# Patient Record
Sex: MALE | Race: WHITE | Employment: OTHER | URBAN - METROPOLITAN AREA
[De-identification: names, ages, dates, MRNs, and addresses within clinical notes are randomized per-mention and may not be internally consistent; named-entity substitution may affect disease eponyms.]

---

## 2017-02-06 ENCOUNTER — OFFICE VISIT (OUTPATIENT)
Dept: INTERNAL MEDICINE CLINIC | Age: 62
End: 2017-02-06

## 2017-02-06 VITALS
HEART RATE: 55 BPM | RESPIRATION RATE: 18 BRPM | DIASTOLIC BLOOD PRESSURE: 80 MMHG | HEIGHT: 72 IN | BODY MASS INDEX: 31.56 KG/M2 | SYSTOLIC BLOOD PRESSURE: 110 MMHG | WEIGHT: 233 LBS

## 2017-02-06 DIAGNOSIS — I10 ESSENTIAL HYPERTENSION: ICD-10-CM

## 2017-02-06 DIAGNOSIS — E11.9 DIET-CONTROLLED DIABETES MELLITUS (HCC): Primary | ICD-10-CM

## 2017-02-06 DIAGNOSIS — E78.49 OTHER HYPERLIPIDEMIA: ICD-10-CM

## 2017-02-06 DIAGNOSIS — K21.9 GASTROESOPHAGEAL REFLUX DISEASE WITHOUT ESOPHAGITIS: ICD-10-CM

## 2017-02-06 PROCEDURE — 99213 OFFICE O/P EST LOW 20 MIN: CPT | Performed by: INTERNAL MEDICINE

## 2017-02-06 ASSESSMENT — ENCOUNTER SYMPTOMS
ABDOMINAL PAIN: 0
TROUBLE SWALLOWING: 0
CHEST TIGHTNESS: 0

## 2017-02-13 RX ORDER — FENOFIBRATE 160 MG/1
160 TABLET ORAL DAILY
Qty: 90 TABLET | Refills: 0 | Status: SHIPPED | OUTPATIENT
Start: 2017-02-13 | End: 2017-05-12 | Stop reason: SDUPTHER

## 2017-03-07 RX ORDER — AMLODIPINE BESYLATE 5 MG/1
5 TABLET ORAL DAILY
Qty: 90 TABLET | Refills: 0 | Status: SHIPPED | OUTPATIENT
Start: 2017-03-07 | End: 2017-05-22 | Stop reason: SDUPTHER

## 2017-03-07 RX ORDER — LANSOPRAZOLE 30 MG/1
30 CAPSULE, DELAYED RELEASE ORAL DAILY
Qty: 90 CAPSULE | Refills: 0 | Status: SHIPPED | OUTPATIENT
Start: 2017-03-07 | End: 2017-05-22 | Stop reason: SDUPTHER

## 2017-03-09 RX ORDER — LOSARTAN POTASSIUM AND HYDROCHLOROTHIAZIDE 12.5; 1 MG/1; MG/1
1 TABLET ORAL DAILY
Qty: 90 TABLET | Refills: 0 | Status: SHIPPED | OUTPATIENT
Start: 2017-03-09 | End: 2017-05-22 | Stop reason: SDUPTHER

## 2017-05-15 RX ORDER — FENOFIBRATE 160 MG/1
160 TABLET ORAL DAILY
Qty: 90 TABLET | Refills: 0 | Status: SHIPPED | OUTPATIENT
Start: 2017-05-15 | End: 2017-05-22 | Stop reason: SDUPTHER

## 2017-05-22 ENCOUNTER — OFFICE VISIT (OUTPATIENT)
Dept: INTERNAL MEDICINE CLINIC | Age: 62
End: 2017-05-22

## 2017-05-22 VITALS
HEIGHT: 72 IN | WEIGHT: 231 LBS | SYSTOLIC BLOOD PRESSURE: 110 MMHG | HEART RATE: 70 BPM | DIASTOLIC BLOOD PRESSURE: 80 MMHG | RESPIRATION RATE: 18 BRPM | BODY MASS INDEX: 31.29 KG/M2

## 2017-05-22 DIAGNOSIS — I10 ESSENTIAL HYPERTENSION: ICD-10-CM

## 2017-05-22 DIAGNOSIS — F41.9 ANXIETY: Chronic | ICD-10-CM

## 2017-05-22 DIAGNOSIS — E11.9 DIET-CONTROLLED DIABETES MELLITUS (HCC): Primary | ICD-10-CM

## 2017-05-22 DIAGNOSIS — E78.49 OTHER HYPERLIPIDEMIA: ICD-10-CM

## 2017-05-22 DIAGNOSIS — K21.9 GASTROESOPHAGEAL REFLUX DISEASE WITHOUT ESOPHAGITIS: ICD-10-CM

## 2017-05-22 DIAGNOSIS — E11.9 DIET-CONTROLLED DIABETES MELLITUS (HCC): ICD-10-CM

## 2017-05-22 PROCEDURE — 99214 OFFICE O/P EST MOD 30 MIN: CPT | Performed by: INTERNAL MEDICINE

## 2017-05-22 RX ORDER — LANSOPRAZOLE 30 MG/1
30 CAPSULE, DELAYED RELEASE ORAL DAILY
Qty: 90 CAPSULE | Refills: 1 | Status: SHIPPED | OUTPATIENT
Start: 2017-05-22 | End: 2017-06-02 | Stop reason: SDUPTHER

## 2017-05-22 RX ORDER — METOPROLOL SUCCINATE 100 MG/1
100 TABLET, EXTENDED RELEASE ORAL DAILY
Qty: 90 TABLET | Refills: 1 | Status: SHIPPED | OUTPATIENT
Start: 2017-05-22 | End: 2017-11-02 | Stop reason: SDUPTHER

## 2017-05-22 RX ORDER — LOSARTAN POTASSIUM AND HYDROCHLOROTHIAZIDE 12.5; 1 MG/1; MG/1
1 TABLET ORAL DAILY
Qty: 90 TABLET | Refills: 1 | Status: SHIPPED | OUTPATIENT
Start: 2017-05-22 | End: 2017-11-02 | Stop reason: SDUPTHER

## 2017-05-22 RX ORDER — FENOFIBRATE 160 MG/1
160 TABLET ORAL DAILY
Qty: 90 TABLET | Refills: 1 | Status: SHIPPED | OUTPATIENT
Start: 2017-05-22 | End: 2017-11-20 | Stop reason: SDUPTHER

## 2017-05-22 RX ORDER — ALPRAZOLAM 0.25 MG/1
0.25 TABLET ORAL NIGHTLY PRN
Qty: 90 TABLET | Refills: 1 | Status: SHIPPED | OUTPATIENT
Start: 2017-05-22 | End: 2018-09-27 | Stop reason: SDUPTHER

## 2017-05-22 RX ORDER — AMLODIPINE BESYLATE 5 MG/1
5 TABLET ORAL DAILY
Qty: 90 TABLET | Refills: 1 | Status: SHIPPED | OUTPATIENT
Start: 2017-05-22 | End: 2017-06-02 | Stop reason: SDUPTHER

## 2017-05-22 ASSESSMENT — ENCOUNTER SYMPTOMS
CHEST TIGHTNESS: 0
TROUBLE SWALLOWING: 0
ABDOMINAL PAIN: 0

## 2017-05-23 LAB
A/G RATIO: 2.3 (ref 1.1–2.2)
ALBUMIN SERPL-MCNC: 4.5 G/DL (ref 3.4–5)
ALP BLD-CCNC: 49 U/L (ref 40–129)
ALT SERPL-CCNC: 30 U/L (ref 10–40)
ANION GAP SERPL CALCULATED.3IONS-SCNC: 16 MMOL/L (ref 3–16)
AST SERPL-CCNC: 20 U/L (ref 15–37)
BILIRUB SERPL-MCNC: 0.4 MG/DL (ref 0–1)
BUN BLDV-MCNC: 17 MG/DL (ref 7–20)
CALCIUM SERPL-MCNC: 9.2 MG/DL (ref 8.3–10.6)
CHLORIDE BLD-SCNC: 102 MMOL/L (ref 99–110)
CHOLESTEROL, TOTAL: 187 MG/DL (ref 0–199)
CO2: 23 MMOL/L (ref 21–32)
CREAT SERPL-MCNC: 0.8 MG/DL (ref 0.8–1.3)
ESTIMATED AVERAGE GLUCOSE: 119.8 MG/DL
GFR AFRICAN AMERICAN: >60
GFR NON-AFRICAN AMERICAN: >60
GLOBULIN: 2 G/DL
GLUCOSE BLD-MCNC: 110 MG/DL (ref 70–99)
HBA1C MFR BLD: 5.8 %
HDLC SERPL-MCNC: 31 MG/DL (ref 40–60)
LDL CHOLESTEROL CALCULATED: 111 MG/DL
POTASSIUM SERPL-SCNC: 4.3 MMOL/L (ref 3.5–5.1)
SODIUM BLD-SCNC: 141 MMOL/L (ref 136–145)
TOTAL PROTEIN: 6.5 G/DL (ref 6.4–8.2)
TRIGL SERPL-MCNC: 227 MG/DL (ref 0–150)
VLDLC SERPL CALC-MCNC: 45 MG/DL

## 2017-06-02 RX ORDER — AMLODIPINE BESYLATE 5 MG/1
5 TABLET ORAL DAILY
Qty: 90 TABLET | Refills: 1 | Status: SHIPPED | OUTPATIENT
Start: 2017-06-02 | End: 2017-11-02 | Stop reason: SDUPTHER

## 2017-06-02 RX ORDER — LANSOPRAZOLE 30 MG/1
30 CAPSULE, DELAYED RELEASE ORAL DAILY
Qty: 90 CAPSULE | Refills: 1 | Status: SHIPPED | OUTPATIENT
Start: 2017-06-02 | End: 2017-11-02 | Stop reason: SDUPTHER

## 2017-11-02 RX ORDER — AMLODIPINE BESYLATE 5 MG/1
5 TABLET ORAL DAILY
Qty: 90 TABLET | Refills: 1 | Status: SHIPPED | OUTPATIENT
Start: 2017-11-02 | End: 2017-11-20 | Stop reason: ALTCHOICE

## 2017-11-02 RX ORDER — METOPROLOL SUCCINATE 100 MG/1
100 TABLET, EXTENDED RELEASE ORAL DAILY
Qty: 90 TABLET | Refills: 1 | Status: SHIPPED | OUTPATIENT
Start: 2017-11-02 | End: 2018-07-22 | Stop reason: SDUPTHER

## 2017-11-02 RX ORDER — LOSARTAN POTASSIUM AND HYDROCHLOROTHIAZIDE 12.5; 1 MG/1; MG/1
1 TABLET ORAL DAILY
Qty: 90 TABLET | Refills: 1 | Status: SHIPPED | OUTPATIENT
Start: 2017-11-02 | End: 2018-04-27 | Stop reason: SDUPTHER

## 2017-11-02 RX ORDER — LANSOPRAZOLE 30 MG/1
CAPSULE, DELAYED RELEASE ORAL
Qty: 90 CAPSULE | Refills: 1 | Status: SHIPPED | OUTPATIENT
Start: 2017-11-02 | End: 2018-04-26 | Stop reason: SDUPTHER

## 2017-11-20 ENCOUNTER — OFFICE VISIT (OUTPATIENT)
Dept: INTERNAL MEDICINE CLINIC | Age: 62
End: 2017-11-20

## 2017-11-20 VITALS — BODY MASS INDEX: 32.37 KG/M2 | HEIGHT: 72 IN | WEIGHT: 239 LBS

## 2017-11-20 DIAGNOSIS — Z11.59 ENCOUNTER FOR HEPATITIS C SCREENING TEST FOR LOW RISK PATIENT: ICD-10-CM

## 2017-11-20 DIAGNOSIS — E11.9 DIET-CONTROLLED DIABETES MELLITUS (HCC): ICD-10-CM

## 2017-11-20 DIAGNOSIS — I10 ESSENTIAL HYPERTENSION: Primary | ICD-10-CM

## 2017-11-20 DIAGNOSIS — K21.9 GASTROESOPHAGEAL REFLUX DISEASE WITHOUT ESOPHAGITIS: ICD-10-CM

## 2017-11-20 DIAGNOSIS — E78.2 MIXED HYPERLIPIDEMIA: ICD-10-CM

## 2017-11-20 DIAGNOSIS — F41.9 ANXIETY: Chronic | ICD-10-CM

## 2017-11-20 LAB
A/G RATIO: 2.1 (ref 1.1–2.2)
ALBUMIN SERPL-MCNC: 4.7 G/DL (ref 3.4–5)
ALP BLD-CCNC: 59 U/L (ref 40–129)
ALT SERPL-CCNC: 36 U/L (ref 10–40)
ANION GAP SERPL CALCULATED.3IONS-SCNC: 17 MMOL/L (ref 3–16)
AST SERPL-CCNC: 23 U/L (ref 15–37)
BASOPHILS ABSOLUTE: 0.1 K/UL (ref 0–0.2)
BASOPHILS RELATIVE PERCENT: 0.8 %
BILIRUB SERPL-MCNC: 0.3 MG/DL (ref 0–1)
BILIRUBIN, POC: NORMAL
BLOOD URINE, POC: NORMAL
BUN BLDV-MCNC: 17 MG/DL (ref 7–20)
CALCIUM SERPL-MCNC: 9.7 MG/DL (ref 8.3–10.6)
CHLORIDE BLD-SCNC: 100 MMOL/L (ref 99–110)
CHOLESTEROL, TOTAL: 213 MG/DL (ref 0–199)
CLARITY, POC: NORMAL
CO2: 25 MMOL/L (ref 21–32)
COLOR, POC: NORMAL
CREAT SERPL-MCNC: 0.7 MG/DL (ref 0.8–1.3)
CREATININE URINE: 144.9 MG/DL (ref 39–259)
EOSINOPHILS ABSOLUTE: 0.3 K/UL (ref 0–0.6)
EOSINOPHILS RELATIVE PERCENT: 3.7 %
GFR AFRICAN AMERICAN: >60
GFR NON-AFRICAN AMERICAN: >60
GLOBULIN: 2.2 G/DL
GLUCOSE BLD-MCNC: 129 MG/DL (ref 70–99)
GLUCOSE URINE, POC: NORMAL
HCT VFR BLD CALC: 47.5 % (ref 40.5–52.5)
HDLC SERPL-MCNC: 32 MG/DL (ref 40–60)
HEMOGLOBIN: 16.2 G/DL (ref 13.5–17.5)
HEPATITIS C ANTIBODY INTERPRETATION: NORMAL
KETONES, POC: NORMAL
LDL CHOLESTEROL CALCULATED: 123 MG/DL
LEUKOCYTE EST, POC: NORMAL
LYMPHOCYTES ABSOLUTE: 2 K/UL (ref 1–5.1)
LYMPHOCYTES RELATIVE PERCENT: 26.4 %
MCH RBC QN AUTO: 30.9 PG (ref 26–34)
MCHC RBC AUTO-ENTMCNC: 34.2 G/DL (ref 31–36)
MCV RBC AUTO: 90.5 FL (ref 80–100)
MICROALBUMIN UR-MCNC: 1.5 MG/DL
MICROALBUMIN/CREAT UR-RTO: 10.4 MG/G (ref 0–30)
MONOCYTES ABSOLUTE: 0.7 K/UL (ref 0–1.3)
MONOCYTES RELATIVE PERCENT: 9.2 %
NEUTROPHILS ABSOLUTE: 4.5 K/UL (ref 1.7–7.7)
NEUTROPHILS RELATIVE PERCENT: 59.9 %
NITRITE, POC: NORMAL
PDW BLD-RTO: 12.7 % (ref 12.4–15.4)
PH, POC: NORMAL
PLATELET # BLD: 181 K/UL (ref 135–450)
PMV BLD AUTO: 9.7 FL (ref 5–10.5)
POTASSIUM SERPL-SCNC: 4.1 MMOL/L (ref 3.5–5.1)
PROTEIN, POC: NORMAL
RBC # BLD: 5.25 M/UL (ref 4.2–5.9)
SODIUM BLD-SCNC: 142 MMOL/L (ref 136–145)
SPECIFIC GRAVITY, POC: NORMAL
TOTAL PROTEIN: 6.9 G/DL (ref 6.4–8.2)
TRIGL SERPL-MCNC: 288 MG/DL (ref 0–150)
UROBILINOGEN, POC: NORMAL
VLDLC SERPL CALC-MCNC: 58 MG/DL
WBC # BLD: 7.6 K/UL (ref 4–11)

## 2017-11-20 PROCEDURE — G8417 CALC BMI ABV UP PARAM F/U: HCPCS | Performed by: INTERNAL MEDICINE

## 2017-11-20 PROCEDURE — G8482 FLU IMMUNIZE ORDER/ADMIN: HCPCS | Performed by: INTERNAL MEDICINE

## 2017-11-20 PROCEDURE — 3017F COLORECTAL CA SCREEN DOC REV: CPT | Performed by: INTERNAL MEDICINE

## 2017-11-20 PROCEDURE — 1036F TOBACCO NON-USER: CPT | Performed by: INTERNAL MEDICINE

## 2017-11-20 PROCEDURE — G8427 DOCREV CUR MEDS BY ELIG CLIN: HCPCS | Performed by: INTERNAL MEDICINE

## 2017-11-20 PROCEDURE — 99214 OFFICE O/P EST MOD 30 MIN: CPT | Performed by: INTERNAL MEDICINE

## 2017-11-20 PROCEDURE — 3044F HG A1C LEVEL LT 7.0%: CPT | Performed by: INTERNAL MEDICINE

## 2017-11-20 PROCEDURE — 81002 URINALYSIS NONAUTO W/O SCOPE: CPT | Performed by: INTERNAL MEDICINE

## 2017-11-20 RX ORDER — FENOFIBRATE 160 MG/1
160 TABLET ORAL DAILY
Qty: 90 TABLET | Refills: 1 | Status: SHIPPED | OUTPATIENT
Start: 2017-11-20 | End: 2018-12-04 | Stop reason: SDUPTHER

## 2017-11-20 RX ORDER — MELOXICAM 15 MG/1
15 TABLET ORAL DAILY
Qty: 30 TABLET | Refills: 0 | Status: SHIPPED | OUTPATIENT
Start: 2017-11-20 | End: 2017-12-20 | Stop reason: SDUPTHER

## 2017-11-20 ASSESSMENT — ENCOUNTER SYMPTOMS
ABDOMINAL PAIN: 0
TROUBLE SWALLOWING: 0
CHEST TIGHTNESS: 0

## 2017-11-20 ASSESSMENT — PATIENT HEALTH QUESTIONNAIRE - PHQ9
SUM OF ALL RESPONSES TO PHQ9 QUESTIONS 1 & 2: 0
1. LITTLE INTEREST OR PLEASURE IN DOING THINGS: 0
SUM OF ALL RESPONSES TO PHQ QUESTIONS 1-9: 0
2. FEELING DOWN, DEPRESSED OR HOPELESS: 0

## 2017-11-20 NOTE — PROGRESS NOTES
mouth daily. No current facility-administered medications for this visit. Review of Systems   Constitutional: Negative for fatigue. HENT: Negative for ear discharge, hearing loss and trouble swallowing. Respiratory: Negative for chest tightness. Cardiovascular: Negative for chest pain and palpitations. Gastrointestinal: Negative for abdominal pain. Endocrine: Negative for polyuria. Genitourinary: Negative for frequency, hematuria and testicular pain. Musculoskeletal: Negative for myalgias. Neurological: Negative for light-headedness. Psychiatric/Behavioral: Negative for decreased concentration and sleep disturbance. The patient is nervous/anxious. There are no changes to past medical history, family history, social history or review of systems(except as noted in the history section) since prior note (all reviewed with patient). Objective:   Physical Exam  There were no vitals filed for this visit. General:  Awake, alert and oriented. Appears to be not in any distress  Mucous Membranes:  Pink , anicteric  Neck: No JVD, no carotid bruit, no thyromegaly  Chest:  Clear to auscultation bilaterally, no added sounds  Cardiovascular:  RRR S1S2 heard, no murmurs or gallops  Abdomen:  Soft, undistended, non tender, no organomegaly, BS present  Extremities: No edema or cyanosis. Distal pulses well felt  Neurological : grossly normal            Assessment:      1. Essential hypertension     2. Mixed hyperlipidemia     3. Gastroesophageal reflux disease without esophagitis     4. Diet-controlled diabetes mellitus ([address]     5. Anxiety             Plan:         DM - 2 - did not tolerate metformin  Now doing diet and lost about weight with good improved sugars.    Improved A1c from 6.7 to 5.8   Continue diet and exercise  Keep off metformin      HTN- stable on current meds- hyzaar,, metoprolol  Stop norvasc     Hyperlipidemia - on fish oil, resume Fenofibrate  Continue same

## 2017-11-21 LAB
ESTIMATED AVERAGE GLUCOSE: 131.2 MG/DL
HBA1C MFR BLD: 6.2 %

## 2017-12-20 RX ORDER — MELOXICAM 15 MG/1
15 TABLET ORAL DAILY
Qty: 30 TABLET | Refills: 0 | Status: SHIPPED | OUTPATIENT
Start: 2017-12-20 | End: 2018-01-31 | Stop reason: SDUPTHER

## 2018-02-01 RX ORDER — MELOXICAM 15 MG/1
15 TABLET ORAL DAILY
Qty: 30 TABLET | Refills: 5 | Status: SHIPPED | OUTPATIENT
Start: 2018-02-01 | End: 2019-06-10 | Stop reason: ALTCHOICE

## 2018-02-19 ENCOUNTER — TELEPHONE (OUTPATIENT)
Dept: INTERNAL MEDICINE CLINIC | Age: 63
End: 2018-02-19

## 2018-02-20 ENCOUNTER — OFFICE VISIT (OUTPATIENT)
Dept: ORTHOPEDIC SURGERY | Age: 63
End: 2018-02-20

## 2018-02-20 VITALS
HEART RATE: 72 BPM | BODY MASS INDEX: 32.1 KG/M2 | SYSTOLIC BLOOD PRESSURE: 112 MMHG | DIASTOLIC BLOOD PRESSURE: 78 MMHG | WEIGHT: 237 LBS | HEIGHT: 72 IN

## 2018-02-20 DIAGNOSIS — M25.511 RIGHT SHOULDER PAIN, UNSPECIFIED CHRONICITY: Primary | ICD-10-CM

## 2018-02-20 DIAGNOSIS — M75.41 IMPINGEMENT SYNDROME OF RIGHT SHOULDER: ICD-10-CM

## 2018-02-20 DIAGNOSIS — R22.31 MASS OF ARM, RIGHT: ICD-10-CM

## 2018-02-20 PROCEDURE — 99203 OFFICE O/P NEW LOW 30 MIN: CPT | Performed by: PHYSICIAN ASSISTANT

## 2018-02-20 PROCEDURE — 3017F COLORECTAL CA SCREEN DOC REV: CPT | Performed by: PHYSICIAN ASSISTANT

## 2018-02-20 PROCEDURE — G8427 DOCREV CUR MEDS BY ELIG CLIN: HCPCS | Performed by: PHYSICIAN ASSISTANT

## 2018-02-20 PROCEDURE — 1036F TOBACCO NON-USER: CPT | Performed by: PHYSICIAN ASSISTANT

## 2018-02-20 PROCEDURE — G8417 CALC BMI ABV UP PARAM F/U: HCPCS | Performed by: PHYSICIAN ASSISTANT

## 2018-02-20 PROCEDURE — G8482 FLU IMMUNIZE ORDER/ADMIN: HCPCS | Performed by: PHYSICIAN ASSISTANT

## 2018-02-20 NOTE — PROGRESS NOTES
bilaterally reveals all areas to be without enlargement or induration. Vascular: Examination reveals no swelling or calf tenderness. Peripheral pulses are palpable and 2+. Neurological: The patient has good coordination. There is no weakness or sensory deficit. Right Shoulder Examination:    Inspection:  No rashes, scars, or lesions. No deformity or atrophy. Palpation:  Palpable masses present proximal posterior humerus. Nonpainful to palpation. No significant tenderness over the acromioclavicular joint    Range of Motion:  180° of forward elevation, external rotation 45°, internal rotation L4    Strength:  3/5 strength with elevation and abduction, 4/5 with internal and external rotation. Biceps and triceps strength is 5/5. Special Tests:  Positive Mahmood and Neer impingement exam.  Negative speed sign. Negative crossover examination. Skin: There are no rashes, ulcerations or lesions. Gait: Normal gait pattern    Reflex normal deep tendon reflexes    Additional Comments:       Additional Examinations:         Contralateral Exam: Examination of the left shoulder reveals no atrophy or deformity. Skin is warm and dry. Range of motion is within normal limits. There is no focal tenderness with palpation. No AC joint tenderness. Negative Neer and Mahmood-Macho exams. Strength is graded 5/5 throughout. Neck: Examination of the neck does not show any tenderness, deformity or injury. Range of motion is unremarkable. There is no gross instability. There are no rashes, ulcerations or lesions. Strength and tone are normal.    Radiology:     X-rays obtained and reviewed in office:  Views 3 views including AP, Y, axillary  Location right shoulder  Impression there is a well-maintained glenohumeral joint with minimal arthritic changes. No fractures or dislocations. Impression:  Encounter Diagnoses   Name Primary?     Right shoulder pain, unspecified chronicity Yes    Impingement

## 2018-03-01 ENCOUNTER — OFFICE VISIT (OUTPATIENT)
Dept: ORTHOPEDIC SURGERY | Age: 63
End: 2018-03-01

## 2018-03-01 DIAGNOSIS — M75.111 INCOMPLETE TEAR OF RIGHT ROTATOR CUFF: Primary | ICD-10-CM

## 2018-03-01 DIAGNOSIS — M75.41 IMPINGEMENT SYNDROME OF RIGHT SHOULDER: ICD-10-CM

## 2018-03-01 DIAGNOSIS — M75.01 ADHESIVE CAPSULITIS OF RIGHT SHOULDER: ICD-10-CM

## 2018-03-01 DIAGNOSIS — M19.011 OSTEOARTHRITIS OF RIGHT ACROMIOCLAVICULAR JOINT: ICD-10-CM

## 2018-03-01 PROCEDURE — G8482 FLU IMMUNIZE ORDER/ADMIN: HCPCS | Performed by: ORTHOPAEDIC SURGERY

## 2018-03-01 PROCEDURE — G8417 CALC BMI ABV UP PARAM F/U: HCPCS | Performed by: ORTHOPAEDIC SURGERY

## 2018-03-01 PROCEDURE — 20611 DRAIN/INJ JOINT/BURSA W/US: CPT | Performed by: ORTHOPAEDIC SURGERY

## 2018-03-01 PROCEDURE — 1036F TOBACCO NON-USER: CPT | Performed by: ORTHOPAEDIC SURGERY

## 2018-03-01 PROCEDURE — G8427 DOCREV CUR MEDS BY ELIG CLIN: HCPCS | Performed by: ORTHOPAEDIC SURGERY

## 2018-03-01 PROCEDURE — 99213 OFFICE O/P EST LOW 20 MIN: CPT | Performed by: ORTHOPAEDIC SURGERY

## 2018-03-01 PROCEDURE — 3017F COLORECTAL CA SCREEN DOC REV: CPT | Performed by: ORTHOPAEDIC SURGERY

## 2018-03-01 NOTE — PROGRESS NOTES
reflexes are intact  Mental Status: The patient is oriented to time, place and person. The patient's mood and affect are appropriate. Lymphatic: The lymphatic examination bilaterally reveals all areas to be without enlargement or induration. Vascular: Examination reveals no swelling or calf tenderness. Peripheral pulses are palpable and 2+. Neurological: The patient has good coordination. There is no weakness or sensory deficit. Right Shoulder Examination:    Inspection:  No rashes, scars, or lesions. No deformity or atrophy. Palpation:  Palpable masses present proximal posterior humerus. Nonpainful to palpation. No significant tenderness over the acromioclavicular joint    Range of Motion:  180° of forward elevation, external rotation 20°, internal rotation beltline    Strength:  3+/5 strength with elevation and abduction, 4/5 with internal and external rotation. Biceps and triceps strength is 5/5. Special Tests:  Positive Mahmood and Neer impingement exam.  Negative speed sign. Negative crossover examination. Skin: There are no rashes, ulcerations or lesions. Gait: Normal gait pattern    Reflex normal deep tendon reflexes    Additional Comments:       Additional Examinations:         Contralateral Exam: Examination of the left shoulder reveals no atrophy or deformity. Skin is warm and dry. Range of motion is within normal limits. There is no focal tenderness with palpation. No AC joint tenderness. Negative Neer and Mahmood-Macho exams. Strength is graded 5/5 throughout. Neck: Examination of the neck does not show any tenderness, deformity or injury. Range of motion is unremarkable. There is no gross instability. There are no rashes, ulcerations or lesions.   Strength and tone are normal.    Radiology:     X-rays obtained previously and reviewed in office:  Views 3 views including AP, Y, axillary  Location right shoulder  Impression there is a well-maintained glenohumeral joint shahzad. Treatment Plan:      Again patient on his own accord has decided not to do the MRI of his proximal humerus. I have told them that if his masses increase in size or decides to get the MRI we are happy to order it for him at any time. We have recommended a cortisone injection into his subacromial space. Physical therapy concentrated on rotator cuff strengthening exercises and range of motion for adhesive capsulitis.   Follow-up in the office in 4-6 weeks if not doing well

## 2018-03-07 ENCOUNTER — HOSPITAL ENCOUNTER (OUTPATIENT)
Dept: PHYSICAL THERAPY | Age: 63
Discharge: HOME | End: 2018-03-31
Admitting: ORTHOPAEDIC SURGERY

## 2018-04-01 ENCOUNTER — HOSPITAL ENCOUNTER (OUTPATIENT)
Dept: PHYSICAL THERAPY | Age: 63
Discharge: HOME | End: 2018-04-30
Attending: ORTHOPAEDIC SURGERY | Admitting: ORTHOPAEDIC SURGERY

## 2018-04-27 RX ORDER — LOSARTAN POTASSIUM AND HYDROCHLOROTHIAZIDE 12.5; 1 MG/1; MG/1
1 TABLET ORAL DAILY
Qty: 90 TABLET | Refills: 0 | Status: SHIPPED | OUTPATIENT
Start: 2018-04-27 | End: 2018-07-29 | Stop reason: SDUPTHER

## 2018-06-06 ENCOUNTER — OFFICE VISIT (OUTPATIENT)
Dept: INTERNAL MEDICINE CLINIC | Age: 63
End: 2018-06-06

## 2018-06-06 VITALS
DIASTOLIC BLOOD PRESSURE: 75 MMHG | HEIGHT: 72 IN | SYSTOLIC BLOOD PRESSURE: 125 MMHG | RESPIRATION RATE: 18 BRPM | HEART RATE: 70 BPM | WEIGHT: 235 LBS | BODY MASS INDEX: 31.83 KG/M2

## 2018-06-06 DIAGNOSIS — E78.2 MIXED HYPERLIPIDEMIA: ICD-10-CM

## 2018-06-06 DIAGNOSIS — I10 ESSENTIAL HYPERTENSION: Primary | ICD-10-CM

## 2018-06-06 DIAGNOSIS — R35.89 POLYURIA: ICD-10-CM

## 2018-06-06 DIAGNOSIS — E11.9 DIET-CONTROLLED DIABETES MELLITUS (HCC): ICD-10-CM

## 2018-06-06 DIAGNOSIS — K21.9 GASTROESOPHAGEAL REFLUX DISEASE WITHOUT ESOPHAGITIS: ICD-10-CM

## 2018-06-06 DIAGNOSIS — F41.9 ANXIETY: Chronic | ICD-10-CM

## 2018-06-06 LAB
A/G RATIO: 2.3 (ref 1.1–2.2)
ALBUMIN SERPL-MCNC: 4.8 G/DL (ref 3.4–5)
ALP BLD-CCNC: 54 U/L (ref 40–129)
ALT SERPL-CCNC: 31 U/L (ref 10–40)
ANION GAP SERPL CALCULATED.3IONS-SCNC: 17 MMOL/L (ref 3–16)
AST SERPL-CCNC: 22 U/L (ref 15–37)
BILIRUB SERPL-MCNC: 0.5 MG/DL (ref 0–1)
BUN BLDV-MCNC: 16 MG/DL (ref 7–20)
CALCIUM SERPL-MCNC: 9.4 MG/DL (ref 8.3–10.6)
CHLORIDE BLD-SCNC: 99 MMOL/L (ref 99–110)
CO2: 24 MMOL/L (ref 21–32)
CREAT SERPL-MCNC: 0.7 MG/DL (ref 0.8–1.3)
GFR AFRICAN AMERICAN: >60
GFR NON-AFRICAN AMERICAN: >60
GLOBULIN: 2.1 G/DL
GLUCOSE BLD-MCNC: 112 MG/DL (ref 70–99)
POTASSIUM SERPL-SCNC: 4.2 MMOL/L (ref 3.5–5.1)
PROSTATE SPECIFIC ANTIGEN: 1.94 NG/ML (ref 0–4)
SODIUM BLD-SCNC: 140 MMOL/L (ref 136–145)
TOTAL PROTEIN: 6.9 G/DL (ref 6.4–8.2)

## 2018-06-06 PROCEDURE — 2022F DILAT RTA XM EVC RTNOPTHY: CPT | Performed by: INTERNAL MEDICINE

## 2018-06-06 PROCEDURE — G8417 CALC BMI ABV UP PARAM F/U: HCPCS | Performed by: INTERNAL MEDICINE

## 2018-06-06 PROCEDURE — 3046F HEMOGLOBIN A1C LEVEL >9.0%: CPT | Performed by: INTERNAL MEDICINE

## 2018-06-06 PROCEDURE — G8428 CUR MEDS NOT DOCUMENT: HCPCS | Performed by: INTERNAL MEDICINE

## 2018-06-06 PROCEDURE — 3017F COLORECTAL CA SCREEN DOC REV: CPT | Performed by: INTERNAL MEDICINE

## 2018-06-06 PROCEDURE — 99213 OFFICE O/P EST LOW 20 MIN: CPT | Performed by: INTERNAL MEDICINE

## 2018-06-06 PROCEDURE — 1036F TOBACCO NON-USER: CPT | Performed by: INTERNAL MEDICINE

## 2018-06-06 ASSESSMENT — ENCOUNTER SYMPTOMS
TROUBLE SWALLOWING: 0
CHEST TIGHTNESS: 0
ABDOMINAL PAIN: 0

## 2018-06-06 ASSESSMENT — PATIENT HEALTH QUESTIONNAIRE - PHQ9
SUM OF ALL RESPONSES TO PHQ QUESTIONS 1-9: 0
2. FEELING DOWN, DEPRESSED OR HOPELESS: 0
SUM OF ALL RESPONSES TO PHQ9 QUESTIONS 1 & 2: 0
1. LITTLE INTEREST OR PLEASURE IN DOING THINGS: 0

## 2018-06-07 LAB
ESTIMATED AVERAGE GLUCOSE: 131.2 MG/DL
HBA1C MFR BLD: 6.2 %

## 2018-07-23 RX ORDER — METOPROLOL SUCCINATE 100 MG/1
100 TABLET, EXTENDED RELEASE ORAL DAILY
Qty: 90 TABLET | Refills: 1 | Status: SHIPPED | OUTPATIENT
Start: 2018-07-23 | End: 2018-09-27 | Stop reason: SDUPTHER

## 2018-07-30 RX ORDER — LOSARTAN POTASSIUM AND HYDROCHLOROTHIAZIDE 12.5; 1 MG/1; MG/1
1 TABLET ORAL DAILY
Qty: 90 TABLET | Refills: 1 | Status: SHIPPED | OUTPATIENT
Start: 2018-07-30 | End: 2018-09-27 | Stop reason: SDUPTHER

## 2018-08-09 ENCOUNTER — TELEPHONE (OUTPATIENT)
Dept: INTERNAL MEDICINE CLINIC | Age: 63
End: 2018-08-09

## 2018-08-09 NOTE — TELEPHONE ENCOUNTER
----- Message from Ave Thakkar MD sent at 8/9/2018 12:15 PM EDT -----  Contact: pt glen Dickinson [phone]  Sure    ----- Message -----  From: Maged Clarke  Sent: 8/9/2018  11:35 AM  To: Ave Thakkar MD    Pt would like to switch to an OTC or generic lansoprazole (PREVACID) 30 MG delayed release capsule due to the cost.     [facility]

## 2018-09-27 DIAGNOSIS — F41.9 ANXIETY: Primary | Chronic | ICD-10-CM

## 2018-09-27 RX ORDER — METOPROLOL SUCCINATE 100 MG/1
100 TABLET, EXTENDED RELEASE ORAL DAILY
Qty: 90 TABLET | Refills: 0 | Status: SHIPPED | OUTPATIENT
Start: 2018-09-27 | End: 2018-12-04 | Stop reason: SDUPTHER

## 2018-09-27 RX ORDER — ALPRAZOLAM 0.25 MG/1
0.25 TABLET ORAL NIGHTLY PRN
Qty: 90 TABLET | Refills: 0 | Status: SHIPPED | OUTPATIENT
Start: 2018-09-27 | End: 2018-12-04 | Stop reason: SDUPTHER

## 2018-09-27 RX ORDER — LOSARTAN POTASSIUM AND HYDROCHLOROTHIAZIDE 12.5; 1 MG/1; MG/1
1 TABLET ORAL DAILY
Qty: 90 TABLET | Refills: 0 | Status: SHIPPED | OUTPATIENT
Start: 2018-09-27 | End: 2018-12-04 | Stop reason: SDUPTHER

## 2018-09-27 NOTE — TELEPHONE ENCOUNTER
----- Message from Mariusz Morelos sent at 9/27/2018 10:30 AM EDT -----  Contact: pt spouse kenzie  Pt needs his pharmacy switched to [facility] and needs refills for losartan-hydrochlorothiazide (HYZAAR) 100-12.5 MG per tablet  metoprolol succinate (TOPROL XL) 100 MG extended release tablet  ALPRAZolam (XANAX) 0.25 MG tablet     Vlad Arroyo  -am

## 2018-12-04 ENCOUNTER — OFFICE VISIT (OUTPATIENT)
Dept: INTERNAL MEDICINE CLINIC | Age: 63
End: 2018-12-04

## 2018-12-04 VITALS
HEART RATE: 70 BPM | BODY MASS INDEX: 32.91 KG/M2 | HEIGHT: 72 IN | SYSTOLIC BLOOD PRESSURE: 110 MMHG | DIASTOLIC BLOOD PRESSURE: 75 MMHG | RESPIRATION RATE: 18 BRPM | WEIGHT: 243 LBS

## 2018-12-04 DIAGNOSIS — E11.9 DIET-CONTROLLED DIABETES MELLITUS (HCC): ICD-10-CM

## 2018-12-04 DIAGNOSIS — Z00.00 ENCOUNTER FOR MEDICARE ANNUAL WELLNESS EXAM: ICD-10-CM

## 2018-12-04 DIAGNOSIS — F41.9 ANXIETY: Chronic | ICD-10-CM

## 2018-12-04 DIAGNOSIS — K21.9 GASTROESOPHAGEAL REFLUX DISEASE WITHOUT ESOPHAGITIS: ICD-10-CM

## 2018-12-04 DIAGNOSIS — Z12.11 COLON CANCER SCREENING: ICD-10-CM

## 2018-12-04 DIAGNOSIS — I10 ESSENTIAL HYPERTENSION: ICD-10-CM

## 2018-12-04 DIAGNOSIS — Z00.00 ENCOUNTER FOR MEDICARE ANNUAL WELLNESS EXAM: Primary | ICD-10-CM

## 2018-12-04 DIAGNOSIS — E78.2 MIXED HYPERLIPIDEMIA: ICD-10-CM

## 2018-12-04 LAB
A/G RATIO: 2.1 (ref 1.1–2.2)
ALBUMIN SERPL-MCNC: 4.8 G/DL (ref 3.4–5)
ALP BLD-CCNC: 58 U/L (ref 40–129)
ALT SERPL-CCNC: 36 U/L (ref 10–40)
ANION GAP SERPL CALCULATED.3IONS-SCNC: 15 MMOL/L (ref 3–16)
AST SERPL-CCNC: 23 U/L (ref 15–37)
BASOPHILS ABSOLUTE: 0 K/UL (ref 0–0.2)
BASOPHILS RELATIVE PERCENT: 0.7 %
BILIRUB SERPL-MCNC: 0.4 MG/DL (ref 0–1)
BILIRUBIN, POC: NORMAL
BLOOD URINE, POC: NORMAL
BUN BLDV-MCNC: 13 MG/DL (ref 7–20)
CALCIUM SERPL-MCNC: 9.8 MG/DL (ref 8.3–10.6)
CHLORIDE BLD-SCNC: 102 MMOL/L (ref 99–110)
CHOLESTEROL, TOTAL: 222 MG/DL (ref 0–199)
CLARITY, POC: NORMAL
CO2: 24 MMOL/L (ref 21–32)
COLOR, POC: NORMAL
CREAT SERPL-MCNC: 0.7 MG/DL (ref 0.8–1.3)
CREATININE URINE: 167.4 MG/DL (ref 39–259)
EOSINOPHILS ABSOLUTE: 0.2 K/UL (ref 0–0.6)
EOSINOPHILS RELATIVE PERCENT: 2.3 %
GFR AFRICAN AMERICAN: >60
GFR NON-AFRICAN AMERICAN: >60
GLOBULIN: 2.3 G/DL
GLUCOSE BLD-MCNC: 134 MG/DL (ref 70–99)
GLUCOSE URINE, POC: NORMAL
HCT VFR BLD CALC: 47.9 % (ref 40.5–52.5)
HDLC SERPL-MCNC: 33 MG/DL (ref 40–60)
HEMOGLOBIN: 16.3 G/DL (ref 13.5–17.5)
KETONES, POC: NORMAL
LDL CHOLESTEROL CALCULATED: ABNORMAL MG/DL
LDL CHOLESTEROL DIRECT: 160 MG/DL
LEUKOCYTE EST, POC: NORMAL
LYMPHOCYTES ABSOLUTE: 2 K/UL (ref 1–5.1)
LYMPHOCYTES RELATIVE PERCENT: 25.6 %
MCH RBC QN AUTO: 30.9 PG (ref 26–34)
MCHC RBC AUTO-ENTMCNC: 34 G/DL (ref 31–36)
MCV RBC AUTO: 90.6 FL (ref 80–100)
MICROALBUMIN UR-MCNC: 2.2 MG/DL
MICROALBUMIN/CREAT UR-RTO: 13.1 MG/G (ref 0–30)
MONOCYTES ABSOLUTE: 0.7 K/UL (ref 0–1.3)
MONOCYTES RELATIVE PERCENT: 9.2 %
NEUTROPHILS ABSOLUTE: 4.8 K/UL (ref 1.7–7.7)
NEUTROPHILS RELATIVE PERCENT: 62.2 %
NITRITE, POC: NORMAL
PDW BLD-RTO: 12.7 % (ref 12.4–15.4)
PH, POC: NORMAL
PLATELET # BLD: 183 K/UL (ref 135–450)
PMV BLD AUTO: 9.4 FL (ref 5–10.5)
POTASSIUM SERPL-SCNC: 4.4 MMOL/L (ref 3.5–5.1)
PROSTATE SPECIFIC ANTIGEN: 2 NG/ML (ref 0–4)
PROTEIN, POC: NORMAL
RBC # BLD: 5.29 M/UL (ref 4.2–5.9)
SODIUM BLD-SCNC: 141 MMOL/L (ref 136–145)
SPECIFIC GRAVITY, POC: NORMAL
TOTAL PROTEIN: 7.1 G/DL (ref 6.4–8.2)
TRIGL SERPL-MCNC: 331 MG/DL (ref 0–150)
TSH REFLEX: 2.59 UIU/ML (ref 0.27–4.2)
URIC ACID, SERUM: 7.6 MG/DL (ref 3.5–7.2)
UROBILINOGEN, POC: NORMAL
VLDLC SERPL CALC-MCNC: ABNORMAL MG/DL
WBC # BLD: 7.7 K/UL (ref 4–11)

## 2018-12-04 PROCEDURE — 81002 URINALYSIS NONAUTO W/O SCOPE: CPT | Performed by: INTERNAL MEDICINE

## 2018-12-04 PROCEDURE — 90732 PPSV23 VACC 2 YRS+ SUBQ/IM: CPT | Performed by: INTERNAL MEDICINE

## 2018-12-04 PROCEDURE — G0402 INITIAL PREVENTIVE EXAM: HCPCS | Performed by: INTERNAL MEDICINE

## 2018-12-04 PROCEDURE — 3044F HG A1C LEVEL LT 7.0%: CPT | Performed by: INTERNAL MEDICINE

## 2018-12-04 PROCEDURE — G0009 ADMIN PNEUMOCOCCAL VACCINE: HCPCS | Performed by: INTERNAL MEDICINE

## 2018-12-04 PROCEDURE — 4040F PNEUMOC VAC/ADMIN/RCVD: CPT | Performed by: INTERNAL MEDICINE

## 2018-12-04 RX ORDER — FENOFIBRATE 160 MG/1
160 TABLET ORAL DAILY
Qty: 90 TABLET | Refills: 1 | Status: SHIPPED | OUTPATIENT
Start: 2018-12-04 | End: 2019-07-10

## 2018-12-04 RX ORDER — ALPRAZOLAM 0.25 MG/1
0.25 TABLET ORAL NIGHTLY PRN
Qty: 90 TABLET | Refills: 1 | Status: SHIPPED | OUTPATIENT
Start: 2018-12-04 | End: 2019-03-04

## 2018-12-04 RX ORDER — LOSARTAN POTASSIUM AND HYDROCHLOROTHIAZIDE 12.5; 1 MG/1; MG/1
1 TABLET ORAL DAILY
Qty: 90 TABLET | Refills: 1 | Status: SHIPPED | OUTPATIENT
Start: 2018-12-04 | End: 2019-06-10 | Stop reason: SDUPTHER

## 2018-12-04 RX ORDER — METOPROLOL SUCCINATE 100 MG/1
100 TABLET, EXTENDED RELEASE ORAL DAILY
Qty: 90 TABLET | Refills: 1 | Status: SHIPPED | OUTPATIENT
Start: 2018-12-04 | End: 2019-06-10 | Stop reason: SDUPTHER

## 2018-12-04 RX ORDER — LANSOPRAZOLE 30 MG/1
30 CAPSULE, DELAYED RELEASE ORAL DAILY
Qty: 90 CAPSULE | Refills: 1 | Status: SHIPPED | OUTPATIENT
Start: 2018-12-04 | End: 2019-06-10 | Stop reason: SDUPTHER

## 2018-12-04 ASSESSMENT — ENCOUNTER SYMPTOMS
TROUBLE SWALLOWING: 0
CHEST TIGHTNESS: 0
ABDOMINAL PAIN: 0

## 2018-12-04 ASSESSMENT — PATIENT HEALTH QUESTIONNAIRE - PHQ9
SUM OF ALL RESPONSES TO PHQ QUESTIONS 1-9: 0
SUM OF ALL RESPONSES TO PHQ QUESTIONS 1-9: 0

## 2018-12-04 ASSESSMENT — ANXIETY QUESTIONNAIRES: GAD7 TOTAL SCORE: 1

## 2018-12-05 ENCOUNTER — TELEPHONE (OUTPATIENT)
Dept: INTERNAL MEDICINE CLINIC | Age: 63
End: 2018-12-05

## 2018-12-05 LAB
ESTIMATED AVERAGE GLUCOSE: 145.6 MG/DL
HBA1C MFR BLD: 6.7 %

## 2019-03-01 ENCOUNTER — TELEPHONE (OUTPATIENT)
Dept: INTERNAL MEDICINE CLINIC | Age: 64
End: 2019-03-01

## 2019-03-01 RX ORDER — AZITHROMYCIN 250 MG/1
TABLET, FILM COATED ORAL
Qty: 1 PACKET | Refills: 0 | Status: SHIPPED | OUTPATIENT
Start: 2019-03-01 | End: 2019-06-10 | Stop reason: ALTCHOICE

## 2019-03-05 ENCOUNTER — TELEPHONE (OUTPATIENT)
Dept: INTERNAL MEDICINE CLINIC | Age: 64
End: 2019-03-05

## 2019-03-05 RX ORDER — CEFUROXIME AXETIL 250 MG/1
250 TABLET ORAL 2 TIMES DAILY
Qty: 10 TABLET | Refills: 0 | Status: SHIPPED | OUTPATIENT
Start: 2019-03-05 | End: 2019-03-05 | Stop reason: CLARIF

## 2019-03-05 RX ORDER — CEFUROXIME AXETIL 250 MG/1
250 TABLET ORAL 2 TIMES DAILY
Qty: 10 TABLET | Refills: 0 | Status: SHIPPED | OUTPATIENT
Start: 2019-03-05 | End: 2019-03-10

## 2019-06-10 ENCOUNTER — OFFICE VISIT (OUTPATIENT)
Dept: INTERNAL MEDICINE CLINIC | Age: 64
End: 2019-06-10

## 2019-06-10 VITALS
HEIGHT: 72 IN | BODY MASS INDEX: 31.97 KG/M2 | WEIGHT: 236 LBS | HEART RATE: 70 BPM | RESPIRATION RATE: 18 BRPM | SYSTOLIC BLOOD PRESSURE: 125 MMHG | DIASTOLIC BLOOD PRESSURE: 68 MMHG

## 2019-06-10 DIAGNOSIS — K21.9 GASTROESOPHAGEAL REFLUX DISEASE WITHOUT ESOPHAGITIS: ICD-10-CM

## 2019-06-10 DIAGNOSIS — I10 ESSENTIAL HYPERTENSION: ICD-10-CM

## 2019-06-10 DIAGNOSIS — E11.9 DIET-CONTROLLED DIABETES MELLITUS (HCC): Primary | ICD-10-CM

## 2019-06-10 DIAGNOSIS — F41.9 ANXIETY: Chronic | ICD-10-CM

## 2019-06-10 DIAGNOSIS — E78.2 MIXED HYPERLIPIDEMIA: ICD-10-CM

## 2019-06-10 DIAGNOSIS — E11.9 DIET-CONTROLLED DIABETES MELLITUS (HCC): ICD-10-CM

## 2019-06-10 DIAGNOSIS — Z12.11 COLON CANCER SCREENING: Primary | ICD-10-CM

## 2019-06-10 LAB
A/G RATIO: 2.4 (ref 1.1–2.2)
ALBUMIN SERPL-MCNC: 5.1 G/DL (ref 3.4–5)
ALP BLD-CCNC: 56 U/L (ref 40–129)
ALT SERPL-CCNC: 29 U/L (ref 10–40)
ANION GAP SERPL CALCULATED.3IONS-SCNC: 18 MMOL/L (ref 3–16)
AST SERPL-CCNC: 20 U/L (ref 15–37)
BILIRUB SERPL-MCNC: 0.4 MG/DL (ref 0–1)
BUN BLDV-MCNC: 17 MG/DL (ref 7–20)
CALCIUM SERPL-MCNC: 9.7 MG/DL (ref 8.3–10.6)
CHLORIDE BLD-SCNC: 100 MMOL/L (ref 99–110)
CHOLESTEROL, FASTING: 194 MG/DL (ref 0–199)
CO2: 23 MMOL/L (ref 21–32)
CREAT SERPL-MCNC: 0.8 MG/DL (ref 0.8–1.3)
GFR AFRICAN AMERICAN: >60
GFR NON-AFRICAN AMERICAN: >60
GLOBULIN: 2.1 G/DL
GLUCOSE BLD-MCNC: 116 MG/DL (ref 70–99)
HDLC SERPL-MCNC: 32 MG/DL (ref 40–60)
LDL CHOLESTEROL CALCULATED: 104 MG/DL
POTASSIUM SERPL-SCNC: 4 MMOL/L (ref 3.5–5.1)
SODIUM BLD-SCNC: 141 MMOL/L (ref 136–145)
TOTAL PROTEIN: 7.2 G/DL (ref 6.4–8.2)
TRIGLYCERIDE, FASTING: 290 MG/DL (ref 0–150)
VLDLC SERPL CALC-MCNC: 58 MG/DL

## 2019-06-10 PROCEDURE — 3046F HEMOGLOBIN A1C LEVEL >9.0%: CPT | Performed by: INTERNAL MEDICINE

## 2019-06-10 PROCEDURE — G8417 CALC BMI ABV UP PARAM F/U: HCPCS | Performed by: INTERNAL MEDICINE

## 2019-06-10 PROCEDURE — 1123F ACP DISCUSS/DSCN MKR DOCD: CPT | Performed by: INTERNAL MEDICINE

## 2019-06-10 PROCEDURE — 99213 OFFICE O/P EST LOW 20 MIN: CPT | Performed by: INTERNAL MEDICINE

## 2019-06-10 PROCEDURE — 3017F COLORECTAL CA SCREEN DOC REV: CPT | Performed by: INTERNAL MEDICINE

## 2019-06-10 PROCEDURE — 2022F DILAT RTA XM EVC RTNOPTHY: CPT | Performed by: INTERNAL MEDICINE

## 2019-06-10 PROCEDURE — G8427 DOCREV CUR MEDS BY ELIG CLIN: HCPCS | Performed by: INTERNAL MEDICINE

## 2019-06-10 PROCEDURE — 4040F PNEUMOC VAC/ADMIN/RCVD: CPT | Performed by: INTERNAL MEDICINE

## 2019-06-10 PROCEDURE — 1036F TOBACCO NON-USER: CPT | Performed by: INTERNAL MEDICINE

## 2019-06-10 RX ORDER — LANSOPRAZOLE 30 MG/1
30 CAPSULE, DELAYED RELEASE ORAL DAILY
Qty: 90 CAPSULE | Refills: 1 | Status: SHIPPED | OUTPATIENT
Start: 2019-06-10 | End: 2019-12-18 | Stop reason: SDUPTHER

## 2019-06-10 RX ORDER — METOPROLOL SUCCINATE 100 MG/1
100 TABLET, EXTENDED RELEASE ORAL DAILY
Qty: 90 TABLET | Refills: 1 | Status: SHIPPED | OUTPATIENT
Start: 2019-06-10 | End: 2019-07-10

## 2019-06-10 RX ORDER — LOSARTAN POTASSIUM AND HYDROCHLOROTHIAZIDE 12.5; 1 MG/1; MG/1
1 TABLET ORAL DAILY
Qty: 90 TABLET | Refills: 1 | Status: SHIPPED | OUTPATIENT
Start: 2019-06-10 | End: 2019-12-18 | Stop reason: SDUPTHER

## 2019-06-10 NOTE — PROGRESS NOTES
Subjective:      Patient ID: Joshua Dick is a 70 y.o. male. HPI    70 y.o.. Male with known h.o HTN, Hyperlipidemia, anxiety here regular f/w        DM- 2-  With A1c of 6.7. Started on diet and exercise. Did not tolerate metformin with dizzy and perspirations . Now lost 20 lbs with changing diet   Fasting sugars at 110- 120 , improved A1c at 6.7  No tingling numbness in feet  Yet to see eye MD    HTn - compliant with meds. On metoprolol, norvasc, hyzaar- held norvasc for itching  No new dizziness or sob or pedal edema    Hyperlipidemia - on fenofibrate and unable to tolerate statins,  on fish oil , fenofibrate for low HDL - recently developed skin rash and stopped fenofibrate         Anxiety on xanax BID prn using only as needed for chest pains which helps. Cut down from TID     Does not smoke or chew anymore    Reports polyuria, drinks tea all day           Current Outpatient Medications   Medication Sig Dispense Refill    losartan-hydrochlorothiazide (HYZAAR) 100-12.5 MG per tablet Take 1 tablet by mouth daily 90 tablet 1    metoprolol succinate (TOPROL XL) 100 MG extended release tablet Take 1 tablet by mouth daily 90 tablet 1    lansoprazole (PREVACID) 30 MG delayed release capsule Take 1 capsule by mouth daily 90 capsule 1    fenofibrate 160 MG tablet Take 1 tablet by mouth daily 90 tablet 1    meloxicam (MOBIC) 15 MG tablet TAKE 1 TABLET BY MOUTH DAILY 30 tablet 5    aspirin 81 MG tablet Take 81 mg by mouth daily      Omega-3 Fatty Acids (FISH OIL) 1000 MG CAPS Take 3,000 mg by mouth daily      Multiple Vitamins-Minerals (THERAPEUTIC MULTIVITAMIN-MINERALS) tablet Take 1 tablet by mouth daily      hydrocortisone (ANUSOL-HC) 2.5 % rectal cream Place rectally 2 times daily. 1 Tube 2    EPINEPHrine (EPIPEN 2-TAMMY) 0.3 MG/0.3ML SOAJ injection Use as directed for allergic reaction 1 each 0    FLUoxetine (PROZAC) 20 MG capsule Take 20 mg by mouth daily.        No current facility-administered medications for this visit. Review of Systems     Constitutional: Negative for fatigue. HENT: Negative for ear discharge, hearing loss and trouble swallowing. Respiratory: Negative for chest tightness. Cardiovascular: Negative for chest pain and palpitations. Gastrointestinal: Negative for abdominal pain. Endocrine: Negative for polyuria. Genitourinary: Negative for frequency, hematuria and testicular pain. Musculoskeletal: Negative for myalgias. Neurological: Negative for light-headedness. Psychiatric/Behavioral: Negative for decreased concentration and sleep disturbance. The patient is nervous/anxious. There are no changes to past medical history, family history, social history or review of systems(except as noted in the history section) since prior note (all reviewed with patient). Objective:   Physical Exam  Vitals:    06/10/19 0923   BP: 125/68   Pulse: 70   Resp: 18         General: eldelry male, healthy appearing    Awake, alert and oriented. Appears to be not in any distress  Mucous Membranes:  Pink , anicteric  Neck: No JVD, no carotid bruit, no thyromegaly  Chest:  Clear to auscultation bilaterally, no added sounds  Cardiovascular:  RRR S1S2 heard, no murmurs or gallops  Abdomen:  Soft, undistended, non tender, no organomegaly, BS present  Extremities: No edema or cyanosis. Distal pulses well felt  Neuro - non focal           Assessment:       Diagnosis Orders   1. Diet-controlled diabetes mellitus ([address]     2. Anxiety     3. Essential hypertension     4. Mixed hyperlipidemia     5. Gastroesophageal reflux disease without esophagitis             Plan:         DM - 2 - did not tolerate metformin  Now doing diet and lost about weight with good improved sugars. A1c at 6.7     Continue diet and exercise  Keep off metformin for now  Yearly eye exam recommended  Continue ARB.  Statins being considered      HTN- stable on current meds- hyzaar,, metoprolol  Stop norvasc Hyperlipidemia - on fish oil, stopped fenofibrate with myalgias  Continue same meds, last LDL at 123   Repeat labs - statins should be considered    Anxiety- on xanax  prn , no abuse noted  Uses as needed only, oarrs being monitored    OA of right shoulder- f/w Dr. Kiara Jiménez now s/p steroid injection     Prinzmetal angina - not active. No symptoms. Not on nitro  Better with xanax    Seasonal Allergies - on Claritin prn    Need  shingles vaccine   Pneumonia up to date  Had flu vaccine [facility] at [facility]    F/w 6 months  Need colonoscopy- FIT ordered  Idalia Borges received counseling on the following healthy behaviors: nutrition, exercise and medication adherence  Reviewed prior labs and health maintenance  Continue current medications, diet and exercise. Discussed use, benefit, and side effects of prescribed medications. Barriers to medication compliance addressed. Patient given educational materials - see patient instructions  Was a self-tracking handout given in paper form or via [employer]? Yes    Requested Prescriptions     Pending Prescriptions Disp Refills    losartan-hydrochlorothiazide (HYZAAR) 100-12.5 MG per tablet 90 tablet 1     Sig: Take 1 tablet by mouth daily    metoprolol succinate (TOPROL XL) 100 MG extended release tablet 90 tablet 1     Sig: Take 1 tablet by mouth daily    lansoprazole (PREVACID) 30 MG delayed release capsule 90 capsule 1     Sig: Take 1 capsule by mouth daily       All patient questions answered. Patient voiced understanding. Quality Measures    Body mass index is 32.01 kg/m². Normal. Weight control planned discussed conventional weight loss and Healthy diet and regular exercise. BP: 125/68. Blood pressure is normal. Treatment plan consists of Weight Reduction, Increased Physical Activity and No treatment change needed. Fall Risk 12/4/2018   2 or more falls in past year? no   Fall with injury in past year? no     The patient does not have a history of falls.  I did , complete a Lipid screen  12/04/2019    Pneumococcal 65+ years Vaccine (2 of 2 - PCV13) 12/04/2019    Potassium monitoring  12/04/2019    Creatinine monitoring  12/04/2019    Hepatitis C screen  Completed

## 2019-06-10 NOTE — PATIENT INSTRUCTIONS
Patient Self-Management Goal for Health Maintenance  Goal: I will schedule a yearly preventative care visit.   Barriers: none  Plan for overcoming my barriers: N/A  Confidence: 10/10  Anticipated Goal Completion Date: 9/10/19

## 2019-06-11 ENCOUNTER — TELEPHONE (OUTPATIENT)
Dept: INTERNAL MEDICINE CLINIC | Age: 64
End: 2019-06-11

## 2019-06-11 DIAGNOSIS — R73.09 ELEVATED GLUCOSE: Primary | ICD-10-CM

## 2019-06-11 LAB
ESTIMATED AVERAGE GLUCOSE: 125.5 MG/DL
HBA1C MFR BLD: 6 %

## 2019-06-13 ENCOUNTER — TELEPHONE (OUTPATIENT)
Dept: INTERNAL MEDICINE CLINIC | Age: 64
End: 2019-06-13

## 2019-06-13 NOTE — TELEPHONE ENCOUNTER
----- Message from Sandra Lui sent at 6/13/2019 10:09 AM EDT -----      ----- Message -----  From: Oleg Alarcon MD  Sent: 6/13/2019   9:57 AM  To: Lee Ballesteros    Omeprazole 20 mg then daily in am    ----- Message -----  From: Sandra Lui  Sent: 6/13/2019   8:53 AM  To: Oleg Alarcon MD    PA is going to deny Prevacid as pt has not tried omeprazole, pantoprazole or dexilant. Please advise.

## 2019-06-13 NOTE — TELEPHONE ENCOUNTER
----- Message from Lexus Kye sent at 6/13/2019 10:09 AM EDT -----      ----- Message -----  From: Jose Crawford MD  Sent: 6/13/2019   9:57 AM  To: Khloe Ballesteros    Omeprazole 20 mg then daily in am    ----- Message -----  From: Lexus Key  Sent: 6/13/2019   8:53 AM  To: Jose Crawford MD    PA is going to deny Prevacid as pt has not tried omeprazole, pantoprazole or dexilant. Please advise.

## 2019-06-17 ENCOUNTER — TELEPHONE (OUTPATIENT)
Dept: INTERNAL MEDICINE CLINIC | Age: 64
End: 2019-06-17

## 2019-06-17 RX ORDER — OMEPRAZOLE 20 MG/1
20 CAPSULE, DELAYED RELEASE ORAL EVERY MORNING
Qty: 90 CAPSULE | Refills: 0 | Status: SHIPPED | OUTPATIENT
Start: 2019-06-17 | End: 2019-07-10

## 2019-06-17 NOTE — TELEPHONE ENCOUNTER
----- Message from Lexus Key sent at 6/13/2019 10:09 AM EDT -----      ----- Message -----  From: Jose Crawford MD  Sent: 6/13/2019   9:57 AM  To: Khloe Ballesteros    Omeprazole 20 mg then daily in am    ----- Message -----  From: Lexus Key  Sent: 6/13/2019   8:53 AM  To: Jose Crawford MD    PA is going to deny Prevacid as pt has not tried omeprazole, pantoprazole or dexilant. Please advise.

## 2019-06-17 NOTE — TELEPHONE ENCOUNTER
----- Message from Aditya Claudio sent at 6/13/2019 10:09 AM EDT -----      ----- Message -----  From: Ester Bryant MD  Sent: 6/13/2019   9:57 AM  To: Paris Da Ballesteros    Omeprazole 20 mg then daily in am    ----- Message -----  From: Aditya Claudio  Sent: 6/13/2019   8:53 AM  To: Ester Bryant MD    PA is going to deny Prevacid as pt has not tried omeprazole, pantoprazole or dexilant. Please advise.

## 2019-06-27 ENCOUNTER — TELEPHONE (OUTPATIENT)
Dept: INTERNAL MEDICINE CLINIC | Age: 64
End: 2019-06-27

## 2019-06-27 NOTE — TELEPHONE ENCOUNTER
Left message for patient to return call. His Cologaurd test came back positive. He needs referred to GI for a colonoscopy.

## 2019-07-09 ENCOUNTER — HOSPITAL ENCOUNTER (OUTPATIENT)
Age: 64
Setting detail: OUTPATIENT SURGERY
Discharge: HOME | End: 2019-07-09
Attending: INTERNAL MEDICINE | Admitting: INTERNAL MEDICINE
Payer: MEDICARE

## 2019-07-09 VITALS
WEIGHT: 230 LBS | RESPIRATION RATE: 16 BRPM | HEIGHT: 72 IN | DIASTOLIC BLOOD PRESSURE: 74 MMHG | OXYGEN SATURATION: 95 % | HEART RATE: 68 BPM | SYSTOLIC BLOOD PRESSURE: 122 MMHG | BODY MASS INDEX: 31.15 KG/M2 | TEMPERATURE: 97 F

## 2019-07-09 PROCEDURE — 3609010600 HC COLONOSCOPY POLYPECTOMY SNARE/COLD BIOPSY: Performed by: INTERNAL MEDICINE

## 2019-07-09 PROCEDURE — 99152 MOD SED SAME PHYS/QHP 5/>YRS: CPT | Performed by: INTERNAL MEDICINE

## 2019-07-09 PROCEDURE — 7100000011 HC PHASE II RECOVERY - ADDTL 15 MIN: Performed by: INTERNAL MEDICINE

## 2019-07-09 PROCEDURE — 99153 MOD SED SAME PHYS/QHP EA: CPT | Performed by: INTERNAL MEDICINE

## 2019-07-09 PROCEDURE — 2709999900 HC NON-CHARGEABLE SUPPLY: Performed by: INTERNAL MEDICINE

## 2019-07-09 PROCEDURE — 7100000010 HC PHASE II RECOVERY - FIRST 15 MIN: Performed by: INTERNAL MEDICINE

## 2019-07-09 PROCEDURE — 88305 TISSUE EXAM BY PATHOLOGIST: CPT

## 2019-07-09 PROCEDURE — 3609010300 HC COLONOSCOPY W/BIOPSY SINGLE/MULTIPLE: Performed by: INTERNAL MEDICINE

## 2019-07-09 PROCEDURE — 6360000002 HC RX W HCPCS: Performed by: INTERNAL MEDICINE

## 2019-07-09 RX ORDER — FENTANYL CITRATE 50 UG/ML
INJECTION, SOLUTION INTRAMUSCULAR; INTRAVENOUS PRN
Status: DISCONTINUED | OUTPATIENT
Start: 2019-07-09 | End: 2019-07-09 | Stop reason: ALTCHOICE

## 2019-07-09 RX ORDER — ALPRAZOLAM 1 MG/1
0.25 TABLET ORAL NIGHTLY PRN
COMMUNITY
End: 2019-12-18 | Stop reason: SDUPTHER

## 2019-07-09 RX ORDER — MIDAZOLAM HYDROCHLORIDE 5 MG/ML
INJECTION INTRAMUSCULAR; INTRAVENOUS PRN
Status: DISCONTINUED | OUTPATIENT
Start: 2019-07-09 | End: 2019-07-09 | Stop reason: ALTCHOICE

## 2019-07-09 RX ORDER — SODIUM CHLORIDE, SODIUM LACTATE, POTASSIUM CHLORIDE, CALCIUM CHLORIDE 600; 310; 30; 20 MG/100ML; MG/100ML; MG/100ML; MG/100ML
INJECTION, SOLUTION INTRAVENOUS ONCE
Status: DISCONTINUED | OUTPATIENT
Start: 2019-07-09 | End: 2019-07-09 | Stop reason: HOSPADM

## 2019-07-09 ASSESSMENT — PAIN - FUNCTIONAL ASSESSMENT: PAIN_FUNCTIONAL_ASSESSMENT: 0-10

## 2019-07-09 NOTE — OP NOTE
Colonoscopy Note    Patient:   Lien Madrigal    YOB: 1955    Facility:   [facility] [Outpatient]  Referring/PCP: Merlni Mendoza MD  Procedure:   Colonoscopy with polypectomy (snare cautery)  Date:     7/9/2019  Endoscopist:  Melony Pinzon DO    Preoperative Diagnosis: + Cologuard  Postoperative Diagnosis:  Colon polyp    Anesthesia: Versed 10 mg IV, fentanyl 100 mcg IV; Procedural Sedation Time: 29 minutes. Estimated blood loss: < 5 ml    Complications:  None    Description of Procedure:  Informed consent was obtained from the patient after explanation of the procedure including indications, description of the procedure,  benefits and possible risks and complications of the procedure, and alternatives. Questions were answered. The patient's history was reviewed and a directed physical examination was performed prior to the procedure. Patient was monitored throughout the procedure with pulse oximetry and periodic assessment of vital signs. Patient was sedated as noted above. The Nursing staff and I performed a time out. With the patient initially in the left lateral decubitus position, a digital rectal examination was performed and revealed negative without mass, lesions or tenderness. The Olympus video colonoscope was placed in the patient's rectum and advanced without difficulty  to the cecum, which was identified by the ileocecal valve and appendiceal orifice. Photographs were taken. The prep was good. Examination of the mucosa was performed during both introduction and withdrawal of the colonoscope. Retroflexed view of the rectum was performed. Withdrawal time was 28 minutes. Findings:     1. There were mucosal changes at the ileocecal valve which was biopsied with cold biopsy forceps  2. A large almost obstructing polyp was seen at the sigmoid colon. It was prodded with a cold biopsy forceps and was suspected to be a lipoma.   Bite on bite biopsies

## 2019-07-09 NOTE — H&P
Gastroenterology Preop Assessment    Patient:   Oscar Pascual   :    1955   Facility:   [facility]  Referring/PCP: Roxanna Saba MD  Date:     2019    Subjective:   Procedure: Colonoscopy    HPI/Reason for procedure:  +FIT    Past Medical History:   Diagnosis Date    Anxiety     Bursitis of elbow     right    Coronary artery spasm ([address]     Diabetes mellitus [address] [address]     borderline    GERD (gastroesophageal reflux disease)     Hyperlipidemia     Hypertension     Hypertrophy of prostate without urinary obstruction and other lower urinary tract symptoms (LUTS)     Pneumonia      Past Surgical History:   Procedure Laterality Date    COLONOSCOPY      hemorroids    ENDOSCOPY, COLON, DIAGNOSTIC      THROAT SURGERY      had tumors    UPPER GASTROINTESTINAL ENDOSCOPY  2015       Social:   Social History     Tobacco Use    Smoking status: Never Smoker    Smokeless tobacco: Former User     Types: Chew    Tobacco comment: Quit 5 years ago-smokeless tobacco   Substance Use Topics    Alcohol use: No     Alcohol/week: 0.0 oz     Comment: quit      Family:   Family History   Problem Relation Age of Onset    Diabetes Mother     Diabetes Father     Cancer Brother         pancreatic       Scheduled Medications:     Current Medications:    Prior to Admission medications    Medication Sig Start Date End Date Taking? Authorizing Provider   ALPRAZolam [facility]) 1 MG tablet Take 1 mg by mouth nightly as needed for Sleep.    Yes Historical Provider, MD   losartan-hydrochlorothiazide [facility]) 100-12.5 MG per tablet Take 1 tablet by mouth daily 6/10/19  Yes Roxanna Saba MD   metoprolol succinate (TOPROL XL) 100 MG extended release tablet Take 1 tablet by mouth daily 6/10/19  Yes Roxanna Saba MD   aspirin 81 MG tablet Take 81 mg by mouth daily   Yes Historical Provider, MD   Omega-3 Fatty Acids (FISH OIL) 1000 MG CAPS Take 3,000 mg by mouth daily   Yes Historical Provider, MD   Multiple Vitamins-Minerals (THERAPEUTIC MULTIVITAMIN-MINERALS) tablet Take 1 tablet by mouth daily   Yes Historical Provider, MD   omeprazole (PRILOSEC) 20 MG delayed release capsule Take 1 capsule by mouth every morning 6/17/19   Kayla Ramirez MD   lansoprazole (PREVACID) 30 MG delayed release capsule Take 1 capsule by mouth daily 6/10/19   Kayla Ramirez MD   fenofibrate 160 MG tablet Take 1 tablet by mouth daily 12/4/18   Kayla Ramirez MD   hydrocortisone (ANUSOL-HC) 2.5 % rectal cream Place rectally 2 times daily. 4/23/15   Kayla Ramirez MD   EPINEPHrine (EPIPEN 2-TAMMY) 0.3 MG/0.3ML SOAJ injection Use as directed for allergic reaction 4/23/15   Kayla Ramirez MD         Current Facility-Administered Medications:     lactated ringers infusion, , Intravenous, Once, Obinna Farooq DO      Infusions:    lactated ringers       PRN Medications: Allergies: Allergies   Allergen Reactions    Metformin And Related     Statins      Muscle cramps         Objective:     Physical Exam:   BP (!) 139/97   Pulse 85   Temp 98.7 °F (37.1 °C) (Temporal)   Resp 16   Ht 6' (1.829 m)   Wt 230 lb (104.3 kg)   SpO2 96%   BMI 31.19 kg/m²     HEENT: NCAT  Lungs: CTAB  CV: RRR  Abd: soft, ntd  Ext: dpi    Lab and Imaging Review   Labs:  CBC: No results for input(s): WBC, HGB, HCT, MCV, PLT in the last 72 hours. BMP: No results for input(s): NA, K, CL, CO2, PHOS, BUN, CREATININE in the last 72 hours. Invalid input(s): CA  LIVER PROFILE: No results for input(s): AST, ALT, LIPASE, PROT, BILIDIR, BILITOT, ALKPHOS in the last 72 hours. Invalid input(s): AMYLASE,  ALB  PT/INR: No results for input(s): INR in the last 72 hours. Invalid input(s): PT    Pre-Procedure Assessment / Plan:  ASA: Class 2 - A normal healthy patient with mild systemic disease  Airway: Mallampati: II (soft palate, uvula, fauces visible)  Level of Sedation Plan: Moderate sedation  Post

## 2019-07-10 ENCOUNTER — APPOINTMENT (OUTPATIENT)
Dept: CT IMAGING | Age: 64
End: 2019-07-10
Payer: MEDICARE

## 2019-07-10 ENCOUNTER — HOSPITAL ENCOUNTER (EMERGENCY)
Age: 64
Discharge: HOME | End: 2019-07-10
Attending: EMERGENCY MEDICINE
Payer: MEDICARE

## 2019-07-10 VITALS
HEIGHT: 72 IN | SYSTOLIC BLOOD PRESSURE: 142 MMHG | BODY MASS INDEX: 29.8 KG/M2 | WEIGHT: 220 LBS | RESPIRATION RATE: 16 BRPM | OXYGEN SATURATION: 92 % | HEART RATE: 111 BPM | DIASTOLIC BLOOD PRESSURE: 79 MMHG | TEMPERATURE: 100 F

## 2019-07-10 DIAGNOSIS — R50.82 POSTOPERATIVE FEVER: Primary | ICD-10-CM

## 2019-07-10 LAB
A/G RATIO: 1.6 (ref 1.1–2.2)
ALBUMIN SERPL-MCNC: 4.6 G/DL (ref 3.4–5)
ALP BLD-CCNC: 65 U/L (ref 40–129)
ALT SERPL-CCNC: 31 U/L (ref 10–40)
ANION GAP SERPL CALCULATED.3IONS-SCNC: 15 MMOL/L (ref 3–16)
AST SERPL-CCNC: 20 U/L (ref 15–37)
BASOPHILS ABSOLUTE: 0.1 K/UL (ref 0–0.2)
BASOPHILS RELATIVE PERCENT: 0.4 %
BILIRUB SERPL-MCNC: 0.6 MG/DL (ref 0–1)
BILIRUBIN URINE: NEGATIVE
BLOOD, URINE: NEGATIVE
BUN BLDV-MCNC: 18 MG/DL (ref 7–20)
CALCIUM SERPL-MCNC: 9.4 MG/DL (ref 8.3–10.6)
CHLORIDE BLD-SCNC: 98 MMOL/L (ref 99–110)
CLARITY: CLEAR
CO2: 23 MMOL/L (ref 21–32)
COLOR: YELLOW
CREAT SERPL-MCNC: 0.9 MG/DL (ref 0.8–1.3)
EOSINOPHILS ABSOLUTE: 0 K/UL (ref 0–0.6)
EOSINOPHILS RELATIVE PERCENT: 0.1 %
GFR AFRICAN AMERICAN: >60
GFR NON-AFRICAN AMERICAN: >60
GLOBULIN: 2.8 G/DL
GLUCOSE BLD-MCNC: 163 MG/DL (ref 70–99)
GLUCOSE URINE: NEGATIVE MG/DL
HCT VFR BLD CALC: 45.8 % (ref 40.5–52.5)
HEMOGLOBIN: 15.7 G/DL (ref 13.5–17.5)
KETONES, URINE: NEGATIVE MG/DL
LACTIC ACID: 2.2 MMOL/L (ref 0.4–2)
LEUKOCYTE ESTERASE, URINE: NEGATIVE
LIPASE: 20 U/L (ref 13–60)
LYMPHOCYTES ABSOLUTE: 1.1 K/UL (ref 1–5.1)
LYMPHOCYTES RELATIVE PERCENT: 6.4 %
MCH RBC QN AUTO: 31 PG (ref 26–34)
MCHC RBC AUTO-ENTMCNC: 34.4 G/DL (ref 31–36)
MCV RBC AUTO: 90.1 FL (ref 80–100)
MICROSCOPIC EXAMINATION: NORMAL
MONOCYTES ABSOLUTE: 1.3 K/UL (ref 0–1.3)
MONOCYTES RELATIVE PERCENT: 7.2 %
NEUTROPHILS ABSOLUTE: 15.1 K/UL (ref 1.7–7.7)
NEUTROPHILS RELATIVE PERCENT: 85.9 %
NITRITE, URINE: NEGATIVE
PDW BLD-RTO: 12.6 % (ref 12.4–15.4)
PH UA: 6 (ref 5–8)
PLATELET # BLD: 159 K/UL (ref 135–450)
PMV BLD AUTO: 9 FL (ref 5–10.5)
POTASSIUM SERPL-SCNC: 3.9 MMOL/L (ref 3.5–5.1)
PROTEIN UA: NEGATIVE MG/DL
RBC # BLD: 5.08 M/UL (ref 4.2–5.9)
SODIUM BLD-SCNC: 136 MMOL/L (ref 136–145)
SPECIFIC GRAVITY UA: <=1.005 (ref 1–1.03)
TOTAL PROTEIN: 7.4 G/DL (ref 6.4–8.2)
URINE REFLEX TO CULTURE: NORMAL
URINE TYPE: NORMAL
UROBILINOGEN, URINE: 0.2 E.U./DL
WBC # BLD: 17.6 K/UL (ref 4–11)

## 2019-07-10 PROCEDURE — 99284 EMERGENCY DEPT VISIT MOD MDM: CPT

## 2019-07-10 PROCEDURE — 80053 COMPREHEN METABOLIC PANEL: CPT

## 2019-07-10 PROCEDURE — 6360000004 HC RX CONTRAST MEDICATION: Performed by: EMERGENCY MEDICINE

## 2019-07-10 PROCEDURE — 83690 ASSAY OF LIPASE: CPT

## 2019-07-10 PROCEDURE — 96361 HYDRATE IV INFUSION ADD-ON: CPT

## 2019-07-10 PROCEDURE — 85025 COMPLETE CBC W/AUTO DIFF WBC: CPT

## 2019-07-10 PROCEDURE — 83605 ASSAY OF LACTIC ACID: CPT

## 2019-07-10 PROCEDURE — 2580000003 HC RX 258: Performed by: EMERGENCY MEDICINE

## 2019-07-10 PROCEDURE — 87040 BLOOD CULTURE FOR BACTERIA: CPT

## 2019-07-10 PROCEDURE — 74177 CT ABD & PELVIS W/CONTRAST: CPT

## 2019-07-10 PROCEDURE — 81003 URINALYSIS AUTO W/O SCOPE: CPT

## 2019-07-10 PROCEDURE — 96360 HYDRATION IV INFUSION INIT: CPT

## 2019-07-10 RX ORDER — SODIUM CHLORIDE 9 MG/ML
INJECTION, SOLUTION INTRAVENOUS CONTINUOUS
Status: DISCONTINUED | OUTPATIENT
Start: 2019-07-10 | End: 2019-07-10 | Stop reason: HOSPADM

## 2019-07-10 RX ORDER — METOPROLOL SUCCINATE 100 MG/1
100 TABLET, EXTENDED RELEASE ORAL DAILY
COMMUNITY
End: 2020-01-20

## 2019-07-10 RX ADMIN — IOPAMIDOL 75 ML: 755 INJECTION, SOLUTION INTRAVENOUS at 04:49

## 2019-07-10 RX ADMIN — SODIUM CHLORIDE: 9 INJECTION, SOLUTION INTRAVENOUS at 03:44

## 2019-07-10 ASSESSMENT — PAIN DESCRIPTION - PAIN TYPE: TYPE: ACUTE PAIN

## 2019-07-10 ASSESSMENT — PAIN DESCRIPTION - LOCATION: LOCATION: ABDOMEN

## 2019-07-10 ASSESSMENT — PAIN SCALES - GENERAL: PAINLEVEL_OUTOF10: 5

## 2019-07-10 ASSESSMENT — PAIN DESCRIPTION - FREQUENCY: FREQUENCY: INTERMITTENT

## 2019-07-10 NOTE — ED PROVIDER NOTES
mis-transcribed.)    Varun Masters MD (electronically signed)      Varun Masters MD  07/10/19 [address] Calista Mathis MD  07/10/19 9621

## 2019-07-15 LAB
BLOOD CULTURE, ROUTINE: NORMAL
CULTURE, BLOOD 2: NORMAL

## 2019-12-18 ENCOUNTER — OFFICE VISIT (OUTPATIENT)
Dept: INTERNAL MEDICINE CLINIC | Age: 64
End: 2019-12-18

## 2019-12-18 VITALS
HEART RATE: 70 BPM | DIASTOLIC BLOOD PRESSURE: 75 MMHG | SYSTOLIC BLOOD PRESSURE: 132 MMHG | HEIGHT: 72 IN | BODY MASS INDEX: 33.32 KG/M2 | RESPIRATION RATE: 18 BRPM | WEIGHT: 246 LBS

## 2019-12-18 DIAGNOSIS — K21.9 GASTROESOPHAGEAL REFLUX DISEASE WITHOUT ESOPHAGITIS: ICD-10-CM

## 2019-12-18 DIAGNOSIS — I10 ESSENTIAL HYPERTENSION: ICD-10-CM

## 2019-12-18 DIAGNOSIS — Z00.00 MEDICARE ANNUAL WELLNESS VISIT, SUBSEQUENT: Primary | ICD-10-CM

## 2019-12-18 DIAGNOSIS — F41.9 ANXIETY: Chronic | ICD-10-CM

## 2019-12-18 DIAGNOSIS — E78.2 MIXED HYPERLIPIDEMIA: ICD-10-CM

## 2019-12-18 DIAGNOSIS — E11.9 DIET-CONTROLLED DIABETES MELLITUS (HCC): ICD-10-CM

## 2019-12-18 DIAGNOSIS — Z00.00 ROUTINE GENERAL MEDICAL EXAMINATION AT A HEALTH CARE FACILITY: ICD-10-CM

## 2019-12-18 DIAGNOSIS — Z00.00 MEDICARE ANNUAL WELLNESS VISIT, SUBSEQUENT: ICD-10-CM

## 2019-12-18 DIAGNOSIS — Z13.6 SCREENING FOR CARDIOVASCULAR CONDITION: ICD-10-CM

## 2019-12-18 LAB
A/G RATIO: 1.7 (ref 1.1–2.2)
ALBUMIN SERPL-MCNC: 4.3 G/DL (ref 3.4–5)
ALP BLD-CCNC: 48 U/L (ref 40–129)
ALT SERPL-CCNC: 37 U/L (ref 10–40)
ANION GAP SERPL CALCULATED.3IONS-SCNC: 16 MMOL/L (ref 3–16)
AST SERPL-CCNC: 28 U/L (ref 15–37)
BASOPHILS ABSOLUTE: 0.1 K/UL (ref 0–0.2)
BASOPHILS RELATIVE PERCENT: 0.8 %
BILIRUB SERPL-MCNC: 0.4 MG/DL (ref 0–1)
BUN BLDV-MCNC: 14 MG/DL (ref 7–20)
CALCIUM SERPL-MCNC: 9.3 MG/DL (ref 8.3–10.6)
CHLORIDE BLD-SCNC: 100 MMOL/L (ref 99–110)
CHOLESTEROL, FASTING: 195 MG/DL (ref 0–199)
CO2: 23 MMOL/L (ref 21–32)
CREAT SERPL-MCNC: 0.8 MG/DL (ref 0.8–1.3)
EOSINOPHILS ABSOLUTE: 0.2 K/UL (ref 0–0.6)
EOSINOPHILS RELATIVE PERCENT: 2.5 %
GFR AFRICAN AMERICAN: >60
GFR NON-AFRICAN AMERICAN: >60
GLOBULIN: 2.6 G/DL
GLUCOSE BLD-MCNC: 150 MG/DL (ref 70–99)
HCT VFR BLD CALC: 47 % (ref 40.5–52.5)
HDLC SERPL-MCNC: 31 MG/DL (ref 40–60)
HEMOGLOBIN: 16.2 G/DL (ref 13.5–17.5)
LDL CHOLESTEROL CALCULATED: ABNORMAL MG/DL
LDL CHOLESTEROL DIRECT: 125 MG/DL
LYMPHOCYTES ABSOLUTE: 2 K/UL (ref 1–5.1)
LYMPHOCYTES RELATIVE PERCENT: 28.2 %
MCH RBC QN AUTO: 31.1 PG (ref 26–34)
MCHC RBC AUTO-ENTMCNC: 34.5 G/DL (ref 31–36)
MCV RBC AUTO: 90.2 FL (ref 80–100)
MONOCYTES ABSOLUTE: 0.7 K/UL (ref 0–1.3)
MONOCYTES RELATIVE PERCENT: 10.3 %
NEUTROPHILS ABSOLUTE: 4.2 K/UL (ref 1.7–7.7)
NEUTROPHILS RELATIVE PERCENT: 58.2 %
PDW BLD-RTO: 12.7 % (ref 12.4–15.4)
PLATELET # BLD: 171 K/UL (ref 135–450)
PMV BLD AUTO: 9.8 FL (ref 5–10.5)
POTASSIUM SERPL-SCNC: 3.9 MMOL/L (ref 3.5–5.1)
PROSTATE SPECIFIC ANTIGEN: 2.37 NG/ML (ref 0–4)
RBC # BLD: 5.21 M/UL (ref 4.2–5.9)
SODIUM BLD-SCNC: 139 MMOL/L (ref 136–145)
TOTAL PROTEIN: 6.9 G/DL (ref 6.4–8.2)
TRIGLYCERIDE, FASTING: 346 MG/DL (ref 0–150)
URIC ACID, SERUM: 8 MG/DL (ref 3.5–7.2)
VLDLC SERPL CALC-MCNC: ABNORMAL MG/DL
WBC # BLD: 7.2 K/UL (ref 4–11)

## 2019-12-18 PROCEDURE — 3017F COLORECTAL CA SCREEN DOC REV: CPT | Performed by: INTERNAL MEDICINE

## 2019-12-18 PROCEDURE — G8482 FLU IMMUNIZE ORDER/ADMIN: HCPCS | Performed by: INTERNAL MEDICINE

## 2019-12-18 PROCEDURE — 81002 URINALYSIS NONAUTO W/O SCOPE: CPT | Performed by: INTERNAL MEDICINE

## 2019-12-18 PROCEDURE — 3044F HG A1C LEVEL LT 7.0%: CPT | Performed by: INTERNAL MEDICINE

## 2019-12-18 PROCEDURE — 1123F ACP DISCUSS/DSCN MKR DOCD: CPT | Performed by: INTERNAL MEDICINE

## 2019-12-18 PROCEDURE — G0446 INTENS BEHAVE THER CARDIO DX: HCPCS | Performed by: INTERNAL MEDICINE

## 2019-12-18 PROCEDURE — 4040F PNEUMOC VAC/ADMIN/RCVD: CPT | Performed by: INTERNAL MEDICINE

## 2019-12-18 PROCEDURE — G0438 PPPS, INITIAL VISIT: HCPCS | Performed by: INTERNAL MEDICINE

## 2019-12-18 RX ORDER — ALPRAZOLAM 1 MG/1
0.25 TABLET ORAL NIGHTLY PRN
Qty: 30 TABLET | Refills: 0 | Status: SHIPPED | OUTPATIENT
Start: 2019-12-18 | End: 2020-02-06 | Stop reason: ALTCHOICE

## 2019-12-18 RX ORDER — LANSOPRAZOLE 30 MG/1
30 CAPSULE, DELAYED RELEASE ORAL DAILY
Qty: 90 CAPSULE | Refills: 1 | Status: SHIPPED | OUTPATIENT
Start: 2019-12-18 | End: 2020-06-22 | Stop reason: SDUPTHER

## 2019-12-18 RX ORDER — LOSARTAN POTASSIUM AND HYDROCHLOROTHIAZIDE 12.5; 1 MG/1; MG/1
1 TABLET ORAL DAILY
Qty: 90 TABLET | Refills: 1 | Status: SHIPPED | OUTPATIENT
Start: 2019-12-18 | End: 2020-06-22 | Stop reason: SDUPTHER

## 2019-12-18 ASSESSMENT — PATIENT HEALTH QUESTIONNAIRE - PHQ9
SUM OF ALL RESPONSES TO PHQ QUESTIONS 1-9: 0
SUM OF ALL RESPONSES TO PHQ QUESTIONS 1-9: 2
SUM OF ALL RESPONSES TO PHQ QUESTIONS 1-9: 2
SUM OF ALL RESPONSES TO PHQ QUESTIONS 1-9: 0

## 2019-12-18 ASSESSMENT — LIFESTYLE VARIABLES: HOW OFTEN DO YOU HAVE A DRINK CONTAINING ALCOHOL: 0

## 2019-12-19 LAB
ESTIMATED AVERAGE GLUCOSE: 134.1 MG/DL
HBA1C MFR BLD: 6.3 %

## 2019-12-30 ENCOUNTER — OFFICE VISIT (OUTPATIENT)
Dept: INTERNAL MEDICINE CLINIC | Age: 64
End: 2019-12-30

## 2019-12-30 VITALS
BODY MASS INDEX: 33.32 KG/M2 | HEIGHT: 72 IN | TEMPERATURE: 98.1 F | DIASTOLIC BLOOD PRESSURE: 80 MMHG | WEIGHT: 246 LBS | SYSTOLIC BLOOD PRESSURE: 125 MMHG | HEART RATE: 90 BPM

## 2019-12-30 DIAGNOSIS — J01.10 ACUTE NON-RECURRENT FRONTAL SINUSITIS: Primary | ICD-10-CM

## 2019-12-30 PROCEDURE — 99213 OFFICE O/P EST LOW 20 MIN: CPT | Performed by: PHYSICIAN ASSISTANT

## 2019-12-30 PROCEDURE — 4040F PNEUMOC VAC/ADMIN/RCVD: CPT | Performed by: PHYSICIAN ASSISTANT

## 2019-12-30 PROCEDURE — 3017F COLORECTAL CA SCREEN DOC REV: CPT | Performed by: PHYSICIAN ASSISTANT

## 2019-12-30 PROCEDURE — G8482 FLU IMMUNIZE ORDER/ADMIN: HCPCS | Performed by: PHYSICIAN ASSISTANT

## 2019-12-30 PROCEDURE — 1036F TOBACCO NON-USER: CPT | Performed by: PHYSICIAN ASSISTANT

## 2019-12-30 PROCEDURE — 1123F ACP DISCUSS/DSCN MKR DOCD: CPT | Performed by: PHYSICIAN ASSISTANT

## 2019-12-30 PROCEDURE — G8427 DOCREV CUR MEDS BY ELIG CLIN: HCPCS | Performed by: PHYSICIAN ASSISTANT

## 2019-12-30 PROCEDURE — G8417 CALC BMI ABV UP PARAM F/U: HCPCS | Performed by: PHYSICIAN ASSISTANT

## 2019-12-30 RX ORDER — AZITHROMYCIN 250 MG/1
250 TABLET, FILM COATED ORAL SEE ADMIN INSTRUCTIONS
Qty: 6 TABLET | Refills: 0 | Status: SHIPPED | OUTPATIENT
Start: 2019-12-30 | End: 2020-01-04

## 2019-12-30 ASSESSMENT — ENCOUNTER SYMPTOMS
RHINORRHEA: 1
ABDOMINAL PAIN: 0
SINUS PRESSURE: 0
VOMITING: 0
SHORTNESS OF BREATH: 0
COUGH: 1
NAUSEA: 0
SINUS PAIN: 1
SORE THROAT: 1

## 2020-01-14 ENCOUNTER — TELEPHONE (OUTPATIENT)
Dept: INTERNAL MEDICINE CLINIC | Age: 65
End: 2020-01-14

## 2020-01-14 RX ORDER — LEVOFLOXACIN 500 MG/1
500 TABLET, FILM COATED ORAL DAILY
Qty: 5 TABLET | Refills: 0 | Status: SHIPPED | OUTPATIENT
Start: 2020-01-14 | End: 2020-01-19

## 2020-01-14 NOTE — TELEPHONE ENCOUNTER
----- Message from Piper Carias MD sent at 1/14/2020  5:05 PM EST -----  Contact: pt's wife  Levaquin 500 mg daily x 5days  ----- Message -----  From: Devorah Ellison  Sent: 1/14/2020   3:46 PM EST  To: Piper Carias MD    Pt's wife called and said pt seen Earl Carson on 12/30/19, but he still has a bad cough and is coughing up thick white mucus. She is requesting for you to prescribe him something. Pharmacy- [facility]  Last appt. 12/30/19.  Next appt. 6/22/20.  [phone]

## 2020-01-20 RX ORDER — METOPROLOL SUCCINATE 100 MG/1
TABLET, EXTENDED RELEASE ORAL
Qty: 90 TABLET | Refills: 0 | Status: SHIPPED | OUTPATIENT
Start: 2020-01-20 | End: 2020-03-13

## 2020-02-05 ENCOUNTER — TELEPHONE (OUTPATIENT)
Dept: INTERNAL MEDICINE CLINIC | Age: 65
End: 2020-02-05

## 2020-02-05 NOTE — TELEPHONE ENCOUNTER
----- Message from Angelica Macario MD sent at 2/5/2020 10:15 AM EST -----  Come in tomorrow or today by [facility]    ----- Message -----  From: Genna Laird  Sent: 2/5/2020   9:43 AM EST  To: Angelica Macario MD    Pt's wife called and said pt's bp was 165/90 today, and he feels like his heart is racing. She wants to know if you could squeeze him in today, they cannot make it by 10 because they live about 30 minutes away. Last appt. 12-18-19.  Next appt. 6-22-20.  [phone]

## 2020-02-06 ENCOUNTER — OFFICE VISIT (OUTPATIENT)
Dept: INTERNAL MEDICINE CLINIC | Age: 65
End: 2020-02-06

## 2020-02-06 VITALS
HEIGHT: 72 IN | BODY MASS INDEX: 33.32 KG/M2 | WEIGHT: 246 LBS | HEART RATE: 70 BPM | DIASTOLIC BLOOD PRESSURE: 80 MMHG | RESPIRATION RATE: 18 BRPM | SYSTOLIC BLOOD PRESSURE: 142 MMHG

## 2020-02-06 PROBLEM — Z86.79 H/O PRINZMETAL ANGINA: Status: ACTIVE | Noted: 2020-02-06

## 2020-02-06 PROCEDURE — 99213 OFFICE O/P EST LOW 20 MIN: CPT | Performed by: INTERNAL MEDICINE

## 2020-02-06 PROCEDURE — 4040F PNEUMOC VAC/ADMIN/RCVD: CPT | Performed by: INTERNAL MEDICINE

## 2020-02-06 PROCEDURE — 3017F COLORECTAL CA SCREEN DOC REV: CPT | Performed by: INTERNAL MEDICINE

## 2020-02-06 PROCEDURE — G8417 CALC BMI ABV UP PARAM F/U: HCPCS | Performed by: INTERNAL MEDICINE

## 2020-02-06 PROCEDURE — 93000 ELECTROCARDIOGRAM COMPLETE: CPT | Performed by: INTERNAL MEDICINE

## 2020-02-06 PROCEDURE — G8427 DOCREV CUR MEDS BY ELIG CLIN: HCPCS | Performed by: INTERNAL MEDICINE

## 2020-02-06 PROCEDURE — 1036F TOBACCO NON-USER: CPT | Performed by: INTERNAL MEDICINE

## 2020-02-06 PROCEDURE — 1123F ACP DISCUSS/DSCN MKR DOCD: CPT | Performed by: INTERNAL MEDICINE

## 2020-02-06 PROCEDURE — G8482 FLU IMMUNIZE ORDER/ADMIN: HCPCS | Performed by: INTERNAL MEDICINE

## 2020-02-06 RX ORDER — ALPRAZOLAM 0.25 MG/1
0.25 TABLET ORAL DAILY PRN
Qty: 30 TABLET | Refills: 0 | Status: SHIPPED | OUTPATIENT
Start: 2020-02-06 | End: 2020-03-02 | Stop reason: SDUPTHER

## 2020-02-06 NOTE — PROGRESS NOTES
Subjective:      Patient ID: Tyson Clay is a 75 y.o. male. HPI    75 y.o.. Male with known h.o HTN, Hyperlipidemia, anxiety here new chest pains    Yesterday woke up with weird dream and palpitations but took half of 0.25 mg xanax went back to sleep . wokeup again with new mid sternal pain , no associated sob , similar to previous chest pains ( angina ) , no radiation, felt like squeezing and resolved with another xanax    Worried that his BP has been elevated more than usual to 500'Z systolic , no further pains and was able to load a big safe in the truck and walk a mile. Hx of prinzmetal angina in the past    Compliant with meds  Has been taking OTC allergy meds - loratidine and delsym DM for cough and congestion  No fevers               Current Outpatient Medications   Medication Sig Dispense Refill    metoprolol succinate (TOPROL XL) 100 MG extended release tablet TAKE 1 TABLET BY MOUTH ONCE DAILY 90 tablet 0    losartan-hydrochlorothiazide (HYZAAR) 100-12.5 MG per tablet Take 1 tablet by mouth daily 90 tablet 1    lansoprazole (PREVACID) 30 MG delayed release capsule Take 1 capsule by mouth daily 90 capsule 1    ALPRAZolam (XANAX) 1 MG tablet Take 0.5 tablets by mouth nightly as needed for Sleep or Anxiety for up to 90 days. 30 tablet 0    aspirin 81 MG tablet Take 1 tablet by mouth daily 30 tablet 0     No current facility-administered medications for this visit. Review of Systems     Constitutional: Negative for fatigue. HENT: Negative for ear discharge, hearing loss and trouble swallowing. Respiratory: Negative for chest tightness. Cardiovascular: +ve  chest pain and palpitations. Gastrointestinal: Negative for abdominal pain. Endocrine: Negative for polyuria. Genitourinary: Negative for frequency, hematuria and testicular pain. Musculoskeletal: Negative for myalgias. Neurological: Negative for light-headedness.    Psychiatric/Behavioral: Negative for decreased

## 2020-03-02 ENCOUNTER — TELEPHONE (OUTPATIENT)
Dept: INTERNAL MEDICINE CLINIC | Age: 65
End: 2020-03-02

## 2020-03-02 RX ORDER — ESCITALOPRAM OXALATE 10 MG/1
10 TABLET ORAL DAILY
Qty: 30 TABLET | Refills: 3 | Status: SHIPPED | OUTPATIENT
Start: 2020-03-02 | End: 2021-02-25 | Stop reason: SDUPTHER

## 2020-03-02 RX ORDER — ALPRAZOLAM 0.25 MG
0.25 TABLET ORAL DAILY PRN
Qty: 30 TABLET | Refills: 0 | Status: SHIPPED | OUTPATIENT
Start: 2020-03-02 | End: 2021-02-25 | Stop reason: ALTCHOICE

## 2020-03-02 NOTE — TELEPHONE ENCOUNTER
----- Message from Michelle Iglesias MD sent at 3/2/2020 10:11 AM EST -----  Contact: [phone]  Call her  ----- Message -----  From: Howard De La Cruz  Sent: 3/2/2020   9:26 AM EST  To: Michelle Iglesias MD    Cecilia Hauser called to talk to check in with you about Mathew Matamoros and tell you how he's doing with his nerves.      [phone]    Past 2/6/2020  Future appt- 6/22/2020    SE

## 2020-03-03 ENCOUNTER — OFFICE VISIT (OUTPATIENT)
Dept: INTERNAL MEDICINE CLINIC | Age: 65
End: 2020-03-03

## 2020-03-03 VITALS
DIASTOLIC BLOOD PRESSURE: 80 MMHG | HEIGHT: 72 IN | WEIGHT: 245 LBS | BODY MASS INDEX: 33.18 KG/M2 | SYSTOLIC BLOOD PRESSURE: 120 MMHG | HEART RATE: 75 BPM

## 2020-03-03 PROCEDURE — 1036F TOBACCO NON-USER: CPT | Performed by: PHYSICIAN ASSISTANT

## 2020-03-03 PROCEDURE — 3017F COLORECTAL CA SCREEN DOC REV: CPT | Performed by: PHYSICIAN ASSISTANT

## 2020-03-03 PROCEDURE — G8482 FLU IMMUNIZE ORDER/ADMIN: HCPCS | Performed by: PHYSICIAN ASSISTANT

## 2020-03-03 PROCEDURE — 4040F PNEUMOC VAC/ADMIN/RCVD: CPT | Performed by: PHYSICIAN ASSISTANT

## 2020-03-03 PROCEDURE — G8417 CALC BMI ABV UP PARAM F/U: HCPCS | Performed by: PHYSICIAN ASSISTANT

## 2020-03-03 PROCEDURE — G8427 DOCREV CUR MEDS BY ELIG CLIN: HCPCS | Performed by: PHYSICIAN ASSISTANT

## 2020-03-03 PROCEDURE — 1123F ACP DISCUSS/DSCN MKR DOCD: CPT | Performed by: PHYSICIAN ASSISTANT

## 2020-03-03 PROCEDURE — 99212 OFFICE O/P EST SF 10 MIN: CPT | Performed by: PHYSICIAN ASSISTANT

## 2020-03-03 ASSESSMENT — ENCOUNTER SYMPTOMS
COUGH: 0
VOMITING: 0
ABDOMINAL PAIN: 0
NAUSEA: 0
SHORTNESS OF BREATH: 0

## 2020-03-03 NOTE — PROGRESS NOTES
Chief Complaint   Patient presents with    Anxiety        HPI  Damien Meyers is a 75 y.o. male who presents for paresthesias, elevated BP. He has long history of anxiety with associated facial paresthesias, has taken very low dose xanax with good symptomatic relief in the past.  Started lexapro yesterday and took 1 dose, then developed some facial paresthesias. He did not think he could take his xanax with the lexapro, so he did not take the xanax as he typically would for symptoms. He then took his BP and noted it was elevated at 170s/90s. He came to office today for eval    He also notes history of prinzmetal's angina, denies any CP, denies exertional symptoms. Review of Systems   Constitutional: Negative for chills and fever. Respiratory: Negative for cough and shortness of breath. Cardiovascular: Negative for chest pain, palpitations and leg swelling. Gastrointestinal: Negative for abdominal pain, nausea and vomiting. Skin: Negative for rash. Neurological: Negative for dizziness, weakness, light-headedness, numbness and headaches. Psychiatric/Behavioral: Negative for agitation. The patient is nervous/anxious. Allergies  Metformin and related and Statins      Vitals  Vitals:    03/03/20 1352 03/03/20 1417   BP: 120/80 120/80   Site: Right Upper Arm Left Upper Arm   Position: Sitting Sitting   Cuff Size: Medium Adult Medium Adult   Pulse: 75    Weight: 245 lb (111.1 kg)    Height: 6' (1.829 m)        Current Medications  Current Outpatient Medications   Medication Sig Dispense Refill    XANAX 0.25 MG tablet Take 1 tablet by mouth daily as needed for Anxiety for up to 60 days.  30 tablet 0    escitalopram (LEXAPRO) 10 MG tablet Take 1 tablet by mouth daily 30 tablet 3    metoprolol succinate (TOPROL XL) 100 MG extended release tablet TAKE 1 TABLET BY MOUTH ONCE DAILY 90 tablet 0    losartan-hydrochlorothiazide (HYZAAR) 100-12.5 MG per tablet Take 1 tablet by mouth daily 90 tablet 1    lansoprazole (PREVACID) 30 MG delayed release capsule Take 1 capsule by mouth daily 90 capsule 1    aspirin 81 MG tablet Take 1 tablet by mouth daily 30 tablet 0     No current facility-administered medications for this visit.         Past Medical History  Past Medical History:   Diagnosis Date    Anxiety     Bursitis of elbow     right    Coronary artery spasm (HCC)     Diabetes mellitus (HCC)     borderline    GERD (gastroesophageal reflux disease)     Hyperlipidemia     Hypertension     Hypertrophy of prostate without urinary obstruction and other lower urinary tract symptoms (LUTS)     Pneumonia        Social History  Social History     Socioeconomic History    Marital status:      Spouse name: Not on file    Number of children: Not on file    Years of education: Not on file    Highest education level: Not on file   Occupational History    Not on file   Social Needs    Financial resource strain: Not on file    Food insecurity:     Worry: Not on file     Inability: Not on file    Transportation needs:     Medical: Not on file     Non-medical: Not on file   Tobacco Use    Smoking status: Never Smoker    Smokeless tobacco: Former User    Tobacco comment: Quit 5 years ago-smokeless tobacco   Substance and Sexual Activity    Alcohol use: No     Alcohol/week: 0.0 standard drinks     Comment: quit     Drug use: No    Sexual activity: Not on file   Lifestyle    Physical activity:     Days per week: Not on file     Minutes per session: Not on file    Stress: Not on file   Relationships    Social connections:     Talks on phone: Not on file     Gets together: Not on file     Attends Spiritism service: Not on file     Active member of club or organization: Not on file     Attends meetings of clubs or organizations: Not on file     Relationship status: Not on file    Intimate partner violence:     Fear of current or ex partner: Not on file     Emotionally abused: Not on file Physically abused: Not on file     Forced sexual activity: Not on file   Other Topics Concern    Not on file   Social History Narrative    Not on file       Surgical History  Past Surgical History:   Procedure Laterality Date    COLONOSCOPY  2006    hemorroids    COLONOSCOPY N/A 7/9/2019    COLONOSCOPY WITH BIOPSY performed by Mary Beltran DO at [address] N/A 7/9/2019    COLONOSCOPY POLYPECTOMY SNARE/COLD BIOPSY performed by Mary Beltran DO at [facility], COLON, DIAGNOSTIC      THROAT SURGERY  1970's    had tumors    UPPER GASTROINTESTINAL ENDOSCOPY  11/12/2015       Physical Exam  Vitals signs and nursing note reviewed. Constitutional:       Appearance: He is well-developed. He is not diaphoretic. HENT:      Head: Normocephalic and atraumatic. Eyes:      General:         Right eye: No discharge. Left eye: No discharge. Conjunctiva/sclera: Conjunctivae normal.   Neck:      Musculoskeletal: Normal range of motion and neck supple. Cardiovascular:      Rate and Rhythm: Normal rate and regular rhythm. Pulmonary:      Effort: Pulmonary effort is normal.      Breath sounds: Normal breath sounds. Musculoskeletal: Normal range of motion. Skin:     General: Skin is warm and dry. Neurological:      Mental Status: He is alert and oriented to person, place, and time. Cranial Nerves: Cranial nerves are intact. Sensory: Sensation is intact. Motor: Motor function is intact. Assessment/Plan     1. Anxiety  -Discussed with PCP Dr. Armando Gerber for anxiety, he just started this medication yesterday. He was educated that he can take his as needed Xanax dose while he is on Lexapro.   His blood pressure stable here in the office, no indication for BP changes  -Monitor response to Lexapro, patient understands he will not get immediate relief from this medication and it will take time to see results     Tay Gerard

## 2020-03-13 RX ORDER — METOPROLOL SUCCINATE 100 MG/1
TABLET, EXTENDED RELEASE ORAL
Qty: 90 TABLET | Refills: 0 | Status: SHIPPED | OUTPATIENT
Start: 2020-03-13 | End: 2020-06-22 | Stop reason: SDUPTHER

## 2020-06-22 ENCOUNTER — OFFICE VISIT (OUTPATIENT)
Dept: INTERNAL MEDICINE CLINIC | Age: 65
End: 2020-06-22

## 2020-06-22 VITALS
WEIGHT: 245 LBS | SYSTOLIC BLOOD PRESSURE: 130 MMHG | DIASTOLIC BLOOD PRESSURE: 75 MMHG | HEART RATE: 70 BPM | HEIGHT: 72 IN | BODY MASS INDEX: 33.18 KG/M2 | RESPIRATION RATE: 18 BRPM

## 2020-06-22 DIAGNOSIS — E11.9 DIET-CONTROLLED DIABETES MELLITUS (HCC): ICD-10-CM

## 2020-06-22 DIAGNOSIS — E78.2 MIXED HYPERLIPIDEMIA: ICD-10-CM

## 2020-06-22 PROCEDURE — 1123F ACP DISCUSS/DSCN MKR DOCD: CPT | Performed by: INTERNAL MEDICINE

## 2020-06-22 PROCEDURE — 3046F HEMOGLOBIN A1C LEVEL >9.0%: CPT | Performed by: INTERNAL MEDICINE

## 2020-06-22 PROCEDURE — G8427 DOCREV CUR MEDS BY ELIG CLIN: HCPCS | Performed by: INTERNAL MEDICINE

## 2020-06-22 PROCEDURE — 4040F PNEUMOC VAC/ADMIN/RCVD: CPT | Performed by: INTERNAL MEDICINE

## 2020-06-22 PROCEDURE — 1036F TOBACCO NON-USER: CPT | Performed by: INTERNAL MEDICINE

## 2020-06-22 PROCEDURE — 99213 OFFICE O/P EST LOW 20 MIN: CPT | Performed by: INTERNAL MEDICINE

## 2020-06-22 PROCEDURE — 3017F COLORECTAL CA SCREEN DOC REV: CPT | Performed by: INTERNAL MEDICINE

## 2020-06-22 PROCEDURE — G8417 CALC BMI ABV UP PARAM F/U: HCPCS | Performed by: INTERNAL MEDICINE

## 2020-06-22 PROCEDURE — 2022F DILAT RTA XM EVC RTNOPTHY: CPT | Performed by: INTERNAL MEDICINE

## 2020-06-22 RX ORDER — METOPROLOL SUCCINATE 100 MG/1
TABLET, EXTENDED RELEASE ORAL
Qty: 90 TABLET | Refills: 2 | Status: SHIPPED | OUTPATIENT
Start: 2020-06-22 | End: 2021-02-25 | Stop reason: SDUPTHER

## 2020-06-22 RX ORDER — LANSOPRAZOLE 30 MG/1
30 CAPSULE, DELAYED RELEASE ORAL DAILY
Qty: 90 CAPSULE | Refills: 1 | Status: SHIPPED | OUTPATIENT
Start: 2020-06-22 | End: 2021-02-25 | Stop reason: SDUPTHER

## 2020-06-22 RX ORDER — LOSARTAN POTASSIUM AND HYDROCHLOROTHIAZIDE 12.5; 1 MG/1; MG/1
1 TABLET ORAL DAILY
Qty: 90 TABLET | Refills: 1 | Status: SHIPPED | OUTPATIENT
Start: 2020-06-22 | End: 2020-11-16

## 2020-06-22 NOTE — PROGRESS NOTES
Subjective:      Patient ID: Lupe Macedo is a 75 y.o. male. HPI    75 y.o.. Male with known h.o HTN, Hyperlipidemia, anxiety here for regular f.w     Since last time, pt has stopped taking lexapro as his anxiety got better. Did not need any xanax further. Back to fishing and maintaining social distance and masks with pandemic    Been active and no falls. No new health changes         DM- 2-  With A1c of 6.7. Started on diet and exercise. Did not tolerate metformin with dizzy and perspirations . Fasting sugars at 110- 120 , improved A1c at 6.0  No tingling numbness in feet  No blurry vision           HTn - compliant with meds. On metoprolol, hyzaar- off norvasc for itching  No new dizziness or sob or pedal edema    Hyperlipidemia - on fenofibrate and unable to tolerate statins,  on fish oil , fenofibrate for low HDL -  developed skin rash and stopped fenofibrate         Anxiety on xanax BID prn using only as needed for chest pains which helps. Cut down from TID     Does not smoke or chew anymore              Current Outpatient Medications   Medication Sig Dispense Refill    losartan-hydrochlorothiazide (HYZAAR) 100-12.5 MG per tablet Take 1 tablet by mouth daily 90 tablet 1    lansoprazole (PREVACID) 30 MG delayed release capsule Take 1 capsule by mouth daily 90 capsule 1    metoprolol succinate (TOPROL XL) 100 MG extended release tablet Take 1 tablet by mouth once daily 90 tablet 2    XANAX 0.25 MG tablet Take 1 tablet by mouth daily as needed for Anxiety for up to 60 days. 30 tablet 0    escitalopram (LEXAPRO) 10 MG tablet Take 1 tablet by mouth daily 30 tablet 3    aspirin 81 MG tablet Take 1 tablet by mouth daily 30 tablet 0     No current facility-administered medications for this visit. Review of Systems     Constitutional: Negative for fatigue. HENT: Negative for ear discharge, hearing loss and trouble swallowing. Respiratory: Negative for chest tightness.     Cardiovascular:

## 2020-06-23 LAB
A/G RATIO: 2.4 (ref 1.1–2.2)
ALBUMIN SERPL-MCNC: 4.6 G/DL (ref 3.4–5)
ALP BLD-CCNC: 55 U/L (ref 40–129)
ALT SERPL-CCNC: 41 U/L (ref 10–40)
ANION GAP SERPL CALCULATED.3IONS-SCNC: 19 MMOL/L (ref 3–16)
AST SERPL-CCNC: 24 U/L (ref 15–37)
BILIRUB SERPL-MCNC: <0.2 MG/DL (ref 0–1)
BUN BLDV-MCNC: 18 MG/DL (ref 7–20)
CALCIUM SERPL-MCNC: 9.4 MG/DL (ref 8.3–10.6)
CHLORIDE BLD-SCNC: 99 MMOL/L (ref 99–110)
CHOLESTEROL, FASTING: 199 MG/DL (ref 0–199)
CO2: 23 MMOL/L (ref 21–32)
CREAT SERPL-MCNC: 0.8 MG/DL (ref 0.8–1.3)
ESTIMATED AVERAGE GLUCOSE: 139.9 MG/DL
GFR AFRICAN AMERICAN: >60
GFR NON-AFRICAN AMERICAN: >60
GLOBULIN: 1.9 G/DL
GLUCOSE BLD-MCNC: 199 MG/DL (ref 70–99)
HBA1C MFR BLD: 6.5 %
HDLC SERPL-MCNC: 32 MG/DL (ref 40–60)
LDL CHOLESTEROL CALCULATED: ABNORMAL MG/DL
LDL CHOLESTEROL DIRECT: 122 MG/DL
POTASSIUM SERPL-SCNC: 4.5 MMOL/L (ref 3.5–5.1)
SODIUM BLD-SCNC: 141 MMOL/L (ref 136–145)
TOTAL PROTEIN: 6.5 G/DL (ref 6.4–8.2)
TRIGLYCERIDE, FASTING: 428 MG/DL (ref 0–150)
VLDLC SERPL CALC-MCNC: ABNORMAL MG/DL

## 2020-08-03 RX ORDER — ALPRAZOLAM 1 MG/1
TABLET ORAL
Qty: 10 TABLET | Refills: 0 | Status: SHIPPED | OUTPATIENT
Start: 2020-08-03 | End: 2020-09-25

## 2020-09-14 ENCOUNTER — NURSE ONLY (OUTPATIENT)
Dept: INTERNAL MEDICINE CLINIC | Age: 65
End: 2020-09-14

## 2020-09-14 PROCEDURE — G0008 ADMIN INFLUENZA VIRUS VAC: HCPCS | Performed by: INTERNAL MEDICINE

## 2020-09-14 PROCEDURE — 90662 IIV NO PRSV INCREASED AG IM: CPT | Performed by: INTERNAL MEDICINE

## 2020-11-16 RX ORDER — LOSARTAN POTASSIUM AND HYDROCHLOROTHIAZIDE 12.5; 1 MG/1; MG/1
TABLET ORAL
Qty: 90 TABLET | Refills: 0 | Status: SHIPPED | OUTPATIENT
Start: 2020-11-16 | End: 2021-02-25 | Stop reason: SDUPTHER

## 2021-02-25 ENCOUNTER — OFFICE VISIT (OUTPATIENT)
Dept: INTERNAL MEDICINE CLINIC | Age: 66
End: 2021-02-25

## 2021-02-25 VITALS
HEART RATE: 70 BPM | BODY MASS INDEX: 30.07 KG/M2 | TEMPERATURE: 97.2 F | HEIGHT: 72 IN | SYSTOLIC BLOOD PRESSURE: 130 MMHG | RESPIRATION RATE: 18 BRPM | WEIGHT: 222 LBS | DIASTOLIC BLOOD PRESSURE: 75 MMHG

## 2021-02-25 DIAGNOSIS — E11.9 DIET-CONTROLLED DIABETES MELLITUS (HCC): ICD-10-CM

## 2021-02-25 DIAGNOSIS — E78.2 MIXED HYPERLIPIDEMIA: ICD-10-CM

## 2021-02-25 DIAGNOSIS — Z86.79 H/O PRINZMETAL ANGINA: ICD-10-CM

## 2021-02-25 DIAGNOSIS — Z00.00 ROUTINE GENERAL MEDICAL EXAMINATION AT A HEALTH CARE FACILITY: ICD-10-CM

## 2021-02-25 DIAGNOSIS — Z00.00 MEDICARE ANNUAL WELLNESS VISIT, SUBSEQUENT: Primary | ICD-10-CM

## 2021-02-25 DIAGNOSIS — I10 ESSENTIAL HYPERTENSION: ICD-10-CM

## 2021-02-25 DIAGNOSIS — F41.9 ANXIETY: ICD-10-CM

## 2021-02-25 DIAGNOSIS — Z00.00 MEDICARE ANNUAL WELLNESS VISIT, SUBSEQUENT: ICD-10-CM

## 2021-02-25 LAB
BILIRUBIN, POC: NORMAL
BLOOD URINE, POC: NORMAL
CLARITY, POC: NORMAL
COLOR, POC: NORMAL
GLUCOSE URINE, POC: NORMAL
KETONES, POC: NORMAL
LEUKOCYTE EST, POC: NORMAL
NITRITE, POC: NORMAL
PH, POC: NORMAL
PROTEIN, POC: NORMAL
SPECIFIC GRAVITY, POC: NORMAL
UROBILINOGEN, POC: NORMAL

## 2021-02-25 PROCEDURE — 4040F PNEUMOC VAC/ADMIN/RCVD: CPT | Performed by: INTERNAL MEDICINE

## 2021-02-25 PROCEDURE — G8484 FLU IMMUNIZE NO ADMIN: HCPCS | Performed by: INTERNAL MEDICINE

## 2021-02-25 PROCEDURE — G0439 PPPS, SUBSEQ VISIT: HCPCS | Performed by: INTERNAL MEDICINE

## 2021-02-25 PROCEDURE — 3046F HEMOGLOBIN A1C LEVEL >9.0%: CPT | Performed by: INTERNAL MEDICINE

## 2021-02-25 PROCEDURE — 3017F COLORECTAL CA SCREEN DOC REV: CPT | Performed by: INTERNAL MEDICINE

## 2021-02-25 PROCEDURE — 1123F ACP DISCUSS/DSCN MKR DOCD: CPT | Performed by: INTERNAL MEDICINE

## 2021-02-25 PROCEDURE — 81002 URINALYSIS NONAUTO W/O SCOPE: CPT | Performed by: INTERNAL MEDICINE

## 2021-02-25 RX ORDER — LOSARTAN POTASSIUM AND HYDROCHLOROTHIAZIDE 12.5; 1 MG/1; MG/1
TABLET ORAL
Qty: 90 TABLET | Refills: 2 | Status: SHIPPED | OUTPATIENT
Start: 2021-02-25 | End: 2021-08-17 | Stop reason: SDUPTHER

## 2021-02-25 RX ORDER — ALPRAZOLAM 1 MG/1
TABLET ORAL
Qty: 30 TABLET | Refills: 2 | Status: SHIPPED | OUTPATIENT
Start: 2021-02-25 | End: 2021-11-04

## 2021-02-25 RX ORDER — METOPROLOL SUCCINATE 100 MG/1
TABLET, EXTENDED RELEASE ORAL
Qty: 90 TABLET | Refills: 2 | Status: SHIPPED | OUTPATIENT
Start: 2021-02-25 | End: 2021-07-27

## 2021-02-25 RX ORDER — LANSOPRAZOLE 30 MG/1
30 CAPSULE, DELAYED RELEASE ORAL DAILY
Qty: 90 CAPSULE | Refills: 1 | Status: SHIPPED | OUTPATIENT
Start: 2021-02-25 | End: 2021-08-17 | Stop reason: SDUPTHER

## 2021-02-25 RX ORDER — ESCITALOPRAM OXALATE 10 MG/1
10 TABLET ORAL DAILY
Qty: 90 TABLET | Refills: 1 | Status: SHIPPED | OUTPATIENT
Start: 2021-02-25 | End: 2021-08-17 | Stop reason: SDUPTHER

## 2021-02-25 ASSESSMENT — PATIENT HEALTH QUESTIONNAIRE - PHQ9
2. FEELING DOWN, DEPRESSED OR HOPELESS: 0
1. LITTLE INTEREST OR PLEASURE IN DOING THINGS: 0
SUM OF ALL RESPONSES TO PHQ QUESTIONS 1-9: 1
1. LITTLE INTEREST OR PLEASURE IN DOING THINGS: 1
SUM OF ALL RESPONSES TO PHQ9 QUESTIONS 1 & 2: 1
SUM OF ALL RESPONSES TO PHQ9 QUESTIONS 1 & 2: 1
SUM OF ALL RESPONSES TO PHQ QUESTIONS 1-9: 1

## 2021-02-25 ASSESSMENT — LIFESTYLE VARIABLES: HOW OFTEN DO YOU HAVE A DRINK CONTAINING ALCOHOL: 0

## 2021-02-25 NOTE — PATIENT INSTRUCTIONS
Personalized Preventive Plan for [place] - 2/25/2021  Medicare offers a range of preventive health benefits. Some of the tests and screenings are paid in full while other may be subject to a deductible, co-insurance, and/or copay. Some of these benefits include a comprehensive review of your medical history including lifestyle, illnesses that may run in your family, and various assessments and screenings as appropriate. After reviewing your medical record and screening and assessments performed today your provider may have ordered immunizations, labs, imaging, and/or referrals for you. A list of these orders (if applicable) as well as your Preventive Care list are included within your After Visit Summary for your review. Other Preventive Recommendations:    · A preventive eye exam performed by an eye specialist is recommended every 1-2 years to screen for glaucoma; cataracts, macular degeneration, and other eye disorders. · A preventive dental visit is recommended every 6 months. · Try to get at least 150 minutes of exercise per week or 10,000 steps per day on a pedometer . · Order or download the FREE \"Exercise & Physical Activity: Your Everyday Guide\" from [employer]. Call 1-143.682.8935 or search [employer] online. · You need 9167-8799 mg of calcium and 2380-7267 IU of vitamin D per day. It is possible to meet your calcium requirement with diet alone, but a vitamin D supplement is usually necessary to meet this goal.  · When exposed to the sun, use a sunscreen that protects against both UVA and UVB radiation with an SPF of 30 or greater. Reapply every 2 to 3 hours or after sweating, drying off with a towel, or swimming. · Always wear a seat belt when traveling in a car. Always wear a helmet when riding a bicycle or motorcycle.

## 2021-02-25 NOTE — PROGRESS NOTES
Medicare Annual Wellness Visit  Name: Moody Lovett Date: 2021   MRN: [id number] Sex: Male   Age: 77 y.o. Ethnicity: Non-/Non    : 1955 Race: Daljit Heath is here for Medicare AWV    Screenings for behavioral, psychosocial and functional/safety risks, and cognitive dysfunction are all negative except as indicated below. These results, as well as other patient data from the [address] form, are documented in Flowsheets linked to this Encounter. 77 y.o. . Male with known h.o HTN, Hyperlipidemia, anxiety here for annual exam       Since last time, pt has been compliant with meds and reports intermittent anxiety attacks as well as chest pains, resolves with prn xanax    Gained wt with pandemic but realized that he needs to lose about few months ago, started diet and exercising daily with treadmill and lifting wts  Now lost about 23 lbs   Feels energetic     No new health changes         DM- 2-  With A1c of 6.7. Started on diet and exercise. Did not tolerate metformin with dizzy and perspirations . , improved A1c at 6.5  Not checking sugars   No tingling numbness in feet  No blurry vision   Has not seen EYE MD yet      HTn - compliant with meds. On metoprolol, hyzaar- off norvasc for itching  No new dizziness or sob or pedal edema    Hyperlipidemia - on fenofibrate and unable to tolerate statins,  on fish oil , fenofibrate for low HDL -  developed skin rash and stopped fenofibrate         Anxiety on xanax BID prn using only as needed for chest pains which helps. Cut down from TID     Does not smoke or chew anymore    Lives with wife  Independent of ADL and IADL         Allergies   Allergen Reactions    Metformin And Related     Statins      Muscle cramps  Muscle cramps         Prior to Visit Medications    Medication Sig Taking?  Authorizing Provider   losartan-hydroCHLOROthiazide (HYZAAR) 100-12.5 MG per tablet Take 1 tablet by mouth once daily Yes Rachid Ragland MD   lansoprazole (PREVACID) 30 MG delayed release capsule Take 1 capsule by mouth daily Yes Rachid Ragland MD   metoprolol succinate (TOPROL XL) 100 MG extended release tablet Take 1 tablet by mouth once daily Yes Rachid Ragland MD   escitalopram (LEXAPRO) 10 MG tablet Take 1 tablet by mouth daily Yes Rachid Ragland MD   ALPRAZolam Titi Flynn) 1 MG tablet TAKE 1/2 (ONE-HALF) TABLET BY MOUTH NIGHTLY AS NEEDED FOR SLEEP/ANXIETY  Rachid Ragland MD   aspirin 81 MG tablet Take 1 tablet by mouth daily  Rachid Ragland MD         Past Medical History:   Diagnosis Date    Anxiety     Bursitis of elbow     right    [facility] ([facility]     Diabetes mellitus ([facility]     borderline    GERD (gastroesophageal reflux disease)     Hyperlipidemia     Hypertension     Hypertrophy of prostate without urinary obstruction and other lower urinary tract symptoms (LUTS)     Pneumonia        Past Surgical History:   Procedure Laterality Date    COLONOSCOPY  2006    hemorroids    COLONOSCOPY N/A 7/9/2019    COLONOSCOPY WITH BIOPSY performed by Polo Harmon DO at [address] COLONOSCOPY N/A 7/9/2019    COLONOSCOPY POLYPECTOMY SNARE/COLD BIOPSY performed by Polo Harmon DO at [facility], COLON, DIAGNOSTIC      THROAT SURGERY  1970's    had tumors    UPPER GASTROINTESTINAL ENDOSCOPY  11/12/2015         Family History   Problem Relation Age of Onset    Diabetes Mother     Diabetes Father     Cancer Brother         pancreatic       CareTeam (Including outside providers/suppliers regularly involved in providing care):   Patient Care Team:  Rachid Ragland MD as PCP - General  Rachid Ragland MD as PCP [facility]    Wt Readings from Last 3 Encounters:   02/25/21 222 lb (100.7 kg)   06/22/20 245 lb (111.1 kg)   03/03/20 245 lb (111.1 kg)     Vitals:    02/25/21 1258   BP: 130/75   Pulse: 70   Resp: 18   Temp: Prinzmetal angina - recurrent likely with anxiety  Always gets better with xanax 0.25 mg half dose       Anxiety- on xanax  prn , no abuse noted  Uses as needed only, oarrs being monitored    OA of right shoulder- f/w Dr. Maite Negron now s/p steroid injection   No current issues     Seasonal Allergies - on Claritin prn  Need living will  Need to see eye and dentist   Need  shingles vaccine   Pneumonia up to date      Had colonoscopy with polyp removal 2019      Patient's complete Health Risk Assessment and screening values have been reviewed and are found in [address]. The following problems were reviewed today and where indicated follow up appointments were made and/or referrals ordered. Positive Risk Factor Screenings with Interventions:            General Health and ACP:  General  In general, how would you say your health is?: Good  In the past 7 days, have you experienced any of the following?  New or Increased Pain, New or Increased Fatigue, Loneliness, Social Isolation, Stress or Anger?: (!) Stress  Do you get the social and emotional support that you need?: Yes  Do you have a Living Will?: (!) No  Advance Directives     Power of  Living Will ACP-Advance Directive ACP-Power of     Not on File Not on File Not on File Not on File      General Health Risk Interventions:  · has living will    Health Habits/Nutrition:  Health Habits/Nutrition  Do you exercise for at least 20 minutes 2-3 times per week?: Yes  Have you lost any weight without trying in the past 3 months?: No  Do you eat only one meal per day?: No  Have you seen the dentist within the past year?: N/A - wear dentures  Body mass index: (!) 30.11  Health Habits/Nutrition Interventions:  · Dental exam overdue:  patient encouraged to make appointment with his/her dentist    Hearing/Vision:  No exam data present  Hearing/Vision  Do you or your family notice any trouble with your hearing that hasn't been managed with hearing aids?: (!) Yes  Do you have difficulty driving, watching TV, or doing any of your daily activities because of your eyesight?: No  Have you had an eye exam within the past year?: (!) No  Hearing/Vision Interventions:  · Hearing concerns:  audiology referral provided    Safety:  Safety  Do you have working smoke detectors?: Yes  Have all throw rugs been removed or fastened?: (!) No  Do you have non-slip mats or surfaces in all bathtubs/showers?: (!) No  Do all of your stairways have a railing or banister?: (!) No(n/a)  Are your doorways, halls and stairs free of clutter?: Yes  Do you always fasten your seatbelt when you are in a car?: (!) No  Safety Interventions:  · Home safety tips provided     Personalized Preventive Plan   Current Health Maintenance Status  Immunization History   Administered Date(s) Administered    Influenza Virus Vaccine 02/01/2016, 10/03/2016, 11/12/2017    Influenza, High Dose (Fluzone 65 yrs and older) 10/20/2019    Influenza, High-dose, Quadv, 65 yrs +, IM (Fluzone) 09/14/2020    Influenza, Triv, 3 Years and older, IM (Afluria (5 yrs and older) 10/03/2016    Pneumococcal Polysaccharide (Brgywtpfe20) 12/04/2018        Health Maintenance   Topic Date Due    Diabetic retinal exam  08/07/1963    COVID-19 Vaccine (1 of 2) 08/07/1969    DTaP/Tdap/Td vaccine (1 - Tdap) 08/07/1972    Shingles Vaccine (1 of 2) 08/07/2003    Annual Wellness Visit (AWV)  07/09/2019    Diabetic foot exam  12/04/2019    Diabetic microalbuminuria test  12/04/2019    A1C test (Diabetic or Prediabetic)  06/22/2021    Lipid screen  06/22/2021    Potassium monitoring  06/22/2021    Creatinine monitoring  06/22/2021    Colon cancer screen colonoscopy  07/09/2029    Flu vaccine  Completed    Pneumococcal 65+ years Vaccine  Completed    Hepatitis C screen  Completed    Hepatitis A vaccine  Aged Out    Hib vaccine  Aged Out    Meningococcal (ACWY) vaccine  Aged Out     Recommendations for hdtMEDIA Due: see orders and patient instructions/AVS.  . Recommended screening schedule for the next 5-10 years is provided to the patient in written form: see Patient Instructions/AVS.    Ange Meza was seen today for medicare aw. Diagnoses and all orders for this visit:    Medicare annual wellness visit, subsequent  -     CBC WITH AUTO DIFFERENTIAL; Future  -     COMPREHENSIVE METABOLIC PANEL; Future  -     Lipid, Fasting; Future  -     TSH with Reflex; Future  -     URIC ACID; Future  -     PSA, Prostatic Specific Antigen; Future  -     POCT Urinalysis no Micro    Anxiety  -     TSH with Reflex; Future    Mixed hyperlipidemia  -     Lipid, Fasting; Future    Diet-controlled diabetes mellitus ([facility] [address]  -     HEMOGLOBIN A1C; Future  -     MICROALBUMIN / CREATININE URINE RATIO; Future    H/O Prinzmetal angina    Essential hypertension  -     COMPREHENSIVE METABOLIC PANEL; Future  -     URIC ACID; Future  -     POCT Urinalysis no Micro    Other orders  -     losartan-hydroCHLOROthiazide (HYZAAR) 100-12.5 MG per tablet; Take 1 tablet by mouth once daily  -     lansoprazole (PREVACID) 30 MG delayed release capsule; Take 1 capsule by mouth daily  -     metoprolol succinate (TOPROL XL) 100 MG extended release tablet; Take 1 tablet by mouth once daily  -     escitalopram (LEXAPRO) 10 MG tablet;  Take 1 tablet by mouth daily

## 2021-02-26 ENCOUNTER — TELEPHONE (OUTPATIENT)
Dept: INTERNAL MEDICINE CLINIC | Age: 66
End: 2021-02-26

## 2021-02-26 LAB
A/G RATIO: 1.9 (ref 1.1–2.2)
ALBUMIN SERPL-MCNC: 4.6 G/DL (ref 3.4–5)
ALP BLD-CCNC: 65 U/L (ref 40–129)
ALT SERPL-CCNC: 30 U/L (ref 10–40)
ANION GAP SERPL CALCULATED.3IONS-SCNC: 9 MMOL/L (ref 3–16)
AST SERPL-CCNC: 19 U/L (ref 15–37)
BASOPHILS ABSOLUTE: 0.1 K/UL (ref 0–0.2)
BASOPHILS RELATIVE PERCENT: 0.9 %
BILIRUB SERPL-MCNC: 0.4 MG/DL (ref 0–1)
BUN BLDV-MCNC: 16 MG/DL (ref 7–20)
CALCIUM SERPL-MCNC: 9.5 MG/DL (ref 8.3–10.6)
CHLORIDE BLD-SCNC: 99 MMOL/L (ref 99–110)
CHOLESTEROL, FASTING: 228 MG/DL (ref 0–199)
CO2: 28 MMOL/L (ref 21–32)
CREAT SERPL-MCNC: 0.8 MG/DL (ref 0.8–1.3)
CREATININE URINE: 224.3 MG/DL (ref 39–259)
EOSINOPHILS ABSOLUTE: 0.2 K/UL (ref 0–0.6)
EOSINOPHILS RELATIVE PERCENT: 2.2 %
ESTIMATED AVERAGE GLUCOSE: 306.3 MG/DL
GFR AFRICAN AMERICAN: >60
GFR NON-AFRICAN AMERICAN: >60
GLOBULIN: 2.4 G/DL
GLUCOSE BLD-MCNC: 231 MG/DL (ref 70–99)
HBA1C MFR BLD: 12.3 %
HCT VFR BLD CALC: 46.6 % (ref 40.5–52.5)
HDLC SERPL-MCNC: 31 MG/DL (ref 40–60)
HEMOGLOBIN: 16.2 G/DL (ref 13.5–17.5)
LDL CHOLESTEROL CALCULATED: 143 MG/DL
LYMPHOCYTES ABSOLUTE: 2.3 K/UL (ref 1–5.1)
LYMPHOCYTES RELATIVE PERCENT: 30.8 %
MCH RBC QN AUTO: 31.3 PG (ref 26–34)
MCHC RBC AUTO-ENTMCNC: 34.6 G/DL (ref 31–36)
MCV RBC AUTO: 90.3 FL (ref 80–100)
MICROALBUMIN UR-MCNC: 4 MG/DL
MICROALBUMIN/CREAT UR-RTO: 17.8 MG/G (ref 0–30)
MONOCYTES ABSOLUTE: 0.8 K/UL (ref 0–1.3)
MONOCYTES RELATIVE PERCENT: 10.2 %
NEUTROPHILS ABSOLUTE: 4.2 K/UL (ref 1.7–7.7)
NEUTROPHILS RELATIVE PERCENT: 55.9 %
PDW BLD-RTO: 12.3 % (ref 12.4–15.4)
PLATELET # BLD: 178 K/UL (ref 135–450)
PMV BLD AUTO: 10.3 FL (ref 5–10.5)
POTASSIUM SERPL-SCNC: 4.3 MMOL/L (ref 3.5–5.1)
PROSTATE SPECIFIC ANTIGEN: 2.24 NG/ML (ref 0–4)
RBC # BLD: 5.17 M/UL (ref 4.2–5.9)
SODIUM BLD-SCNC: 136 MMOL/L (ref 136–145)
TOTAL PROTEIN: 7 G/DL (ref 6.4–8.2)
TRIGLYCERIDE, FASTING: 269 MG/DL (ref 0–150)
TSH REFLEX: 2.51 UIU/ML (ref 0.27–4.2)
URIC ACID, SERUM: 6.1 MG/DL (ref 3.5–7.2)
VLDLC SERPL CALC-MCNC: 54 MG/DL
WBC # BLD: 7.5 K/UL (ref 4–11)

## 2021-02-26 RX ORDER — BLOOD-GLUCOSE METER
EACH MISCELLANEOUS
Qty: 1 KIT | Refills: 0 | Status: SHIPPED | OUTPATIENT
Start: 2021-02-26

## 2021-02-26 RX ORDER — BLOOD SUGAR DIAGNOSTIC
STRIP MISCELLANEOUS
Qty: 100 EACH | Refills: 3 | Status: SHIPPED | OUTPATIENT
Start: 2021-02-26 | End: 2022-03-25

## 2021-02-26 RX ORDER — GLIPIZIDE 5 MG/1
5 TABLET ORAL 2 TIMES DAILY
Qty: 60 TABLET | Refills: 2 | Status: SHIPPED | OUTPATIENT
Start: 2021-02-26 | End: 2021-03-26

## 2021-02-26 RX ORDER — LANCETS 30 GAUGE
EACH MISCELLANEOUS
Qty: 100 EACH | Refills: 3 | Status: SHIPPED | OUTPATIENT
Start: 2021-02-26

## 2021-02-26 NOTE — TELEPHONE ENCOUNTER
----- Message from Damaris Byers MD sent at 2/26/2021  3:42 PM EST -----  Yes  ----- Message -----  From: Elisabeth Arango  Sent: 2/26/2021   1:23 PM EST  To: Dmaaris Byers MD    Keep taking fish oil?  ----- Message -----  From: Damaris Byers MD  Sent: 2/26/2021  12:55 PM EST  To: [place] [address] to hold cholesterol med  Need sugar control  ----- Message -----  From: Elisabeth Arango  Sent: 2/26/2021   9:34 AM EST  To: Damaris Byers MD    Pt is currently taking fish oil- two daily. Pt has tried cholesterol med in the past but had side effects.

## 2021-02-26 NOTE — TELEPHONE ENCOUNTER
----- Message from Madina Tena MD sent at 2/26/2021  5:04 PM EST -----  These supplements should not cause high sugars  Likely genetics  ----- Message -----  From: Faustino Iniguez  Sent: 2/26/2021   1:47 PM EST  To: Madina Tena MD    Pt's wife states that pt would be willing to take cholesterol med. Also wants to know if pt taking vitamin D, vitamin, C, and Zinc could cause his sugars to be high. Also allergy/contradiction for Glucovance with Metformin and others. Please advise.

## 2021-02-26 NOTE — TELEPHONE ENCOUNTER
----- Message from Cristino Baptiste MD sent at 2/26/2021 12:55 PM EST -----  Then glipizide 5 mg bid  ----- Message -----  From: Nicola Horne  Sent: 2/26/2021   9:38 AM EST  To: Cristino Baptiste MD    You wanted to call in glucovance but pt has allergy to metformin and related. Please advise.

## 2021-02-26 NOTE — TELEPHONE ENCOUNTER
----- Message from Gregorio Orozco MD sent at 2/26/2021  3:42 PM EST -----  Yes  ----- Message -----  From: Asaf Anderson  Sent: 2/26/2021   1:23 PM EST  To: Gregorio Orozco MD    Keep taking fish oil?  ----- Message -----  From: Gregorio Orozco MD  Sent: 2/26/2021  12:55 PM EST  To: [address] [place] [address] to hold cholesterol med  Need sugar control  ----- Message -----  From: Asaf Anderson  Sent: 2/26/2021   9:34 AM EST  To: Gregorio Orozco MD    Pt is currently taking fish oil- two daily. Pt has tried cholesterol med in the past but had side effects.

## 2021-03-03 ENCOUNTER — TELEPHONE (OUTPATIENT)
Dept: INTERNAL MEDICINE CLINIC | Age: 66
End: 2021-03-03

## 2021-03-03 NOTE — TELEPHONE ENCOUNTER
----- Message from Demetri Bianchi MD sent at 3/3/2021  4:55 PM EST -----  Contact: [phone]  Stop the evening dose   ----- Message -----  From: Wilton Samples  Sent: 3/3/2021   3:26 PM EST  To: MD Dr Lizeth Morrell Pt has been taking the glipiZIDE (GLUCOTROL) 5 MG tablet and his sugar has been getting lower everyday but this morning after he took it it was 91 and he was feeling very light headed he wasn't sure if he need to take the evening pill since it is dropping his sugar to low.

## 2021-03-26 RX ORDER — GLIPIZIDE 5 MG/1
TABLET ORAL
Qty: 60 TABLET | Refills: 5 | Status: SHIPPED | OUTPATIENT
Start: 2021-03-26 | End: 2021-05-20 | Stop reason: DRUGHIGH

## 2021-04-23 ENCOUNTER — TELEPHONE (OUTPATIENT)
Dept: INTERNAL MEDICINE CLINIC | Age: 66
End: 2021-04-23

## 2021-04-23 DIAGNOSIS — J06.9 UPPER RESPIRATORY TRACT INFECTION, UNSPECIFIED TYPE: Primary | ICD-10-CM

## 2021-04-23 RX ORDER — AZITHROMYCIN 250 MG/1
TABLET, FILM COATED ORAL
Qty: 1 PACKET | Refills: 0 | Status: ON HOLD | OUTPATIENT
Start: 2021-04-23 | End: 2021-05-21

## 2021-04-23 NOTE — TELEPHONE ENCOUNTER
----- Message from Fred Foote MD sent at 4/23/2021  1:37 PM EDT -----  Contact: [phone]  Start on z pack if no allergies  Also try mucinex   See us if not better  ----- Message -----  From: Amish Leonardo  Sent: 4/23/2021  11:05 AM EDT  To: Fred Foote MD    Pt started yesterday with head and sinus congestion, pressure sore throat and slight cough no fever wanted something called in please, he has not had his Covid vacc.             [address] [phone]

## 2021-04-28 ENCOUNTER — TELEPHONE (OUTPATIENT)
Dept: INTERNAL MEDICINE CLINIC | Age: 66
End: 2021-04-28

## 2021-04-28 NOTE — TELEPHONE ENCOUNTER
----- Message from Kim Simpson MD sent at 4/28/2021  3:11 PM EDT -----  Contact: [phone]  Half and half  ----- Message -----  From: Mk Nick  Sent: 4/28/2021   1:02 PM EDT  To: Kim Simpson MD    Spouse states he currently takes glipizide 1 am and 1 pm.  Should he take 1/2 am and 1/2 pm or just one tablet daily? Please advise.   ----- Message -----  From: Kim Simpson MD  Sent: 4/28/2021  11:44 AM EDT  To: Jovita Duverney    Cut down glipizide to half  Likely he is eating good diet and now on meds dropping sugars  ----- Message -----  From: Awilda Brooks  Sent: 4/28/2021   9:51 AM EDT  To: Kim Simpson MD    Pt's sugar has been fluctuating he started yesterday morning at 119 after meds and breakfast and l that evening he dropped to 66 and that was also after he took his meds and ate dinner wanted to know if he needs to change his medication or why his sugar is fluctuating so much.     Thank you

## 2021-04-28 NOTE — TELEPHONE ENCOUNTER
----- Message from Phillip Gonzalez MD sent at 4/28/2021 11:44 AM EDT -----  Contact: [phone]  Cut down glipizide to half  Likely he is eating good diet and now on meds dropping sugars  ----- Message -----  From: Jack Arellano  Sent: 4/28/2021   9:51 AM EDT  To: Phillip Gonzalez MD    Pt's sugar has been fluctuating he started yesterday morning at 119 after meds and breakfast and l that evening he dropped to 66 and that was also after he took his meds and ate dinner wanted to know if he needs to change his medication or why his sugar is fluctuating so much.     Thank you

## 2021-05-20 ENCOUNTER — TELEPHONE (OUTPATIENT)
Dept: INTERNAL MEDICINE CLINIC | Age: 66
End: 2021-05-20

## 2021-05-20 RX ORDER — GLIPIZIDE 2.5 MG/1
2.5 TABLET, EXTENDED RELEASE ORAL DAILY
Qty: 90 TABLET | Refills: 0 | Status: SHIPPED | OUTPATIENT
Start: 2021-05-20 | End: 2021-08-17 | Stop reason: SDUPTHER

## 2021-05-21 ENCOUNTER — HOSPITAL ENCOUNTER (INPATIENT)
Age: 66
LOS: 2 days | Discharge: HOME | DRG: 149 | End: 2021-05-23
Attending: EMERGENCY MEDICINE | Admitting: INTERNAL MEDICINE
Payer: MEDICARE

## 2021-05-21 ENCOUNTER — APPOINTMENT (OUTPATIENT)
Dept: CT IMAGING | Age: 66
DRG: 149 | End: 2021-05-21
Payer: MEDICARE

## 2021-05-21 DIAGNOSIS — R11.2 INTRACTABLE VOMITING WITH NAUSEA, UNSPECIFIED VOMITING TYPE: ICD-10-CM

## 2021-05-21 DIAGNOSIS — R42 VERTIGO: Primary | ICD-10-CM

## 2021-05-21 LAB
A/G RATIO: 2.2 (ref 1.1–2.2)
ALBUMIN SERPL-MCNC: 4.6 G/DL (ref 3.4–5)
ALP BLD-CCNC: 51 U/L (ref 40–129)
ALT SERPL-CCNC: 22 U/L (ref 10–40)
ANION GAP SERPL CALCULATED.3IONS-SCNC: 12 MMOL/L (ref 3–16)
AST SERPL-CCNC: 19 U/L (ref 15–37)
BASOPHILS ABSOLUTE: 0.1 K/UL (ref 0–0.2)
BASOPHILS RELATIVE PERCENT: 0.6 %
BILIRUB SERPL-MCNC: 0.3 MG/DL (ref 0–1)
BILIRUBIN URINE: NEGATIVE
BLOOD, URINE: NEGATIVE
BUN BLDV-MCNC: 23 MG/DL (ref 7–20)
CALCIUM SERPL-MCNC: 8.9 MG/DL (ref 8.3–10.6)
CHLORIDE BLD-SCNC: 104 MMOL/L (ref 99–110)
CLARITY: CLEAR
CO2: 24 MMOL/L (ref 21–32)
COLOR: YELLOW
CREAT SERPL-MCNC: 0.8 MG/DL (ref 0.8–1.3)
EKG ATRIAL RATE: 62 BPM
EKG DIAGNOSIS: NORMAL
EKG P AXIS: 0 DEGREES
EKG P-R INTERVAL: 172 MS
EKG Q-T INTERVAL: 416 MS
EKG QRS DURATION: 100 MS
EKG QTC CALCULATION (BAZETT): 422 MS
EKG R AXIS: 21 DEGREES
EKG T AXIS: 8 DEGREES
EKG VENTRICULAR RATE: 62 BPM
EOSINOPHILS ABSOLUTE: 0.2 K/UL (ref 0–0.6)
EOSINOPHILS RELATIVE PERCENT: 2.1 %
GFR AFRICAN AMERICAN: >60
GFR NON-AFRICAN AMERICAN: >60
GLOBULIN: 2.1 G/DL
GLUCOSE BLD-MCNC: 106 MG/DL (ref 70–99)
GLUCOSE BLD-MCNC: 134 MG/DL (ref 70–99)
GLUCOSE BLD-MCNC: 151 MG/DL (ref 70–99)
GLUCOSE BLD-MCNC: 154 MG/DL (ref 70–99)
GLUCOSE BLD-MCNC: 156 MG/DL (ref 70–99)
GLUCOSE URINE: NEGATIVE MG/DL
HCT VFR BLD CALC: 43.4 % (ref 40.5–52.5)
HEMOGLOBIN: 14.9 G/DL (ref 13.5–17.5)
KETONES, URINE: NEGATIVE MG/DL
LEUKOCYTE ESTERASE, URINE: NEGATIVE
LIPASE: 28 U/L (ref 13–60)
LYMPHOCYTES ABSOLUTE: 2.1 K/UL (ref 1–5.1)
LYMPHOCYTES RELATIVE PERCENT: 23.3 %
MCH RBC QN AUTO: 31 PG (ref 26–34)
MCHC RBC AUTO-ENTMCNC: 34.2 G/DL (ref 31–36)
MCV RBC AUTO: 90.6 FL (ref 80–100)
MICROSCOPIC EXAMINATION: NORMAL
MONOCYTES ABSOLUTE: 0.7 K/UL (ref 0–1.3)
MONOCYTES RELATIVE PERCENT: 8.2 %
NEUTROPHILS ABSOLUTE: 5.9 K/UL (ref 1.7–7.7)
NEUTROPHILS RELATIVE PERCENT: 65.8 %
NITRITE, URINE: NEGATIVE
PDW BLD-RTO: 12.8 % (ref 12.4–15.4)
PERFORMED ON: ABNORMAL
PH UA: 5.5 (ref 5–8)
PLATELET # BLD: 149 K/UL (ref 135–450)
PMV BLD AUTO: 9.1 FL (ref 5–10.5)
POTASSIUM SERPL-SCNC: 4 MMOL/L (ref 3.5–5.1)
PROTEIN UA: NEGATIVE MG/DL
RBC # BLD: 4.8 M/UL (ref 4.2–5.9)
SARS-COV-2, NAAT: NOT DETECTED
SODIUM BLD-SCNC: 140 MMOL/L (ref 136–145)
SPECIFIC GRAVITY UA: 1.02 (ref 1–1.03)
TOTAL PROTEIN: 6.7 G/DL (ref 6.4–8.2)
URINE REFLEX TO CULTURE: NORMAL
URINE TYPE: NORMAL
UROBILINOGEN, URINE: 0.2 E.U./DL
WBC # BLD: 8.9 K/UL (ref 4–11)

## 2021-05-21 PROCEDURE — 36415 COLL VENOUS BLD VENIPUNCTURE: CPT

## 2021-05-21 PROCEDURE — 80053 COMPREHEN METABOLIC PANEL: CPT

## 2021-05-21 PROCEDURE — 99221 1ST HOSP IP/OBS SF/LOW 40: CPT | Performed by: NURSE PRACTITIONER

## 2021-05-21 PROCEDURE — 87635 SARS-COV-2 COVID-19 AMP PRB: CPT

## 2021-05-21 PROCEDURE — 70450 CT HEAD/BRAIN W/O DYE: CPT

## 2021-05-21 PROCEDURE — 1200000000 HC SEMI PRIVATE

## 2021-05-21 PROCEDURE — 6370000000 HC RX 637 (ALT 250 FOR IP): Performed by: EMERGENCY MEDICINE

## 2021-05-21 PROCEDURE — 99283 EMERGENCY DEPT VISIT LOW MDM: CPT

## 2021-05-21 PROCEDURE — 96374 THER/PROPH/DIAG INJ IV PUSH: CPT

## 2021-05-21 PROCEDURE — 93005 ELECTROCARDIOGRAM TRACING: CPT | Performed by: EMERGENCY MEDICINE

## 2021-05-21 PROCEDURE — 83036 HEMOGLOBIN GLYCOSYLATED A1C: CPT

## 2021-05-21 PROCEDURE — 6370000000 HC RX 637 (ALT 250 FOR IP): Performed by: PHYSICIAN ASSISTANT

## 2021-05-21 PROCEDURE — 96375 TX/PRO/DX INJ NEW DRUG ADDON: CPT

## 2021-05-21 PROCEDURE — 2580000003 HC RX 258: Performed by: PHYSICIAN ASSISTANT

## 2021-05-21 PROCEDURE — 6360000002 HC RX W HCPCS: Performed by: PHYSICIAN ASSISTANT

## 2021-05-21 PROCEDURE — 81003 URINALYSIS AUTO W/O SCOPE: CPT

## 2021-05-21 PROCEDURE — 85025 COMPLETE CBC W/AUTO DIFF WBC: CPT

## 2021-05-21 PROCEDURE — 83690 ASSAY OF LIPASE: CPT

## 2021-05-21 PROCEDURE — 6360000002 HC RX W HCPCS: Performed by: EMERGENCY MEDICINE

## 2021-05-21 PROCEDURE — G1010 CDSM STANSON: HCPCS

## 2021-05-21 PROCEDURE — 93010 ELECTROCARDIOGRAM REPORT: CPT | Performed by: INTERNAL MEDICINE

## 2021-05-21 PROCEDURE — 6360000004 HC RX CONTRAST MEDICATION: Performed by: EMERGENCY MEDICINE

## 2021-05-21 RX ORDER — LOSARTAN POTASSIUM 100 MG/1
100 TABLET ORAL DAILY
Status: DISCONTINUED | OUTPATIENT
Start: 2021-05-21 | End: 2021-05-21

## 2021-05-21 RX ORDER — METOCLOPRAMIDE HYDROCHLORIDE 5 MG/ML
10 INJECTION INTRAMUSCULAR; INTRAVENOUS ONCE
Status: COMPLETED | OUTPATIENT
Start: 2021-05-21 | End: 2021-05-21

## 2021-05-21 RX ORDER — SCOLOPAMINE TRANSDERMAL SYSTEM 1 MG/1
1 PATCH, EXTENDED RELEASE TRANSDERMAL
Status: DISCONTINUED | OUTPATIENT
Start: 2021-05-21 | End: 2021-05-23 | Stop reason: HOSPADM

## 2021-05-21 RX ORDER — ESCITALOPRAM OXALATE 10 MG/1
10 TABLET ORAL DAILY
Status: DISCONTINUED | OUTPATIENT
Start: 2021-05-21 | End: 2021-05-23 | Stop reason: HOSPADM

## 2021-05-21 RX ORDER — SODIUM CHLORIDE 0.9 % (FLUSH) 0.9 %
5-40 SYRINGE (ML) INJECTION PRN
Status: DISCONTINUED | OUTPATIENT
Start: 2021-05-21 | End: 2021-05-23 | Stop reason: HOSPADM

## 2021-05-21 RX ORDER — SODIUM CHLORIDE 9 MG/ML
25 INJECTION, SOLUTION INTRAVENOUS PRN
Status: DISCONTINUED | OUTPATIENT
Start: 2021-05-21 | End: 2021-05-23 | Stop reason: HOSPADM

## 2021-05-21 RX ORDER — ONDANSETRON 2 MG/ML
4 INJECTION INTRAMUSCULAR; INTRAVENOUS EVERY 6 HOURS PRN
Status: DISCONTINUED | OUTPATIENT
Start: 2021-05-21 | End: 2021-05-23 | Stop reason: HOSPADM

## 2021-05-21 RX ORDER — PROMETHAZINE HYDROCHLORIDE 25 MG/ML
12.5 INJECTION, SOLUTION INTRAMUSCULAR; INTRAVENOUS ONCE
Status: COMPLETED | OUTPATIENT
Start: 2021-05-21 | End: 2021-05-21

## 2021-05-21 RX ORDER — POTASSIUM CHLORIDE 7.45 MG/ML
10 INJECTION INTRAVENOUS PRN
Status: DISCONTINUED | OUTPATIENT
Start: 2021-05-21 | End: 2021-05-23 | Stop reason: HOSPADM

## 2021-05-21 RX ORDER — PANTOPRAZOLE SODIUM 40 MG/1
40 TABLET, DELAYED RELEASE ORAL
Status: DISCONTINUED | OUTPATIENT
Start: 2021-05-22 | End: 2021-05-23 | Stop reason: HOSPADM

## 2021-05-21 RX ORDER — SODIUM CHLORIDE 9 MG/ML
INJECTION, SOLUTION INTRAVENOUS CONTINUOUS
Status: DISCONTINUED | OUTPATIENT
Start: 2021-05-21 | End: 2021-05-23

## 2021-05-21 RX ORDER — ALPRAZOLAM 0.5 MG/1
0.5 TABLET ORAL NIGHTLY PRN
Status: DISCONTINUED | OUTPATIENT
Start: 2021-05-21 | End: 2021-05-23 | Stop reason: HOSPADM

## 2021-05-21 RX ORDER — MAGNESIUM SULFATE IN WATER 40 MG/ML
2000 INJECTION, SOLUTION INTRAVENOUS PRN
Status: DISCONTINUED | OUTPATIENT
Start: 2021-05-21 | End: 2021-05-23 | Stop reason: HOSPADM

## 2021-05-21 RX ORDER — POLYETHYLENE GLYCOL 3350 17 G/17G
17 POWDER, FOR SOLUTION ORAL DAILY PRN
Status: DISCONTINUED | OUTPATIENT
Start: 2021-05-21 | End: 2021-05-23 | Stop reason: HOSPADM

## 2021-05-21 RX ORDER — ONDANSETRON 2 MG/ML
4 INJECTION INTRAMUSCULAR; INTRAVENOUS ONCE
Status: COMPLETED | OUTPATIENT
Start: 2021-05-21 | End: 2021-05-21

## 2021-05-21 RX ORDER — MECLIZINE HYDROCHLORIDE CHEWABLE TABLETS 25 MG/1
25 TABLET, CHEWABLE ORAL ONCE
Status: COMPLETED | OUTPATIENT
Start: 2021-05-21 | End: 2021-05-21

## 2021-05-21 RX ORDER — HYDROCHLOROTHIAZIDE 25 MG/1
12.5 TABLET ORAL DAILY
Status: DISCONTINUED | OUTPATIENT
Start: 2021-05-21 | End: 2021-05-21

## 2021-05-21 RX ORDER — ASPIRIN 81 MG/1
81 TABLET, CHEWABLE ORAL DAILY
Status: DISCONTINUED | OUTPATIENT
Start: 2021-05-21 | End: 2021-05-23 | Stop reason: HOSPADM

## 2021-05-21 RX ORDER — POTASSIUM CHLORIDE 20 MEQ/1
40 TABLET, EXTENDED RELEASE ORAL PRN
Status: DISCONTINUED | OUTPATIENT
Start: 2021-05-21 | End: 2021-05-23 | Stop reason: HOSPADM

## 2021-05-21 RX ORDER — LOSARTAN POTASSIUM 100 MG/1
100 TABLET ORAL DAILY
Status: DISCONTINUED | OUTPATIENT
Start: 2021-05-21 | End: 2021-05-22

## 2021-05-21 RX ORDER — METOPROLOL SUCCINATE 50 MG/1
100 TABLET, EXTENDED RELEASE ORAL DAILY
Status: DISCONTINUED | OUTPATIENT
Start: 2021-05-21 | End: 2021-05-23 | Stop reason: HOSPADM

## 2021-05-21 RX ORDER — ACETAMINOPHEN 325 MG/1
650 TABLET ORAL EVERY 6 HOURS PRN
Status: DISCONTINUED | OUTPATIENT
Start: 2021-05-21 | End: 2021-05-23 | Stop reason: HOSPADM

## 2021-05-21 RX ORDER — ACETAMINOPHEN 650 MG/1
650 SUPPOSITORY RECTAL EVERY 6 HOURS PRN
Status: DISCONTINUED | OUTPATIENT
Start: 2021-05-21 | End: 2021-05-23 | Stop reason: HOSPADM

## 2021-05-21 RX ORDER — DIPHENHYDRAMINE HYDROCHLORIDE 50 MG/ML
50 INJECTION INTRAMUSCULAR; INTRAVENOUS ONCE
Status: COMPLETED | OUTPATIENT
Start: 2021-05-21 | End: 2021-05-21

## 2021-05-21 RX ORDER — MECLIZINE HYDROCHLORIDE 25 MG/1
25 TABLET ORAL 3 TIMES DAILY PRN
Status: DISCONTINUED | OUTPATIENT
Start: 2021-05-21 | End: 2021-05-23 | Stop reason: HOSPADM

## 2021-05-21 RX ORDER — SODIUM CHLORIDE 0.9 % (FLUSH) 0.9 %
5-40 SYRINGE (ML) INJECTION EVERY 12 HOURS SCHEDULED
Status: DISCONTINUED | OUTPATIENT
Start: 2021-05-21 | End: 2021-05-23 | Stop reason: HOSPADM

## 2021-05-21 RX ORDER — LOSARTAN POTASSIUM AND HYDROCHLOROTHIAZIDE 12.5; 1 MG/1; MG/1
1 TABLET ORAL DAILY
Status: DISCONTINUED | OUTPATIENT
Start: 2021-05-21 | End: 2021-05-21

## 2021-05-21 RX ORDER — HYDROCHLOROTHIAZIDE 25 MG/1
12.5 TABLET ORAL DAILY
Status: DISCONTINUED | OUTPATIENT
Start: 2021-05-21 | End: 2021-05-22

## 2021-05-21 RX ORDER — PROMETHAZINE HYDROCHLORIDE 25 MG/ML
12.5 INJECTION, SOLUTION INTRAMUSCULAR; INTRAVENOUS EVERY 6 HOURS PRN
Status: DISCONTINUED | OUTPATIENT
Start: 2021-05-21 | End: 2021-05-23 | Stop reason: HOSPADM

## 2021-05-21 RX ADMIN — PROMETHAZINE HYDROCHLORIDE 12.5 MG: 25 INJECTION INTRAMUSCULAR; INTRAVENOUS at 08:30

## 2021-05-21 RX ADMIN — IOPAMIDOL 75 ML: 755 INJECTION, SOLUTION INTRAVENOUS at 07:00

## 2021-05-21 RX ADMIN — METOCLOPRAMIDE 10 MG: 5 INJECTION, SOLUTION INTRAMUSCULAR; INTRAVENOUS at 10:02

## 2021-05-21 RX ADMIN — ONDANSETRON HYDROCHLORIDE 4 MG: 2 INJECTION, SOLUTION INTRAVENOUS at 06:31

## 2021-05-21 RX ADMIN — ASPIRIN 81 MG: 81 TABLET, CHEWABLE ORAL at 12:29

## 2021-05-21 RX ADMIN — METOPROLOL SUCCINATE 100 MG: 50 TABLET, EXTENDED RELEASE ORAL at 12:28

## 2021-05-21 RX ADMIN — ENOXAPARIN SODIUM 40 MG: 40 INJECTION SUBCUTANEOUS at 12:36

## 2021-05-21 RX ADMIN — ESCITALOPRAM OXALATE 10 MG: 10 TABLET ORAL at 12:29

## 2021-05-21 RX ADMIN — DIPHENHYDRAMINE HYDROCHLORIDE 50 MG: 50 INJECTION INTRAMUSCULAR; INTRAVENOUS at 10:02

## 2021-05-21 RX ADMIN — SODIUM CHLORIDE: 9 INJECTION, SOLUTION INTRAVENOUS at 12:27

## 2021-05-21 RX ADMIN — LOSARTAN POTASSIUM 100 MG: 100 TABLET, FILM COATED ORAL at 12:36

## 2021-05-21 RX ADMIN — HYDROCHLOROTHIAZIDE 12.5 MG: 25 TABLET ORAL at 12:28

## 2021-05-21 RX ADMIN — ALPRAZOLAM 0.5 MG: 0.5 TABLET ORAL at 22:42

## 2021-05-21 RX ADMIN — SODIUM CHLORIDE: 9 INJECTION, SOLUTION INTRAVENOUS at 22:38

## 2021-05-21 RX ADMIN — MECLIZINE HYDROCHLORIDE 25 MG: 25 TABLET ORAL at 22:42

## 2021-05-21 RX ADMIN — MECLIZINE HCL 25 MG: 25 TABLET, CHEWABLE ORAL at 06:30

## 2021-05-21 ASSESSMENT — ENCOUNTER SYMPTOMS
EYE DISCHARGE: 0
COUGH: 0
SORE THROAT: 0
DIARRHEA: 0
ABDOMINAL PAIN: 0
NAUSEA: 1
VOMITING: 1
BACK PAIN: 0

## 2021-05-21 NOTE — PROGRESS NOTES
Pt. Transferred from Er to room 218 via wheelchair. Upon standing, pt. C/o dizziness and unsteady gait upon transfer with leaning noted to the left. Pt. Also c/o \"left leg twitching at times\". Upon sitting in wheelchair, pt. With immediate nausea followed by approximate 30cc emesis. Pt. Stated, \"my head feels full and my hearing is less on the left\". Update given to shift Lucia GORDON Covert on receiving unit.  Jarrod Alvarado Clinical

## 2021-05-21 NOTE — PROGRESS NOTES
Physical Therapy  RN states patient is sleeping currently. Will attempt assessment 5/22. This weekend, no vestibular assessment is available. After discussion information presented in chart with [facility], who does practice this therapy technique, she discussed possibly alternative dx  Shared with RN, to share  with MD if patient does not have positional nystagmus (labrynthiitis or Vestibular neuritis). Plan to follow up in the morning.   Tk Hare, PT #[id number]

## 2021-05-21 NOTE — ED NOTES
To CT scan via cart and EKG done just before.      Yogi Santos, [facility]  05/21/21 2620

## 2021-05-21 NOTE — PROGRESS NOTES
Occupational Therapy  Orders received, chart reviewed. Will await workup/H&P prior to evaluation.   Ibeth Shah, OTR/L [id number]

## 2021-05-21 NOTE — ED PROVIDER NOTES
[address]        Pt Name: Taryn Goodson  MRN: [id number]  [place] 1955  Date of evaluation: 5/21/2021  Provider: Truong Leroy MD  PCP: Anita Hough MD  ED Attending: Truong Leroy MD    [address] [place] [address]       Chief Complaint   Patient presents with    Emesis       HISTORY OF PRESENT ILLNESS   (Location/Symptom, Timing/Onset, Context/Setting, Quality, Duration, Modifying Factors, Severity)  Note limiting factors. Taryn Goodson is a 77 y.o. male who presents with nausea, vomiting, dizziness since waking up at 330am.  Patient states that movement makes his dizziness worse. Eating a donut stick made it better. Patient's blood sugar was normal.  Patient denies pain. No other associated symptoms. He has not had symptoms like this previously. History is obtained from the patient. REVIEW OF SYSTEMS    (2-9 systems for level 4, 10 or more for level 5)     Review of Systems   Constitutional: Negative for chills and fever. HENT: Negative for congestion and sore throat. Eyes: Negative for discharge. Respiratory: Negative for cough. Cardiovascular: Negative for chest pain. Gastrointestinal: Positive for nausea and vomiting. Negative for abdominal pain and diarrhea. Endocrine: Negative for polydipsia. Genitourinary: Negative for dysuria and urgency. Musculoskeletal: Negative for back pain, myalgias, neck pain and neck stiffness. Skin: Negative for rash. Neurological: Positive for dizziness and light-headedness. Negative for weakness and headaches. Hematological: Does not bruise/bleed easily. Psychiatric/Behavioral: Negative for confusion. Positives and Pertinent negatives as per HPI. Except as noted above in the ROS, all other systems were reviewed and negative.        PAST MEDICAL HISTORY     Past Medical History:   Diagnosis Date    Anxiety     Bursitis of elbow     right    Coronary artery spasm Refills: 1      ALPRAZolam (XANAX) 1 MG tablet TAKE 1/2 (ONE-HALF) TABLET BY MOUTH NIGHTLY AS NEEDED FOR SLEEP/ANXIETY  Qty: 30 tablet, Refills: 2    Associated Diagnoses: Anxiety      aspirin 81 MG tablet Take 1 tablet by mouth daily  Qty: 30 tablet, Refills: 0    Associated Diagnoses: Diet-controlled diabetes mellitus ([address] Essential hypertension               ALLERGIES     Metformin and related and Statins    FAMILYHISTORY       Family History   Problem Relation Age of Onset    Diabetes Mother     Diabetes Father     Cancer Brother         pancreatic          SOCIAL HISTORY       Social History     Socioeconomic History    Marital status:      Spouse name: None    Number of children: None    Years of education: None    Highest education level: None   Occupational History    None   Tobacco Use    Smoking status: Never Smoker    Smokeless tobacco: Former User    Tobacco comment: Quit 5 years ago-smokeless tobacco   Substance and Sexual Activity    Alcohol use: No     Alcohol/week: 0.0 standard drinks     Comment: quit     Drug use: No    Sexual activity: None   Other Topics Concern    None   Social History Narrative    None     Social Determinants of Health     Financial Resource Strain:     Difficulty of Paying Living Expenses:    Food Insecurity:     Worried About Running Out of Food in the Last Year:     Ran Out of Food in the Last Year:    Transportation Needs:     Lack of Transportation (Medical):      Lack of Transportation (Non-Medical):    Physical Activity:     Days of Exercise per Week:     Minutes of Exercise per Session:    Stress:     Feeling of Stress :    Social Connections:     Frequency of Communication with Friends and Family:     Frequency of Social Gatherings with Friends and Family:     Attends Yarsanism Services:     Active Member of Clubs or Organizations:     Attends Club or Organization Meetings:     Marital Status:    Intimate Partner Violence:     Fear of Current or Ex-Partner:     Emotionally Abused:     Physically Abused:     Sexually Abused:        SCREENINGS   NIH Stroke Scale  NIH Stroke Scale Assessed: Yes  Interval: Baseline  Level of Consciousness (1a. ): Alert  LOC Questions (1b. ): Answers both correctly  LOC Commands (1c. ): Performs both tasks correctly  Best Gaze (2. ): Normal  Visual (3. ): No visual loss  Facial Palsy (4. ): Normal symmetrical movement  Motor Arm, Left (5a. ): No drift  Motor Arm, Right (5b. ): No drift  Motor Leg, Left (6a. ): No drift  Motor Leg, Right (6b. ): No drift  Limb Ataxia (7. ): Absent  Sensory (8. ): Normal  Best Language (9. ): No aphasia  Dysarthria (10. ): Normal  Extinction and Inattention (11): No abnormality  Total: 0Glasgow Coma Scale  Eye Opening: Spontaneous  Best Verbal Response: Oriented  Best Motor Response: Obeys commands  Mona Coma Scale Score: 15        PHYSICAL EXAM    (up to 7 for level 4, 8 or more for level 5)     ED Triage Vitals [05/21/21 0547]   BP Temp Temp src Pulse Resp SpO2 Height Weight   (!) 174/88 98 °F (36.7 °C) -- 70 17 100 % 6' (1.829 m) 220 lb (99.8 kg)       Physical Exam  Vitals and nursing note reviewed. Constitutional:       General: He is not in acute distress. Appearance: Normal appearance. He is well-developed and overweight. HENT:      Head: Normocephalic and atraumatic. Right Ear: External ear normal.      Left Ear: External ear normal.      Nose: Nose normal.      Mouth/Throat:      Pharynx: No oropharyngeal exudate. Eyes:      Conjunctiva/sclera: Conjunctivae normal.      Pupils: Pupils are equal, round, and reactive to light. Cardiovascular:      Rate and Rhythm: Normal rate and regular rhythm. Heart sounds: Normal heart sounds. No murmur heard. No friction rub. No gallop. Pulmonary:      Effort: Pulmonary effort is normal. No respiratory distress. Breath sounds: Normal breath sounds. No wheezing.    Abdominal:      General: Bowel sounds are normal. There is no distension. Palpations: Abdomen is soft. Tenderness: There is no abdominal tenderness. There is no guarding or rebound. Musculoskeletal:         General: No tenderness. Normal range of motion. Cervical back: Normal range of motion and neck supple. Lymphadenopathy:      Cervical: No cervical adenopathy. Skin:     General: Skin is warm and dry. Findings: No rash. Neurological:      Mental Status: He is alert and oriented to person, place, and time. Cranial Nerves: No cranial nerve deficit. Comments: Negative test of skew. Please see NIHSS elsewhere. Psychiatric:         Mood and Affect: Mood normal.         Behavior: Behavior is cooperative.          DIAGNOSTIC RESULTS   LABS:    Results for orders placed or performed during the hospital encounter of 05/21/21   COVID-19, Rapid   Result Value Ref Range    SARS-CoV-2, NAAT Not Detected Not Detected   CBC Auto Differential   Result Value Ref Range    WBC 8.9 4.0 - 11.0 K/uL    RBC 4.80 4.20 - 5.90 M/uL    Hemoglobin 14.9 13.5 - 17.5 g/dL    Hematocrit 43.4 40.5 - 52.5 %    MCV 90.6 80.0 - 100.0 fL    MCH 31.0 26.0 - 34.0 pg    MCHC 34.2 31.0 - 36.0 g/dL    RDW 12.8 12.4 - 15.4 %    Platelets 166 549 - 596 K/uL    MPV 9.1 5.0 - 10.5 fL    Neutrophils % 65.8 %    Lymphocytes % 23.3 %    Monocytes % 8.2 %    Eosinophils % 2.1 %    Basophils % 0.6 %    Neutrophils Absolute 5.9 1.7 - 7.7 K/uL    Lymphocytes Absolute 2.1 1.0 - 5.1 K/uL    Monocytes Absolute 0.7 0.0 - 1.3 K/uL    Eosinophils Absolute 0.2 0.0 - 0.6 K/uL    Basophils Absolute 0.1 0.0 - 0.2 K/uL   Comprehensive Metabolic Panel   Result Value Ref Range    Sodium 140 136 - 145 mmol/L    Potassium 4.0 3.5 - 5.1 mmol/L    Chloride 104 99 - 110 mmol/L    CO2 24 21 - 32 mmol/L    Anion Gap 12 3 - 16    Glucose 154 (H) 70 - 99 mg/dL    BUN 23 (H) 7 - 20 mg/dL    CREATININE 0.8 0.8 - 1.3 mg/dL    GFR Non-African American >60 >60    GFR  CO2 27 21 - 32 mmol/L    Anion Gap 10 3 - 16    Glucose 115 (H) 70 - 99 mg/dL    BUN 16 7 - 20 mg/dL    CREATININE 0.7 (L) 0.8 - 1.3 mg/dL    GFR Non-African American >60 >60    GFR African American >60 >60    Calcium 8.6 8.3 - 10.6 mg/dL    Total Protein 6.2 (L) 6.4 - 8.2 g/dL    Albumin 4.0 3.4 - 5.0 g/dL    Albumin/Globulin Ratio 1.8 1.1 - 2.2    Total Bilirubin 0.5 0.0 - 1.0 mg/dL    Alkaline Phosphatase 47 40 - 129 U/L    ALT 17 10 - 40 U/L    AST 14 (L) 15 - 37 U/L    Globulin 2.2 g/dL   POCT Glucose   Result Value Ref Range    POC Glucose 151 (H) 70 - 99 mg/dl    Performed on ACCU-CHEK    POCT Glucose   Result Value Ref Range    POC Glucose 156 (H) 70 - 99 mg/dl    Performed on ACCU-CHEK    POCT Glucose   Result Value Ref Range    POC Glucose 134 (H) 70 - 99 mg/dl    Performed on ACCU-CHEK    POCT Glucose   Result Value Ref Range    POC Glucose 106 (H) 70 - 99 mg/dl    Performed on ACCU-CHEK    POCT Glucose   Result Value Ref Range    POC Glucose 109 (H) 70 - 99 mg/dl    Performed on ACCU-CHEK    POCT Glucose   Result Value Ref Range    POC Glucose 136 (H) 70 - 99 mg/dl    Performed on ACCU-CHEK    POCT Glucose   Result Value Ref Range    POC Glucose 171 (H) 70 - 99 mg/dl    Performed on ACCU-CHEK    EKG 12 Lead   Result Value Ref Range    Ventricular Rate 62 BPM    Atrial Rate 62 BPM    P-R Interval 172 ms    QRS Duration 100 ms    Q-T Interval 416 ms    QTc Calculation (Bazett) 422 ms    P Axis 0 degrees    R Axis 21 degrees    T Axis 8 degrees    Diagnosis       Normal sinus rhythmNormal ECGNo previous ECGs availableConfirmed by Wilhemina Siemens MD, RAKESH ([id number]) on 5/21/2021 8:31:12 AM       All other labs were within normal range ornot returned as of this dictation. EKG: All EKG's are interpreted by the Emergency Department Physician who either signs or Co-signs this chart in the absence of a cardiologist.  Please see their note for interpretation of EKG.     EKG Interpretation    Interpreted by emergency department physician    Rhythm: normal sinus   Rate: normal  Axis: normal  Ectopy: none  Conduction: normal  ST Segments: normal  T Waves: normal  Q Waves: none    Clinical Impression: normal sinus rhythm      RADIOLOGY:   Non-plain film images such as CT, Ultrasound and MRI are read by the radiologist.  Plain radiographic images are visualized and preliminarily interpreted by the ED Provider with the belowfindings:    Interpretation per the Radiologist below, if available at the time of this note:    MRI IAC POSTERIOR FOSSA W WO CONTRAST   Final Result   1. Unremarkable MRI of the internal auditory canals without and with contrast.   2. No acute intracranial abnormality. Unremarkable MRI of the brain without   and with contrast.         CT ABDOMEN PELVIS W IV CONTRAST Additional Contrast? None   Final Result   Cholelithiasis. No obvious cholecystitis by CT scan      Diverticulosis and normal caliber appendix. CT HEAD WO CONTRAST   Final Result   No evidence of acute intracranial abnormality.                PROCEDURES   Unless otherwise noted below, none     Procedures    CRITICAL CARE TIME   N/A    CONSULTS:  IP CONSULT TO HOSPITALIST      EMERGENCY DEPARTMENT COURSE and DIFFERENTIAL DIAGNOSIS/MDM:   Vitals:    Vitals:    05/22/21 0956 05/22/21 1045 05/22/21 1303 05/22/21 2136   BP:  (!) 140/72 134/68 130/65   Pulse:  60 65 61   Resp:  20 16 16   Temp:  98.1 °F (36.7 °C) 97.5 °F (36.4 °C) 99.1 °F (37.3 °C)   TempSrc:  Oral Oral Oral   SpO2:  95% 95% 94%   Weight:       Height: 6' (1.829 m)          Patient was given the following medications:  Medications   ALPRAZolam (XANAX) tablet 0.5 mg (0.5 mg Oral Given 5/21/21 2242)   aspirin chewable tablet 81 mg (81 mg Oral Given 5/22/21 1112)   escitalopram (LEXAPRO) tablet 10 mg (10 mg Oral Given 5/22/21 1112)   insulin lispro (HUMALOG) injection vial 0-6 Units (0 Units Subcutaneous Not Given 5/22/21 1726)   insulin lispro (HUMALOG) injection vial 0-3 Units (0 Units Subcutaneous Not Given 5/21/21 2247)   pantoprazole (PROTONIX) tablet 40 mg (40 mg Oral Given 5/22/21 0645)   metoprolol succinate (TOPROL XL) extended release tablet 100 mg (100 mg Oral Given 5/22/21 1111)   sodium chloride flush 0.9 % injection 5-40 mL (10 mLs Intravenous Given 5/22/21 1112)   sodium chloride flush 0.9 % injection 5-40 mL (has no administration in time range)   0.9 % sodium chloride infusion (has no administration in time range)   enoxaparin (LOVENOX) injection 40 mg (40 mg Subcutaneous Given 5/22/21 1112)   polyethylene glycol (GLYCOLAX) packet 17 g (has no administration in time range)   acetaminophen (TYLENOL) tablet 650 mg (has no administration in time range)     Or   acetaminophen (TYLENOL) suppository 650 mg (has no administration in time range)   0.9 % sodium chloride infusion ( Intravenous New Bag 5/21/21 2238)   potassium chloride (KLOR-CON M) extended release tablet 40 mEq (has no administration in time range)     Or   potassium bicarb-citric acid (EFFER-K) effervescent tablet 40 mEq (has no administration in time range)     Or   potassium chloride 10 mEq/100 mL IVPB (Peripheral Line) (has no administration in time range)   magnesium sulfate 2000 mg in 50 mL IVPB premix (has no administration in time range)   promethazine (PHENERGAN) injection 12.5 mg (has no administration in time range)   ondansetron (ZOFRAN) injection 4 mg (has no administration in time range)   meclizine (ANTIVERT) tablet 25 mg (25 mg Oral Given 5/22/21 1112)   scopolamine (TRANSDERM-SCOP) transdermal patch 1 patch (1 patch Transdermal Patch Applied 5/21/21 1236)   methylPREDNISolone sodium (SOLU-MEDROL) injection 40 mg (40 mg Intravenous Given 5/22/21 1112)   ondansetron (ZOFRAN) injection 4 mg (4 mg Intravenous Given 5/21/21 0631)   meclizine (ANTIVERT) chewable tablet 25 mg (25 mg Oral Given 5/21/21 0630)   iopamidol (ISOVUE-370) 76 % injection 75 mL (75 mLs Intravenous Given 5/21/21 0700)   promethazine (PHENERGAN) injection 12.5 mg (12.5 mg Intravenous Given 5/21/21 0830)   metoclopramide (REGLAN) injection 10 mg (10 mg Intravenous Given 5/21/21 1002)   diphenhydrAMINE (BENADRYL) injection 50 mg (50 mg Intravenous Given 5/21/21 1002)   gadoteridol (PROHANCE) injection 20 mL (20 mLs Intravenous Given 5/22/21 1223)       ED Course as of May 22 2139   Fri May 21, 2021   0804 Patient is still having significant nausea and his head feels \"goofy\". His vertiginous symptoms are worse with movement, and better with sitting upright.    [TC]      ED Course User Index  [TC] Marin Loera MD     Patient is a 77year old male who presents to the ED complaining of significant vertiginous symptoms with nausea/vomiting. His symptoms are somewhat fatigueable. His exam is consistent with a peripheral cause of his vertigo. Patient's workup thus far is normal.  He is still significantly symptomatic. Additional medication ordered to control symptoms. Case transitioned to the daytime physician, Dr. Karsten Castro, for follow up and final disposition. 07:00a.m. I have signed out [facility] [place] Emergency Department care to Dr. Karsten Castro. We discussed the history, physical exam, completed/pending test results (if obtained) and current treatment plan. Please refer to his/her chart for the patients further details, remaining Emergency Department course, final disposition and diagnosis.     (Please note that portions of this note were completed with a voice recognition program.  Efforts were made to edit the dictations but occasionally words are mis-transcribed.)    Marin Loera MD(electronically signed)              Marin Loera MD  05/22/21 6679

## 2021-05-21 NOTE — PLAN OF CARE
Admit 2W    Suspected BPPV with N/V: CT head without acute findings, unilateral nystagmus, meclizine, phenergan, scopolamine, PT evaluation

## 2021-05-22 ENCOUNTER — APPOINTMENT (OUTPATIENT)
Dept: MRI IMAGING | Age: 66
DRG: 149 | End: 2021-05-22
Payer: MEDICARE

## 2021-05-22 LAB
A/G RATIO: 1.8 (ref 1.1–2.2)
ALBUMIN SERPL-MCNC: 4 G/DL (ref 3.4–5)
ALP BLD-CCNC: 47 U/L (ref 40–129)
ALT SERPL-CCNC: 17 U/L (ref 10–40)
ANION GAP SERPL CALCULATED.3IONS-SCNC: 10 MMOL/L (ref 3–16)
AST SERPL-CCNC: 14 U/L (ref 15–37)
BASOPHILS ABSOLUTE: 0 K/UL (ref 0–0.2)
BASOPHILS RELATIVE PERCENT: 0.4 %
BILIRUB SERPL-MCNC: 0.5 MG/DL (ref 0–1)
BUN BLDV-MCNC: 16 MG/DL (ref 7–20)
CALCIUM SERPL-MCNC: 8.6 MG/DL (ref 8.3–10.6)
CHLORIDE BLD-SCNC: 101 MMOL/L (ref 99–110)
CO2: 27 MMOL/L (ref 21–32)
CREAT SERPL-MCNC: 0.7 MG/DL (ref 0.8–1.3)
EOSINOPHILS ABSOLUTE: 0.1 K/UL (ref 0–0.6)
EOSINOPHILS RELATIVE PERCENT: 1.2 %
ESTIMATED AVERAGE GLUCOSE: 131.2 MG/DL
GFR AFRICAN AMERICAN: >60
GFR NON-AFRICAN AMERICAN: >60
GLOBULIN: 2.2 G/DL
GLUCOSE BLD-MCNC: 102 MG/DL (ref 70–99)
GLUCOSE BLD-MCNC: 109 MG/DL (ref 70–99)
GLUCOSE BLD-MCNC: 115 MG/DL (ref 70–99)
GLUCOSE BLD-MCNC: 136 MG/DL (ref 70–99)
GLUCOSE BLD-MCNC: 171 MG/DL (ref 70–99)
HBA1C MFR BLD: 6.2 %
HCT VFR BLD CALC: 42.3 % (ref 40.5–52.5)
HEMOGLOBIN: 14.7 G/DL (ref 13.5–17.5)
LYMPHOCYTES ABSOLUTE: 2.4 K/UL (ref 1–5.1)
LYMPHOCYTES RELATIVE PERCENT: 21.9 %
MCH RBC QN AUTO: 31.5 PG (ref 26–34)
MCHC RBC AUTO-ENTMCNC: 34.9 G/DL (ref 31–36)
MCV RBC AUTO: 90.1 FL (ref 80–100)
MONOCYTES ABSOLUTE: 1.1 K/UL (ref 0–1.3)
MONOCYTES RELATIVE PERCENT: 10.1 %
NEUTROPHILS ABSOLUTE: 7.2 K/UL (ref 1.7–7.7)
NEUTROPHILS RELATIVE PERCENT: 66.4 %
PDW BLD-RTO: 12.8 % (ref 12.4–15.4)
PERFORMED ON: ABNORMAL
PLATELET # BLD: 152 K/UL (ref 135–450)
PMV BLD AUTO: 8.9 FL (ref 5–10.5)
POTASSIUM REFLEX MAGNESIUM: 3.7 MMOL/L (ref 3.5–5.1)
RBC # BLD: 4.69 M/UL (ref 4.2–5.9)
SODIUM BLD-SCNC: 138 MMOL/L (ref 136–145)
TOTAL PROTEIN: 6.2 G/DL (ref 6.4–8.2)
WBC # BLD: 10.8 K/UL (ref 4–11)

## 2021-05-22 PROCEDURE — 97162 PT EVAL MOD COMPLEX 30 MIN: CPT

## 2021-05-22 PROCEDURE — 97116 GAIT TRAINING THERAPY: CPT

## 2021-05-22 PROCEDURE — 97166 OT EVAL MOD COMPLEX 45 MIN: CPT

## 2021-05-22 PROCEDURE — 99232 SBSQ HOSP IP/OBS MODERATE 35: CPT | Performed by: INTERNAL MEDICINE

## 2021-05-22 PROCEDURE — 2580000003 HC RX 258: Performed by: PHYSICIAN ASSISTANT

## 2021-05-22 PROCEDURE — 6370000000 HC RX 637 (ALT 250 FOR IP): Performed by: PHYSICIAN ASSISTANT

## 2021-05-22 PROCEDURE — 6360000002 HC RX W HCPCS: Performed by: PHYSICIAN ASSISTANT

## 2021-05-22 PROCEDURE — 97530 THERAPEUTIC ACTIVITIES: CPT

## 2021-05-22 PROCEDURE — 1200000000 HC SEMI PRIVATE

## 2021-05-22 PROCEDURE — 97535 SELF CARE MNGMENT TRAINING: CPT

## 2021-05-22 PROCEDURE — 85025 COMPLETE CBC W/AUTO DIFF WBC: CPT

## 2021-05-22 PROCEDURE — 6360000002 HC RX W HCPCS: Performed by: INTERNAL MEDICINE

## 2021-05-22 PROCEDURE — 36415 COLL VENOUS BLD VENIPUNCTURE: CPT

## 2021-05-22 PROCEDURE — 70553 MRI BRAIN STEM W/O & W/DYE: CPT

## 2021-05-22 PROCEDURE — 6360000004 HC RX CONTRAST MEDICATION: Performed by: INTERNAL MEDICINE

## 2021-05-22 PROCEDURE — A9579 GAD-BASE MR CONTRAST NOS,1ML: HCPCS | Performed by: INTERNAL MEDICINE

## 2021-05-22 PROCEDURE — 80053 COMPREHEN METABOLIC PANEL: CPT

## 2021-05-22 RX ORDER — METHYLPREDNISOLONE SODIUM SUCCINATE 40 MG/ML
40 INJECTION, POWDER, LYOPHILIZED, FOR SOLUTION INTRAMUSCULAR; INTRAVENOUS DAILY
Status: DISCONTINUED | OUTPATIENT
Start: 2021-05-22 | End: 2021-05-23 | Stop reason: HOSPADM

## 2021-05-22 RX ADMIN — METHYLPREDNISOLONE SODIUM SUCCINATE 40 MG: 40 INJECTION, POWDER, FOR SOLUTION INTRAMUSCULAR; INTRAVENOUS at 11:12

## 2021-05-22 RX ADMIN — ENOXAPARIN SODIUM 40 MG: 40 INJECTION SUBCUTANEOUS at 11:12

## 2021-05-22 RX ADMIN — ESCITALOPRAM OXALATE 10 MG: 10 TABLET ORAL at 11:12

## 2021-05-22 RX ADMIN — GADOTERIDOL 20 ML: 279.3 INJECTION, SOLUTION INTRAVENOUS at 12:23

## 2021-05-22 RX ADMIN — ASPIRIN 81 MG: 81 TABLET, CHEWABLE ORAL at 11:12

## 2021-05-22 RX ADMIN — Medication 10 ML: at 11:12

## 2021-05-22 RX ADMIN — METOPROLOL SUCCINATE 100 MG: 50 TABLET, EXTENDED RELEASE ORAL at 11:11

## 2021-05-22 RX ADMIN — PANTOPRAZOLE SODIUM 40 MG: 40 TABLET, DELAYED RELEASE ORAL at 06:45

## 2021-05-22 RX ADMIN — MECLIZINE HYDROCHLORIDE 25 MG: 25 TABLET ORAL at 11:12

## 2021-05-22 NOTE — PLAN OF CARE
Nutrition Problem #1: Inadequate oral intake  Intervention: Food and/or Nutrient Delivery: Continue Current Diet  Nutritional Goals: patient will consume 75% or greater of meals on Carb Control diet without s/s of GI distress'

## 2021-05-22 NOTE — PROGRESS NOTES
IM Progress Note    Admit Date:  5/21/2021  1    Interval history:  Dizziness and nystagmus    Subjective:  Mr. Eron Vicente seen up in bed reports nausea is better today , dizziness improving but still unable to walk  Reports sudden hearing loss in left ear     No weakness in arms or legs     Objective:   /64   Pulse 74   Temp 97 °F (36.1 °C) (Oral)   Resp 16   Ht 6' (1.829 m)   Wt 220 lb (99.8 kg)   SpO2 96%   BMI 29.84 kg/m²       Intake/Output Summary (Last 24 hours) at 5/22/2021 0806  Last data filed at 5/22/2021 9854  Gross per 24 hour   Intake 1223.1 ml   Output 1500 ml   Net -276.9 ml       Physical Exam:        General:  Elderly male up in bed  Awake, alert and oriented. Appears to be not in any distress  Mucous Membranes:  Pink , anicteric  Neck: No JVD, no carotid bruit, no thyromegaly  Chest:  Clear to auscultation bilaterally, no added sounds  Cardiovascular:  RRR S1S2 heard, no murmurs or gallops  Abdomen:  Soft, undistended, non tender, no organomegaly, BS present  Extremities:    No edema or cyanosis. Distal pulses well felt  Neurological : mentation clear. No facial deviation   Left side hearing loss. ( has mild hearing loss before but now completely gone )   CN 2 to 12 intact  EOM intact  No nystagmus  Coordination normal in UE  No weakness in UE. Upon standing becomes dizzy and ataxic.  rombergs positive        Medications:   Scheduled Medications:    aspirin  81 mg Oral Daily    escitalopram  10 mg Oral Daily    insulin lispro  0-6 Units Subcutaneous TID WC    insulin lispro  0-3 Units Subcutaneous Nightly    pantoprazole  40 mg Oral QAM AC    metoprolol succinate  100 mg Oral Daily    sodium chloride flush  5-40 mL Intravenous 2 times per day    enoxaparin  40 mg Subcutaneous Daily    scopolamine  1 patch Transdermal Q72H    losartan  100 mg Oral Daily    And    hydroCHLOROthiazide  12.5 mg Oral Daily     I   sodium chloride      sodium chloride 75 mL/hr at 05/21/21 9688 ALPRAZolam, sodium chloride flush, sodium chloride, polyethylene glycol, acetaminophen **OR** acetaminophen, potassium chloride **OR** potassium alternative oral replacement **OR** potassium chloride, magnesium sulfate, promethazine, ondansetron, meclizine    Lab Data:  Recent Labs     05/21/21  0555 05/22/21  0549   WBC 8.9 10.8   HGB 14.9 14.7   HCT 43.4 42.3   MCV 90.6 90.1    152     Recent Labs     05/21/21  0555 05/22/21  0549    138   K 4.0 3.7    101   CO2 24 27   BUN 23* 16   CREATININE 0.8 0.7*     No results for input(s): CKTOTAL, CKMB, CKMBINDEX, TROPONINI in the last 72 hours. Coagulation: No results found for: INR, APTT  Cardiac markers:   Lab Results   Component Value Date    TROPONINI <0.006 12/29/2009         Lab Results   Component Value Date    ALT 17 05/22/2021    AST 14 (L) 05/22/2021    ALKPHOS 47 05/22/2021    BILITOT 0.5 05/22/2021       No results found for: INR, PROTIME    Radiology     CULTURES  COVID: not detected     EKG:  I have reviewed the EKG with the following interpretation:   NSR     RADIOLOGY  CT ABDOMEN PELVIS W IV CONTRAST Additional Contrast? None   Final Result   Cholelithiasis. No obvious cholecystitis by CT scan       Diverticulosis and normal caliber appendix.           CT HEAD WO CONTRAST   Preliminary Result   No evidence of acute intracranial abnormality.                      ASSESSMENT/PLAN:    Acute labyrinthitis  - severe dizziness with left hearing loss, nystagmus and emesis  - CT head neg. MRI head with no occular lesion or mass, MRI brain neg  - start on steroids, PT OT eval     Hypertension  - BP elevated. hold Losartan Toprol-XL.    GERD  - on PPI.      Anxiety  - Xanax nightly prn  - Continue Lexapro     DM type 2  - monitor glucose.  SSI coverage  - Check HgbA1C     DVT Prophylaxis: Lovenox  Diet: diab diet  Code Status: Full Code         Jagdeep Santiago MD, MD 5/22/2021 8:06 AM

## 2021-05-22 NOTE — PROGRESS NOTES
Inpatient Physical Therapy Evaluation and Treatment    Unit: [address]  Date:  5/22/2021  Patient Name:    Isidro Purdy  Admitting diagnosis:  Intractable nausea and vomiting [R11.2]  Admit Date:  5/21/2021  Precautions/Restrictions/WB Status/ Lines/ Wounds/ Oxygen: Fall risk and Lines -IV    Treatment Time:  16:21-16:55  Treatment Number:  1   Timed Code Treatment Minutes: 24 minutes  Total Treatment Minutes:  34  minutes    Patient Goals for Therapy: \" to feel better and walk to the bathroom \"          Discharge Recommendations: Home PRN assist and OP PT  DME needs for discharge: RW and Shower Chair       Therapy recommendation for EMS Transport: NA    Therapy recommendations for staff:   Assist of 1 with use of rolling walker (RW) and gait belt for all transfers and ambulation within room    History of Present Illness: H&P, 5/21/2021, [place]:  \" 77 y. o. male with hypertension, hyperlipidemia, GERD, borderline DM, BPH, and anxiety who presents to [facility] with dizziness, nausea, vomiting. He woke up around 0300 this morning.  Reports he was dizzy.  He thought his sugar was low.  He ate a doughnut stick and is improved a little bit.  Any movement makes dizziness worse. Irma Shearer feels improved slightly.  He had nausea/vomiting.  His wife reports he has inner ear damage and that the patient had complained of tinnitus in his ear.        BP elevated.  Labs stable.  CT head negative.  CT abdomen/pelvis with cholelithiasis, no obvious cholecystitis, diverticulosis and normal caliber appendix.  Admitted to med-surg. \"     MRI and Head CT were negative  Patient started on steroids this date. Home Health S4 Level Recommendation:  NA  AM-PAC Mobility Score    AM-PAC Inpatient Mobility Raw Score : 20       Preadmission Environment    Pt.  Lives with spouse  Home environment:  one story home  Steps to enter first floor: 3 to the porch steps to enter and hand rail unilateral  Steps to second floor: N/A  Bathroom: tub/shower unit and standard height commode  Equipment owned: cane (large base quad cane)    Preadmission Status:  Pt. Able to drive: Yes  Pt Fully independent with ADLs: Yes  Pt sleeps in flat bed  Pt. Required assistance from family for: Independent PTA  Pt. independent for transfers and gait and walked with No Device  History of falls No    Pain   No      Cognition    A&O x4   Able to follow 2 step commands    Subjective  Patient lying supine in bed with spouse present. Pt agreeable to this PT eval & tx. Feels better than yesterday-no nausea with activity, less dizziness  Upper Extremity ROM/Strength  Please see OT evaluation. Lower Extremity ROM / Strength   AROM WFL: Yes  ROM limitations:   WFL to complete bed mobility and transfers. and Strength not formally assessed with mmt.      Lower Extremity Sensation    NT    Lower Extremity Proprioception:   NT    Coordination and Tone  NT    Balance  Sitting:  Normal; Independent  Comments:     Standing: Good - ; CGA  Comments: using RW    Bed Mobility   Supine to Sit:    Independent  Sit to Supine:   Independent  Rolling:   Not Tested  Scooting in sitting: Independent  Scooting in supine:  Not Tested    Transfer Training     Sit to stand:   CGA from EOB, low toilet  Stand to sit:   CGA to low toilet , EOB  Bed to Chair:   Not Tested with use of N/A    Gait gait completed as indicated below  Distance:      25,25 ft  Deviations (firm surface/linoleum):  decreased lien, step through pattern and decreased step length on left  Assistive Device Used:    gait belt and rolling walker (RW)  Level of Assist:    CGA  Comment: VC for sequencing, no LOB    Stair Training deferred, pt unsafe/ not appropriate to complete stairs at this time    Activity Tolerance   Pt completed therapy session with No adverse symptoms noted with activity   BP HR SpO2 Patient Comments   Supine, at rest 131/73 69 95% on RA       Positioning Needs   Pt in bed, no alarm set at begining of session, positioned in proper neutral alignment and pressure relief provided. Call light provided and all needs within reach    Exercises Initiated  Kamran deferred secondary to treatment focus on functional mobility      Other  None. Patient/Family Education   Pt educated on role of inpatient PT, POC, importance of continued activity, DC recommendations, safety awareness, transfer techniques and calling for assist with mobility. Assessment  Pt seen for Physical Therapy evaluation in acute care setting. Pt demonstrated decreased Activity tolerance, Balance and Safety as well as decreased independence with Ambulation and Transfers. Patient will benefit form skilled PT while in acute care. Recommending out patient PT at discharge to improve balance and safety. Goals : To be met in 3 visits:  1). Independent with LE Ex x 10 reps  2.) Bed to chair: Supervision    To be met in 6 visits:  1). Supine to/from sit: Independent  2). Sit to/from stand: Supervision  3). Bed to chair: Supervision  4). Gait: Ambulate 125 ft.   with  SBA and use of LRAD (least restrictive assistive device)  5). Tolerate B LE exercises 3 sets of 10-15 reps  6). Ascend/descend 3 steps with CGA with use of hand rail unilateral    Rehabilitation Potential: Good  Strengths for achieving goals include:   Pt motivated, PLOF, Family Support and Pt cooperative   Barriers to achieving goals include:    Complexity of condition    Plan    To be seen 2-3 x / week  while in acute care setting for therapeutic exercises, bed mobility, transfers, progressive gait training, balance training, and family/patient education. Signature: Debora Mackenzie, PT #[id number]     If patient discharges from this facility prior to next visit, this note will serve as the Discharge Summary.

## 2021-05-22 NOTE — PROGRESS NOTES
Comprehensive Nutrition Assessment    Type and Reason for Visit:  Initial, Positive Nutrition Screen, Consult (+MST=3; +N/V, Poor intake; false+ for weight loss- intentional)    Nutrition Recommendations/Plan:   1. Please obtain an updated actual weight on this patient. 2. Will continue Carb Control diet  3. Monitor appetite and meal intake. 4.Monitor weight trends, bowel function, nutrition related lab values, and for s/s of GI distress. Nutrition Assessment:  pt was nutritionally compromised upon admission AEB intractable N/V PTA, poor appetite/intake PTA, however pt has improved from a nutritional standpoint AEB pt diet advanced to Carb Control diet from Clear liquid diet on 5/22; pt is at risk for further compromise d/t GI dysfunction, endocrine dysfunction and altered nutrition related lab values. Will continue Carb Control diet    Malnutrition Assessment:  Malnutrition Status: At risk for malnutrition (Comment)    Context:  Acute Illness     Findings of the 6 clinical characteristics of malnutrition:  Energy Intake:  Mild decrease in energy intake (Comment) (x1 day PTA)  Weight Loss:  No significant weight loss (intentional weight loss PTA)     Body Fat Loss:  Unable to assess (pt in MRI at time of assessment)     Muscle Mass Loss:  Unable to assess (pt in MRI at time of assessment)    Fluid Accumulation:  No significant fluid accumulation (noted)     Strength:  Not Performed    Estimated Daily Nutrient Needs:  Energy (kcal):  1971-8678 kcals/day based on 20-23 kcals/kg/CBW; Weight Used for Energy Requirements:  Current     Protein (g):  105-121 g protein/day based on 1.3-1.5 g/kg/IBW;  Weight Used for Protein Requirements:  Ideal        Fluid (ml/day):  5050-9036 ml/day; Method Used for Fluid Requirements:  1 ml/kcal      Nutrition Related Findings:  pt in MRI at time of assessment; noted during an office visit on 2/25/21- pt reported that he had gained some weight during the pandemic, therefore decided to start dieting and exercise, resulting in an intentional 23# weight loss. False+ weight loss. noted A1C 6.5% at 2/25/21 office visit; hx of DM II, pt allergic to metformin and related. noted his DM is controlled with diet. Current A1C 6.2%, BG (109) elevated at this time; abd soft, rounded. +BS active; Protonix, Humalog, Cozaar, Hydrodiuril, Toprol XL, Lovenox ordered at time time; noted upon admission, pt reported he woke up around 0300 this morning +was dizzy. He thought his sugar was low, so he ate a doughnut stick and it improved slightly. noted pt reported  any movement made the dizziness worse. noted he felt slightly improved yesterday afternoon. pt reported +nausea/vomiting PTA. noted pt wife reports he has inner ear damage and that the patient had complained of tinnitus in his ear PTA;      Wounds:  None       Current Nutrition Therapies:    DIET CARB CONTROL; Anthropometric Measures:  · Height: 6' (182.9 cm)  · Current Body Weight: 220 lb (99.8 kg) (stated)   · Admission Body Weight: 220 lb (99.8 kg) (stated)    · Usual Body Weight: 222 lb (100.7 kg) (obtained 2/25/21;)     · Ideal Body Weight: 178 lbs; % Ideal Body Weight 123.6 %   · BMI: 29.8  · BMI Categories: Overweight (BMI 25.0-29. 9)       Nutrition Diagnosis:   · Inadequate oral intake related to inadequate protein-energy intake, altered GI function, endocrine dysfuntion as evidenced by poor intake prior to admission, nausea, vomiting, lab values, GI abnormality    Nutrition Interventions:   Food and/or Nutrient Delivery:  Continue Current Diet  Nutrition Education/Counseling:  No recommendation at this time   Coordination of Nutrition Care:  Continue to monitor while inpatient    Goals:  patient will consume 75% or greater of meals on Carb Control diet without s/s of GI distress       Nutrition Monitoring and Evaluation:   Behavioral-Environmental Outcomes:  None Identified   Food/Nutrient Intake Outcomes:  Diet Advancement/Tolerance, Food and Nutrient Intake  Physical Signs/Symptoms Outcomes:  Biochemical Data, GI Status, Nausea or Vomiting, Nutrition Focused Physical Findings, Weight     Discharge Planning:    Continue current diet     Electronically signed by Lilian Castro RD, LD on 5/22/21 at 12:25 PM EDT    Contact: [phone]

## 2021-05-22 NOTE — CARE COORDINATION
Case Management Assessment  Initial Evaluation      Patient Name: Chelle Flannery  YOB: 1955  Diagnosis: Intractable nausea and vomiting [R11.2]  Date / Time: 5/21/2021  5:46 AM    Admission status/Date:5/21/2021  Chart Reviewed: Yes      Patient Interviewed: Yes   Family Interviewed:  Yes - spouse at bedside with pt permission      Hospitalization in the last 30 days:  No      Health Care Decision Maker :     (CM - must 1st enter selection under Navigator - emergency contact- Health Care Decision Maker Relationship and pick relationship)   Who do you trust or have selected to make healthcare decisions for you    Spouse Ysabel Bain [phone]    Met with: pt and spouse  Ida Alfred conducted  (bedside/phone):bedside    Current PCP:   Dr Jolie Montes De Oca required for SNF : N          3 night stay required - waived    ADLS  Support Systems/Care Needs: Spouse/Significant Other  Transportation: self    Meal Preparation: self    Housing  Living Arrangements: lives in 1 story house with spouse  Steps: [address] [facility] for return to present living arrangements: Yes  Identified Issues:     [address] with [address] : No Agency:(Services)  Type of Home Care Services: None  Passport/Waiver : No  :                      Phone Number:    Passport/Waiver Services: none          Durable Medical Equiptment   DME Provider: none  Equipment: none  Walker___Cane___RTS___ BSC___Shower Chair___Hospital Bed___W/C____Other________  02 at ____Liter(s)---wears(frequency)_______ HHN ___ CPAP___ BiPap___   N/A____      Home O2 Use :  No    If No for home O2---if presently on O2 during hospitalization:  No      Community Service Affiliation  Dialysis:  No    · Agency:  · Location:  · Dialysis Schedule:  · Phone:   · Fax: Other Community Services: none    DISCHARGE PLAN: Explained Case Management role/services. Chart reviewed.  Pt states he lives with spouse in 1 story house and plans to return. Pt is IPTA. +drives ambulates free of device. CM will not follow, please consult us if dc needs should arise.  NN/NF

## 2021-05-22 NOTE — PLAN OF CARE
Problem: Nausea/Vomiting:  Goal: Absence of nausea/vomiting  Description: Absence of nausea/vomiting  Outcome: Ongoing  Goal: Able to drink  Description: Able to drink  Outcome: Ongoing  Goal: Able to eat  Description: Able to eat  Outcome: Ongoing     Problem: Daily Care:  Goal: Daily care needs are met  Description: Daily care needs are met  Outcome: Ongoing

## 2021-05-22 NOTE — PROGRESS NOTES
BP (!) 140/72   Pulse 60   Temp 98.1 °F (36.7 °C) (Oral)   Resp 20   Ht 6' (1.829 m)   Wt 220 lb (99.8 kg)   SpO2 95%   BMI 29.84 kg/m²      Assessment complete. Meds passed. Pt denies needs at this time    PT to go to MRI Per Zach Rankin. MRI questionnaire completed and in chart. RN contacted MRI to tell them that the pt is ready to go down. Bedside Mobility Assessment Tool (BMAT):     Assessment Level 1- Sit and Shake    1. From a semi-reclined position, ask patient to sit up and rotate to a seated position at the side of the bed. Can use the bedrail. 2. Ask patient to reach out and grab your hand and shake making sure patient reaches across his/her midline. Pass- Patient is able to come to a seated position, maintain core strength. Maintains seated balance while reaching across midline. Move on to Assessment Level 2. Assessment Level 2- Stretch and Point   1. With patient in seated position at the side of the bed, have patient place both feet on the floor (or stool) with knees no higher than hips. 2. Ask patient to stretch one leg and straighten the knee, then bend the ankle/flex and point the toes. If appropriate, repeat with the other leg. Pass- Patient is able to demonstrate appropriate quad strength on intended weight bearing limb(s). Move onto Assessment Level 3. Assessment Level 3- Stand   1. Ask patient to elevate off the bed or chair (seated to standing) using an assistive device (cane, bedrail). 2. Patient should be able to raise buttocks off be and hold for a count of five. May repeat once. Pass- Patient maintains standing stability for at least 5 seconds, proceed to assessment level 4. Assessment Level 4- Walk   1. Ask patient to march in place at bedside. 2. Then ask patient to advance step and return each foot. Some medical conditions may render a patient from stepping backwards, use your best clinical judgement.    Fail- Patient not able to complete tasks OR

## 2021-05-22 NOTE — FLOWSHEET NOTE
05/21/21 2234   Vital Signs   Temp 97.8 °F (36.6 °C)   Temp Source Oral   Pulse 72   Heart Rate Source Monitor   Resp 16   /75   BP Location Right upper arm   Patient Position Semi fowlers   Level of Consciousness Alert (0)   MEWS Score 1   Oxygen Therapy   SpO2 97 %   O2 Device None (Room air)   Pt A/O x 4 assessment completed. Pt states no hearing  out of left ear since the dizziness started. Meds given per MAR. PRN Meclizine and Xanax given. Pt on phone with wife at this time Pt denies any needs. Call light within reach and bed alarm on.

## 2021-05-22 NOTE — PROGRESS NOTES
Physical /Occupational Therapy  Hold, per discussion with MD. Patient scheduled for MRI.   Angelica Domingo, PT #[id number]  [place], [place], OTR/L   [id number]

## 2021-05-22 NOTE — PROGRESS NOTES
Bedside report given to [facility] RN  pt in stable condition no needs at this time.  Call light within reach

## 2021-05-22 NOTE — PLAN OF CARE
Problem: Daily Care:  Goal: Daily care needs are met  Description: Daily care needs are met  Outcome: Ongoing     Problem: Pain:  Goal: Patient's pain/discomfort is manageable  Description: Patient's pain/discomfort is manageable  Outcome: Ongoing     Problem: Skin Integrity:  Goal: Skin integrity will stabilize  Description: Skin integrity will stabilize  Outcome: Ongoing     Problem: Nutrition  Goal: Optimal nutrition therapy  5/22/2021 1224 by Ag Arias RD, LD  Outcome: Ongoing  Goal: Understanding of nutritional guidelines  5/22/2021 1224 by Ag Arias RD, LD  Outcome: Ongoing

## 2021-05-22 NOTE — PROGRESS NOTES
Inpatient Occupational Therapy  Evaluation and Treatment    Unit: [facility]  Date:  5/22/2021  Patient Name:    Kaylen Etienne  Admitting diagnosis:  Intractable nausea and vomiting [R11.2]  Admit Date:  5/21/2021  Precautions/Restrictions/WB Status/ Lines/ Wounds/ Oxygen: fall risk and IV    Treatment Time:  3880-0710  Treatment Number: 1     Billable Treatment Time: 25 minutes   Total Treatment Time:   35   minutes    Patient Goals for Therapy:  \" to go home \"      Discharge Recommendations: Home with PRN assistance and outpatient physical therapy   DME needs for discharge: walker and shower chair       Therapy recommendations for staff:  Assist of 1 with use of rolling walker and gait belt for all ambulation to/from bathroom    History of Present Illness: H&P, 5/21/2021, [place]:  \" 77 y.o. male with hypertension, hyperlipidemia, GERD, borderline DM, BPH, and anxiety who presents to [facility] with dizziness, nausea, vomiting. He woke up around 0300 this morning. Reports he was dizzy. He thought his sugar was low. He ate a doughnut stick and is improved a little bit. Any movement makes dizziness worse. He feels improved slightly. He had nausea/vomiting. His wife reports he has inner ear damage and that the patient had complained of tinnitus in his ear.        BP elevated. Labs stable. CT head negative. CT abdomen/pelvis with cholelithiasis, no obvious cholecystitis, diverticulosis and normal caliber appendix. Admitted to med-surg. \"     Home Health S4 Level Recommendation:  NA    AM-PAC Score: AM-PAC Inpatient Daily Activity Raw Score: 20  Pt scored a 18/24 on the AM-PAC ADL Inpatient form. Current research shows that an AM-PAC score of 18 or greater is associated with a discharge to the patient's home setting.  Based on the patient's AM-PAC ADL score and their current functional mobility deficits, it is recommended that the patient have 2-3 sessions per week of Occupational Therapy at d/c to increased the patient's independence. Preadmission Environment    Pt. Lives with spouse  Home environment:            one story home  Steps to enter first floor: 3 to the porch steps to enter and hand rail unilateral  Steps to second floor:         N/A  Bathroom: tub/shower unit and standard height commode  Equipment owned: cane (large base quad cane)     Preadmission Status:  Pt. Able to drive: Yes  Pt Fully independent with ADLs: Yes  Pt sleeps in flat bed  Pt. Required assistance from family for: Independent PTA  Pt. independent for transfers and gait and walked with No Device  History of falls          No    Pain:  None reported throughout     Cognition:    A&O x4   Able to follow 2 step commands, fairly consistently . Limited by [facility]     Subjective:  Pt supine in bed upon therapist arrival. Pt agreeable to work with therapy this date. Family present throughout session. Upper Extremity ROM:   WFL,  pt able to perform all bed mobility, transfers, and gait without ROM limitation. Upper Extremity Strength:    BUE strength WFL, but not formally assessed w/ MMT    Upper Extremity Sensation:    WNL    Upper Extremity Proprioception:  WNL    Coordination and Tone:  WNL    Balance:  Functional Sitting Balance: WNL   Comments: Socks at EOB, no LOB   Functional Standing Balance:WFL   Comments: Benefited from RW. No LOB. Bed mobility:    Supine to sit: Independent  Sit to supine:   Independent  Rolling:    Not Tested  Scooting in sitting:  Independent  Scooting to head of bed:   Not Tested   Bridging:   Yes    Transfers:    Sit to stand:  CGA  Stand to sit:  CGA  Bed to chair:   CGA  Standard toilet: [facility]  Bed to [facility]:  Not Tested   Pt completed functional mobility of household distance in preparation for standing ADL/IADLs this date with CGA and RW.      Activities of Daily Living:   UB Dressing:   Not Tested  LB Dressing:    supervision  UB Bathing:  Not Tested  LB Bathing:  Not Tested  Feeding:  Independent  Grooming:   CGA  Toileting:  CGA    Activity Tolerance:   Pt completed therapy session with Dizziness noted with all activity. BP (mmHg) HR (bpm) SpO2 (%) Comments   Supine, at rest 131/73 69 95 On room air      Positioning Needs: In bed, call light and needs in reach. Exercise / Activities Initiated:   NT    Patient/Family Education:   Role of OT    Assessment of Deficits: Pt seen for Occupational therapy evaluation in acute care setting. Pt demonstrated decreased Activity tolerance, ADLs and Balance . Pt functioning below baseline and will likely benefit from skilled occupational therapy services to maximize safety and independence. Goal(s): To be met in 3 Visits:  1). Bed to toilet: Independent    To be met in 5 Visits:  1). Supine to/from Sit:  Independent  2). Upper Body Bathing:   Independent  3). Lower Body Bathing:   Independent  4). Upper Body Dressing:  Independent  5). Lower Body Dressing:  Independent  6). Pt to demonstrate UE exs x 15 reps with minimal cues    Rehabilitation Potential:  Good for goals listed above. Strengths for achieving goals include: Pt motivated, PLOF, Family Support and Pt cooperative  Barriers to achieving goals include:  No barriers     Plan: To be seen 3-5 x/wk while in acute care setting for transfer training, family/patient education and ADL/IADL retraining, balance training.       Yvette Rea, SHANNAN, OTR/L   [id number]           If patient discharges from this facility prior to next visit, this note will serve as the Discharge Summary

## 2021-05-23 VITALS
TEMPERATURE: 97.9 F | HEIGHT: 72 IN | BODY MASS INDEX: 29.8 KG/M2 | RESPIRATION RATE: 16 BRPM | SYSTOLIC BLOOD PRESSURE: 123 MMHG | WEIGHT: 220 LBS | OXYGEN SATURATION: 96 % | DIASTOLIC BLOOD PRESSURE: 73 MMHG | HEART RATE: 65 BPM

## 2021-05-23 LAB
GLUCOSE BLD-MCNC: 104 MG/DL (ref 70–99)
GLUCOSE BLD-MCNC: 118 MG/DL (ref 70–99)
PERFORMED ON: ABNORMAL
PERFORMED ON: ABNORMAL

## 2021-05-23 PROCEDURE — 6360000002 HC RX W HCPCS: Performed by: INTERNAL MEDICINE

## 2021-05-23 PROCEDURE — 6370000000 HC RX 637 (ALT 250 FOR IP): Performed by: PHYSICIAN ASSISTANT

## 2021-05-23 PROCEDURE — 99238 HOSP IP/OBS DSCHRG MGMT 30/<: CPT | Performed by: INTERNAL MEDICINE

## 2021-05-23 PROCEDURE — 97116 GAIT TRAINING THERAPY: CPT

## 2021-05-23 PROCEDURE — 6360000002 HC RX W HCPCS: Performed by: PHYSICIAN ASSISTANT

## 2021-05-23 PROCEDURE — 2580000003 HC RX 258: Performed by: PHYSICIAN ASSISTANT

## 2021-05-23 PROCEDURE — 97530 THERAPEUTIC ACTIVITIES: CPT

## 2021-05-23 RX ORDER — PREDNISONE 20 MG/1
TABLET ORAL
Qty: 22 TABLET | Refills: 0 | Status: SHIPPED | OUTPATIENT
Start: 2021-05-23 | End: 2021-06-15 | Stop reason: ALTCHOICE

## 2021-05-23 RX ADMIN — Medication 10 ML: at 09:35

## 2021-05-23 RX ADMIN — ASPIRIN 81 MG: 81 TABLET, CHEWABLE ORAL at 09:34

## 2021-05-23 RX ADMIN — SODIUM CHLORIDE: 9 INJECTION, SOLUTION INTRAVENOUS at 05:01

## 2021-05-23 RX ADMIN — PANTOPRAZOLE SODIUM 40 MG: 40 TABLET, DELAYED RELEASE ORAL at 05:01

## 2021-05-23 RX ADMIN — MECLIZINE HYDROCHLORIDE 25 MG: 25 TABLET ORAL at 00:40

## 2021-05-23 RX ADMIN — METOPROLOL SUCCINATE 100 MG: 50 TABLET, EXTENDED RELEASE ORAL at 09:34

## 2021-05-23 RX ADMIN — ALPRAZOLAM 0.5 MG: 0.5 TABLET ORAL at 00:40

## 2021-05-23 RX ADMIN — ESCITALOPRAM OXALATE 10 MG: 10 TABLET ORAL at 09:35

## 2021-05-23 RX ADMIN — METHYLPREDNISOLONE SODIUM SUCCINATE 40 MG: 40 INJECTION, POWDER, FOR SOLUTION INTRAMUSCULAR; INTRAVENOUS at 09:34

## 2021-05-23 RX ADMIN — ENOXAPARIN SODIUM 40 MG: 40 INJECTION SUBCUTANEOUS at 09:35

## 2021-05-23 NOTE — PROGRESS NOTES
up. Patient declined, citing expense, but will reconsider and discuss with MD if needed. Recommended a shower chair for bathing. Move slowly, acclimate to position changes slowly. Positioning Needs       Pt sititng EOB, no alarm needed, positioned in proper neutral alignment and pressure relief provided. Call light provided and all needs within reach      Activity Tolerance   Pt completed therapy session with No adverse symptoms noted w/activity. Other  Provided RW, adjusted it and completed paperwork. Paperwork given to CM    Assessment :  Patient demonstrated good progress this date. Recommended OP PT in follow-up after discharge. Home with prn assist and OP PT.       Goals : To be met in 3 visits:  1). Independent with LE Ex x 10 reps  2.) Bed to chair: Supervision     To be met in 6 visits:  1). Supine to/from sit: Independent  2). Sit to/from stand: Supervision  3). Bed to chair: Supervision  4). Gait: Ambulate 125 ft.   with  SBA and use of LRAD (least restrictive assistive device)  5). Tolerate B LE exercises 3 sets of 10-15 reps  6). Ascend/descend 3 steps with CGA with use of hand rail unilateral       Plan   Continue with plan of care. Signature: Shalini Jarvis, PT #[id number]    If patient discharges from this facility prior to next visit, this note will serve as the Discharge Summary.

## 2021-05-23 NOTE — PROGRESS NOTES
replacement **OR** potassium chloride, magnesium sulfate, promethazine, ondansetron, meclizine    Lab Data:  Recent Labs     05/21/21  0555 05/22/21  0549   WBC 8.9 10.8   HGB 14.9 14.7   HCT 43.4 42.3   MCV 90.6 90.1    152     Recent Labs     05/21/21  0555 05/22/21  0549    138   K 4.0 3.7    101   CO2 24 27   BUN 23* 16   CREATININE 0.8 0.7*     No results for input(s): CKTOTAL, CKMB, CKMBINDEX, TROPONINI in the last 72 hours. Coagulation: No results found for: INR, APTT  Cardiac markers:   Lab Results   Component Value Date    TROPONINI <0.006 12/29/2009         Lab Results   Component Value Date    ALT 17 05/22/2021    AST 14 (L) 05/22/2021    ALKPHOS 47 05/22/2021    BILITOT 0.5 05/22/2021       No results found for: INR, PROTIME    Radiology     CULTURES  COVID: not detected     EKG:  I have reviewed the EKG with the following interpretation:   NSR     RADIOLOGY  CT ABDOMEN PELVIS W IV CONTRAST Additional Contrast? None   Final Result   Cholelithiasis. No obvious cholecystitis by CT scan       Diverticulosis and normal caliber appendix.           CT HEAD WO CONTRAST   Preliminary Result   No evidence of acute intracranial abnormality.                      ASSESSMENT/PLAN:    Acute labyrinthitis  - severe dizziness with left hearing loss, nystagmus and emesis  - CT head neg. MRI head with no occular lesion or mass, MRI brain neg  - start on steroids, PT OT eval  Doing better today  Dc home on steroid taper      Hypertension  - BP elevated. hold Losartan Toprol-XL.    GERD  - on PPI.      Anxiety  - Xanax nightly prn  - Continue Lexapro     DM type 2  - monitor glucose.  SSI coverage  - Check HgbA1C     DVT Prophylaxis: Lovenox  Diet: diab diet  Code Status: Full Code     Dc home after ot/pt    Jamar Rdz MD, MD 5/23/2021 10:44 AM

## 2021-05-23 NOTE — DISCHARGE INSTR - DIET
of carbohydrates  Your daily amount depends on several things, such as your weight, how active you are, which diabetes medicines you take, and what your goals are for your blood sugar levels. A registered dietitian or certified diabetes educator can help you plan how many carbs to include in each meal and snack. For most adults, a guideline for the daily amount of carbs is:  · 45 to 60 grams at each meal. That's about the same as 3 to 4 carbohydrate servings. · 15 to 20 grams at each snack. That's about the same as 1 carbohydrate serving. Count carbs  Counting carbs lets you know how much rapid-acting insulin to take before you eat. If you use an insulin pump, you get a constant rate of insulin during the day. So the pump must be programmed at meals. This gives you extra insulin to cover the rise in blood sugar after meals. If you take insulin:  · Learn your own insulin-to-carb ratio. You and your diabetes health professional will figure out the ratio. You can do this by testing your blood sugar after meals. For example, you may need a certain amount of insulin for every 15 grams of carbs. · Add up the carb grams in a meal. Then you can figure out how many units of insulin to take based on your insulin-to-carb ratio. · Exercise lowers blood sugar. You can use less insulin than you would if you were not doing exercise. Keep in mind that timing matters. If you exercise within 1 hour after a meal, your body may need less insulin for that meal than it would if you exercised 3 hours after the meal. Test your blood sugar to find out how exercise affects your need for insulin. If you do or don't take insulin:  · Look at labels on packaged foods. This can tell you how many carbs are in a serving. You can also use guides from the American Diabetes Association. · Be aware of portions, or serving sizes.  If a package has two servings and you eat the whole package, you need to double the number of grams of carbohydrate

## 2021-05-23 NOTE — PROGRESS NOTES
/73   Pulse 65   Temp 97.9 °F (36.6 °C) (Oral)   Resp 16   Ht 6' (1.829 m)   Wt 220 lb (99.8 kg)   SpO2 96%   BMI 29.84 kg/m²      Assessment complete. Meds passed. Pt denies needs at this time    Pt states he can see better, doesn't get dizzy from looking around, and he states that he can hear slightly better than yesterday, and the ringing has lessend      Bedside Mobility Assessment Tool (BMAT):     Assessment Level 1- Sit and Shake    1. From a semi-reclined position, ask patient to sit up and rotate to a seated position at the side of the bed. Can use the bedrail. 2. Ask patient to reach out and grab your hand and shake making sure patient reaches across his/her midline. Pass- Patient is able to come to a seated position, maintain core strength. Maintains seated balance while reaching across midline. Move on to Assessment Level 2. Assessment Level 2- Stretch and Point   1. With patient in seated position at the side of the bed, have patient place both feet on the floor (or stool) with knees no higher than hips. 2. Ask patient to stretch one leg and straighten the knee, then bend the ankle/flex and point the toes. If appropriate, repeat with the other leg. Pass- Patient is able to demonstrate appropriate quad strength on intended weight bearing limb(s). Move onto Assessment Level 3. Assessment Level 3- Stand   1. Ask patient to elevate off the bed or chair (seated to standing) using an assistive device (cane, bedrail). 2. Patient should be able to raise buttocks off be and hold for a count of five. May repeat once. Pass- Patient maintains standing stability for at least 5 seconds, proceed to assessment level 4. Assessment Level 4- Walk   1. Ask patient to march in place at bedside. 2. Then ask patient to advance step and return each foot. Some medical conditions may render a patient from stepping backwards, use your best clinical judgement.    Fail- Patient not able to complete tasks OR requires use of assistive device. Patient is MOBILITY LEVEL 3.        Mobility Level- 3   Pt still unbalanced from current condition

## 2021-05-23 NOTE — PLAN OF CARE
Problem: Nausea/Vomiting:  Goal: Absence of nausea/vomiting  Description: Absence of nausea/vomiting  5/23/2021 1211 by Corrina Rebollar RN  Outcome: Completed  5/23/2021 0005 by Stephania Alejandro RN  Outcome: Ongoing  Goal: Able to drink  Description: Able to drink  5/23/2021 1211 by Corrina Rebollar RN  Outcome: Completed  5/23/2021 0005 by Stephania Alejandro RN  Outcome: Ongoing  Goal: Able to eat  Description: Able to eat  Outcome: Completed  Goal: Ability to achieve adequate nutritional intake will improve  Description: Ability to achieve adequate nutritional intake will improve  Outcome: Completed     Problem: Infection:  Goal: Will remain free from infection  Description: Will remain free from infection  Outcome: Completed     Problem: Safety:  Goal: Free from accidental physical injury  Description: Free from accidental physical injury  5/23/2021 1211 by Corrina Rebollar RN  Outcome: Completed  5/23/2021 0005 by Stephania Alejandro RN  Outcome: Ongoing  Goal: Free from intentional harm  Description: Free from intentional harm  Outcome: Completed     Problem: Daily Care:  Goal: Daily care needs are met  Description: Daily care needs are met  5/23/2021 1211 by Corrina Rebollar RN  Outcome: Completed  5/23/2021 0005 by Stephania Alejandro RN  Outcome: Ongoing     Problem: Pain:  Goal: Patient's pain/discomfort is manageable  Description: Patient's pain/discomfort is manageable  5/23/2021 1211 by Corrina Rebollar RN  Outcome: Completed  5/23/2021 0005 by Stephania Alejandro RN  Outcome: Ongoing     Problem: Skin Integrity:  Goal: Skin integrity will stabilize  Description: Skin integrity will stabilize  Outcome: Completed     Problem: Discharge Planning:  Goal: Patients continuum of care needs are met  Description: Patients continuum of care needs are met  Outcome: Completed     Problem: Nutrition  Goal: Optimal nutrition therapy  Outcome: Completed  Goal: Understanding of nutritional guidelines  Outcome: Completed     Problem: Falls - Risk of:  Goal: Will remain free from falls  Description: Will remain free from falls  Outcome: Completed  Goal: Absence of physical injury  Description: Absence of physical injury  Outcome: Completed

## 2021-05-23 NOTE — PROGRESS NOTES
Pt discharge instructions given. IV out. Denies needs. Education on steroids and diabetes given. Pt states he is feeling much better today.

## 2021-05-23 NOTE — DISCHARGE INSTR - COC
Continuity of Care Form    Patient Name: Isidro Purdy   :  1955  MRN:  [id number]    Admit date:  2021  Discharge date:  ***    Code Status Order: Full Code   Advance Directives:   Advance Care Flowsheet Documentation     Date/Time Healthcare Directive Type of Healthcare Directive Copy in [address] Agent's Name Healthcare Agent's Phone Number    21 1127  No, patient does not have an advance directive for healthcare treatment -- -- -- -- --          Admitting Physician:  Roseann Fernandez MD  PCP: Mckayla Weiss MD    Discharging Nurse: [facility] Unit/Room#: 0218/0218-01  Discharging Unit Phone Number: ***    Emergency Contact:   Extended Emergency Contact Information  Primary Emergency Contact: Ena Avendaño  Address: [address] [place] [address], [address] [place] [address] Phone: [phone]  Mobile Phone: [phone]  Relation: Spouse  Secondary Emergency Contact: [address] [facility] Phone: [phone]  Relation: Child    Past Surgical History:  Past Surgical History:   Procedure Laterality Date    COLONOSCOPY  2006    hemorroids    COLONOSCOPY N/A 2019    COLONOSCOPY WITH BIOPSY performed by Garrett Little DO at [address] [facility] N/A 2019    COLONOSCOPY POLYPECTOMY SNARE/COLD BIOPSY performed by Garrett Little DO at [facility], COLON, DIAGNOSTIC      THROAT SURGERY  's    had tumors    UPPER GASTROINTESTINAL ENDOSCOPY  2015       Immunization History:   Immunization History   Administered Date(s) Administered    Influenza Virus Vaccine 2016, 10/03/2016, 2017    Influenza, High Dose (Fluzone 65 yrs and older) 10/20/2019    Influenza, High-dose, Quadv, 72 yrs +, IM (Fluzone) 2020    Influenza, Triv, 3 Years and older, IM (Afluria (5 yrs and older) 10/03/2016    Pneumococcal Polysaccharide ([id number]) 2018 Active Problems:  Patient Active Problem List   Diagnosis Code    Hypertension, essential I10    Hyperlipidemia E78.5    Anxiety F41.9    GERD (gastroesophageal reflux disease) K21.9    Type 2 diabetes mellitus without complication, without long-term current use of insulin ([facility]) E11.9    H/O Prinzmetal angina Z86.79    Intractable nausea and vomiting R11.2    Vertigo R42    Acute labyrinthitis, left H83.02       Isolation/Infection:   Isolation          No Isolation        Patient Infection Status     Infection Onset Added Last Indicated Last Indicated By Review Planned Expiration Resolved Resolved By    None active    Resolved    COVID-19 Rule Out 05/21/21 05/21/21 05/21/21 COVID-19, Rapid (Ordered)   05/21/21 Rule-Out Test Resulted          Nurse Assessment:  Last Vital Signs: /73   Pulse 65   Temp 97.9 °F (36.6 °C) (Oral)   Resp 16   Ht 6' (1.829 m)   Wt 220 lb (99.8 kg)   SpO2 96%   BMI 29.84 kg/m²     Last documented pain score (0-10 scale): Pain Level: 0  Last Weight:   Wt Readings from Last 1 Encounters:   05/21/21 220 lb (99.8 kg)     Mental Status:  {IP PT MENTAL STATUS:20030}    IV Access:  { ANAND IV ACCESS:869040696}    Nursing Mobility/ADLs:  Walking   {CHP DME RKOT:146105616}  Transfer  {CHP DME PUWV:952238290}  Bathing  {CHP DME QHYH:075411171}  Dressing  {CHP DME GHHP:027578550}  Toileting  {CHP DME XVNO:964680431}  Feeding  {CHP DME BXPE:536989627}  Med Admin  {CHP DME CVGE:686813104}  Med Delivery   { ANAND MED Delivery:327935840}    Wound Care Documentation and Therapy:        Elimination:  Continence:   · Bowel: {YES / PD:70682}  · Bladder: {YES / MP:61420}  Urinary Catheter: {Urinary Catheter:083248451}   Colostomy/Ileostomy/Ileal Conduit: {YES / IL:78815}       Date of Last BM: ***    Intake/Output Summary (Last 24 hours) at 5/23/2021 1218  Last data filed at 5/23/2021 0502  Gross per 24 hour   Intake 1466.68 ml   Output 2000 ml   Net -533.32 ml     I/O last 3 he requires {Admit to Appropriate Level of Care:65614} for {GREATER/LESS:327233963} 30 days.      Update Admission H&P: {CHP DME Changes in ANSGQ:497803396}    PHYSICIAN SIGNATURE:  {Esignature:701048301}

## 2021-05-23 NOTE — CARE COORDINATION
DISCHARGE ORDER  Date/Time 2021 1:02 PM  Completed by: Lorena Sung RN, Case Management    Patient Name: Asa Bumpers      : 1955  Admitting Diagnosis: Intractable nausea and vomiting [R11.2]      Admit order Date and Status:2021  (verify MD's last order for status of admission)      Noted discharge order. If applicable PT/OT recommendation at Discharge: Home PRN assist and home PT (Declined)  DME recommendation by PT/OT:Shower Chair    Discharge Plan: Chart reviewed. Pt discharged before being seen by CM. Spoke with Betty Carmona in PT who met with pt and supplied him with RW from [employer]. She states pt denied HHC or any dc needs. Spoke with Dima Cuellar bedside RN who state pt had no dc needs. Has Home O2 in place on admit:  No    Pt is being d/c'd to home today. Pt's O2 sats are 96% on RA. Discharge timeout done with Dima Cuellar. All discharge needs and concerns addressed.

## 2021-05-23 NOTE — PROGRESS NOTES
Bedside report given to [facility] RN  pt in stable condition no needs at this time.  Call light within reach

## 2021-05-24 ENCOUNTER — TELEPHONE (OUTPATIENT)
Dept: INTERNAL MEDICINE CLINIC | Age: 66
End: 2021-05-24

## 2021-05-24 ENCOUNTER — CARE COORDINATION (OUTPATIENT)
Dept: CASE MANAGEMENT | Age: 66
End: 2021-05-24

## 2021-05-24 RX ORDER — MECLIZINE HYDROCHLORIDE 25 MG/1
25 TABLET ORAL 3 TIMES DAILY PRN
Qty: 30 TABLET | Refills: 0 | Status: SHIPPED | OUTPATIENT
Start: 2021-05-24 | End: 2021-12-06 | Stop reason: SDUPTHER

## 2021-05-24 NOTE — CARE COORDINATION
Thony 45 Transitions Initial Follow Up Call    Call within 2 business days of discharge: Yes    Patient: Kaylen Etienne Patient : 1955   MRN: [id number]  Reason for Admission: acute labyrithitis, HTN, GERD, Anxiety, DM2  Discharge Date: 21 RARS: Readmission Risk Score: 11      Last Discharge [facility]       Complaint Diagnosis Description Type Department Provider    21 Emesis Vertigo . .. ED to Hosp-Admission (Discharged) (ADMITTED) [facility] 2 Aisha Luong MD; Th... Spoke with: Kamala Bradshaw (spouse) - ok per HIPAA    Facility: [place] [address]    Non-face-to-face services provided:  Obtained and reviewed discharge summary and/or continuity of care documents    Was this an external facility discharge? No Discharge Facility: NA    Challenges to be reviewed by the provider   Additional needs identified to be addressed with provider No       Method of communication with provider : none    Advance Care Planning:   Does patient have an Advance Directive:  decision maker updated. Was this a readmission? No  Patient stated reason for admission: dizziness, n/v  Patients top risk factors for readmission: medical condition-     Care Transition Nurse (CTN) contacted the patient by telephone to perform post hospital discharge assessment. Provided introduction to self, and explanation of the CTN role. CTN reviewed discharge instructions, medical action plan and red flags with patient who verbalized understanding. Patient given an opportunity to ask questions and does not have any further questions or concerns at this time. Were discharge instructions available to patient? Yes. Reviewed appropriate site of care based on symptoms and resources available to patient including: PCP. The patient agrees to contact the PCP office for questions related to their healthcare.      Medication reconciliation was performed with patient, who verbalizes understanding of administration of home medications. COVID Risk Education    DPIOX-[id number] Not Detected on 5/21/2021. Patient is not interested in vaccination at this time. Discussed COVID vaccination status: Yes. Education provided on COVID-19 vaccination as appropriate. Discussed exposure protocols and quarantine with CDC Guidelines. Patient was given an opportunity to verbalize any questions and concerns and agrees to contact CTN or health care provider for questions related to their healthcare. Was patient discharged with a pulse oximeter? No     Still with dizziness and reported to PCP prior to call. Per notes, there is a new med (Antivert) to pharmacy. Wife aware and she or son will  today. Reviewed SE and recommended not driving or operating machinery until known how this med affects him. She states he already mentioned that he was not feeling well to drive.  this morning. He is tolerating PO without n/v. Denies needs or referrals at this time. States she is waiting on PCP office to call back with follow up appointment date/time. Noted that the listed initial home number rings to fax. She states that the best number is her mobile and this was updated as the priority number at this time. CTN provided contact information for future needs. Plan for follow up call in 7-14 days based on severity of symptoms and risk factors.       Care Transitions 24 Hour Call    Schedule Follow Up Appointment with PCP: Completed  Do you have any ongoing symptoms?: Yes  Patient-reported symptoms: Other  Interventions for patient-reported symptoms: Notified PCP/Physician (Comment: wife already notified PCP and a new med is at pharmacy)  Do you have a copy of your discharge instructions?: Yes  Do you have all of your prescriptions and are they filled?: Yes  Have you been contacted by a [facility] Pharmacist?: No  Have you scheduled your follow up appointment?: Yes  How are you going to get to your appointment?: Car - drive self  Were

## 2021-05-24 NOTE — TELEPHONE ENCOUNTER
----- Message from Watson Elkins MD sent at 5/24/2021 12:04 PM EDT -----  Contact: Mrs. Avendaño [phone]  Meclizine 25 mg tid prn   Can cause drowsiness  #30  ----- Message -----  From: Lettyelizabeth Frank  Sent: 5/24/2021  10:42 AM EDT  To: Watson Elkins MD    Patient was in [facility] over the weekend. Wife states patient is still experiencing vertigo. Dr. Petey Ruelas saw him inpatient on 5/24/21 and mentioned medication for vertigo. Patient was dc'd with prednisone, but nothing for the vertigo. Spouse is asking your opinion and possible prescription. Patient also needs a hospital follow up visit this week.  Thank you    [address] [place] [address] BRYNN

## 2021-05-27 ENCOUNTER — TELEPHONE (OUTPATIENT)
Dept: INTERNAL MEDICINE CLINIC | Age: 66
End: 2021-05-27

## 2021-05-27 NOTE — TELEPHONE ENCOUNTER
----- Message from Fred Foote MD sent at 5/27/2021  4:49 PM EDT -----  Contact: [facility] [phone]  Refer to Dr. Heavenly Flores at [facility] ( ENT )   ----- Message -----  From: Mata Burgos MA  Sent: 5/27/2021   4:39 PM EDT  To: Fred Foote MD    Patients wife Little Ropes called asking if they can get a different referral for ENT. The one given to them early today cannot get them in until 6/17/21.

## 2021-05-27 NOTE — DISCHARGE SUMMARY
Name:  Severino Cervantes  Room:  0218/0218-01  MRN:    [id number]    Discharge Summary      This discharge summary is in conjunction with a complete physical exam done on the day of discharge. Attending Physician: JOSSELINE Fitch Discharging Physician: JOSSELINE Fitch Admit: 5/21/2021  Discharge:  5/23/2021    Diagnoses this Admission    Active Problems:    Hypertension, essential    Type 2 diabetes mellitus without complication, without long-term current use of insulin (HCC)    Intractable nausea and vomiting    Vertigo    Acute labyrinthitis, left  Resolved Problems:    * No resolved hospital problems. *          Procedures (Please Review Full Report for Details)    EKG:  I have reviewed the EKG with the following interpretation:   NSR     RADIOLOGY  CT ABDOMEN PELVIS W IV CONTRAST Additional Contrast? None   Final Result   Cholelithiasis.  No obvious cholecystitis by CT scan       Diverticulosis and normal caliber appendix.           CT HEAD WO CONTRAST   Preliminary Result   No evidence of acute intracranial abnormality.             Consults    none    HPI:  The patient is a 77 y.o. male with hypertension, hyperlipidemia, GERD, borderline DM, BPH, and anxiety who presents to [facility] with dizziness, nausea, vomiting. He woke up around 0300 this morning. Reports he was dizzy. He thought his sugar was low. He ate a doughnut stick and is improved a little bit. Any movement makes dizziness worse. He feels improved slightly. He had nausea/vomiting. His wife reports he has inner ear damage and that the patient had complained of tinnitus in his ear.        BP elevated. Labs stable. CT head negative. CT abdomen/pelvis with cholelithiasis, no obvious cholecystitis, diverticulosis and normal caliber appendix. Admitted to med-surg. Hospital Course  Acute labyrinthitis  - severe dizziness with left hearing loss, nystagmus and emesis  - CT head neg.  MRI head with no occular [address], [facility]    Phone: [phone]   · predniSONE 20 MG tablet           Discharge Condition/Location: Stable    Follow Up: Follow up with PCP.         Georgina Guerin MD 5/27/2021 12:40 PM

## 2021-06-02 ENCOUNTER — CARE COORDINATION (OUTPATIENT)
Dept: CASE MANAGEMENT | Age: 66
End: 2021-06-02

## 2021-06-02 NOTE — CARE COORDINATION
Thony 45 Transitions Follow Up Call    2021    Patient: Asa Bumpers  Patient : 1955   MRN: [id number]  Reason for Admission: acute labyrithitis, HTN, GERD, Anxiety, DM2  Discharge Date: 21 RARS: Readmission Risk Score: 11         Spoke with: Anderia Landaverde (spouse) - ok per hipaa    Still with dizziness but improved. Still off balance, but no longer using walker. Taking the meclizine prn and that helps slightly. He is tolerating PO without n/v. Someone is with him  for prn assistance. Has f/up with ENT and audiology 6/15 which was soonest available for scheduling. States he is on wait list for both offices. Denies needs at this time. Care Transitions Subsequent and Final Call    Schedule Follow Up Appointment with PCP: Completed  Subsequent and Final Calls  Do you have any ongoing symptoms?: Yes  Onset of Patient-reported symptoms: Other  Patient-reported symptoms: Other  Interventions for patient-reported symptoms: Other  Have your medications changed?: No  Do you have any questions related to your medications?: No  Do you currently have any active services?: No  Do you have any needs or concerns that I can assist you with?: No  Identified Barriers: Impairment  Care Transitions Interventions  No Identified Needs  Other Interventions:            Follow Up  Future Appointments   Date Time Provider Mariah Montano   6/15/2021 10:00 AM Eunice Shields CLEMARIO AUDIO [facility]   6/15/2021 10:40 AM Rhiannon Bravo MD [facility] ENT [facility]   2021 10:00 AM Noreene Goodpasture, MD [facility] None       Sam Campbell RN  Care Transition Nurse  [phone] mobile

## 2021-06-14 NOTE — PROGRESS NOTES
Lamin Walton   1955, 77 y.o. male   [id number]       Referring Provider: Saintclair Or, MD  Referral Type: In an order in [address] [place] [address]    Reason for Visit: Evaluation of the cause of disorders of hearing, tinnitus, or balance. ADULT AUDIOLOGIC EVALUATION      Lamin Walton is a 77 y.o. male seen today, 6/15/2021 , for an initial audiologic evaluation. Patient was seen by Saintclair Or, MD following today's evaluation. AUDIOLOGIC AND OTHER PERTINENT MEDICAL HISTORY:      Lamin Walton noted onset of dizziness described as room-spinning accompanied by nausea and vomiting that started 1 month ago. Patient reports dizziness has continued; however, is not as severe as initial onset. He notes Emergency Department (ED) visit for dizziness on 5/21/21. Patient reports decreased hearing and worsened tinnitus in the left ear since dizziness onset. He also reports history of occupational noise exposure for 30+ years with known bilateral hearing loss and tinnitus prior to dizziness. Notably, patient states fall with significant head injury several years ago. No additional significant otologic or medical history was reported. Lamin Walton denied otalgia, aural fullness, otorrhea, history of ear surgery and family history of hearing loss. Date: 6/15/2021     IMPRESSIONS:      Today's results revealed an asymmetric sensorineural hearing loss with poor word recognition, bilaterally. Asymmetries > 10 dBHL noted from 250-4000Hz and in word recognition with left ear (0%) worse than right (32%). Hearing loss significant enough to create hearing difficulty in at least some listening situations. Discussed benefits of amplification pending medical clearance due to noted dizziness and asymmetries.  Follow medical recommendations of Saintclair Or, MD.     ASSESSMENT AND FINDINGS:     Otoscopy revealed: Clear ear canals bilaterally    RIGHT EAR:  Hearing Sensitivity: Mild sloping to moderate precipitously sloping to severe to profound sensorineural hearing loss. Speech Recognition Threshold: 45 dB HL  Word Recognition: Very Poor (32%), based on NU-6 25-word list at 95 dBHL (5 dBHL below UCL) using recorded speech stimuli. Tympanometry: Normal peak pressure and compliance, Type A tympanogram, consistent with normal middle ear function. LEFT EAR:  Hearing Sensitivity: Profound sensorineural hearing loss. Speech Detection Threshold: 100 dB HL  Word Recognition: Very Poor (0%), based on NU-6 25-word list at 110 dBHL (limits of audiometer) using recorded speech stimuli. Tympanometry: Normal peak pressure and compliance, Type A tympanogram, consistent with normal middle ear function. Reliability: Good  Transducer: Inserts    **NOTE: Asymmetries > 10 dBHL noted from 250-4000Hz and in word recognition with left ear (0%) worse than right (32%). See scanned audiogram dated 6/15/2021  for results. PATIENT EDUCATION:       The following items were discussed with the patient:    - Good Communication Strategies   - Hearing Loss and Hearing Aids   - Tinnitus Management Strategies   - Noise-Induced Hearing Loss and use of Hearing Protection Devices (HPDs)    - Dizziness    Educational information was shared in the After Visit Summary. RECOMMENDATIONS:                                                                                                                                                                                                                                                          The following items are recommended based on patient report and results from today's appointment:   - Continue medical follow-up with Marcus Golden MD.   - Retest hearing as medically indicated and/or sooner if a change in hearing is noted.    - If desired, schedule a Hearing Aid Evaluation (HAE) appointment to discuss hearing aid options pending medical clearance due to noted dizziness and asymmetries. - Utilize \"Good Communication Strategies\" as discussed to assist in speech understanding with communication partners. - Maintain a sound enriched environment to assist in the management of tinnitus symptoms.    - Use hearing protection devices (HPDs), such as protective ear muffs and ear plugs, when exposed to dangerous sound levels. - If medically indicated, consider vestibular evaluation to further investigate symptoms of dizziness.        Eunice Schroeder  Audiologist    Chart CC'd to: Cynthia Tovar MD      Degree of   Hearing Sensitivity dB Range   Within Normal Limits (WNL) 0 - 20   Mild 20 - 40   Moderate 40 - 55   Moderately-Severe 55 - 70   Severe 70 - 90   Profound 90 +

## 2021-06-15 ENCOUNTER — OFFICE VISIT (OUTPATIENT)
Dept: ENT CLINIC | Age: 66
End: 2021-06-15
Payer: MEDICARE

## 2021-06-15 ENCOUNTER — PROCEDURE VISIT (OUTPATIENT)
Dept: AUDIOLOGY | Age: 66
End: 2021-06-15
Payer: MEDICARE

## 2021-06-15 VITALS
WEIGHT: 233.6 LBS | BODY MASS INDEX: 31.64 KG/M2 | HEIGHT: 72 IN | DIASTOLIC BLOOD PRESSURE: 82 MMHG | HEART RATE: 70 BPM | TEMPERATURE: 97.4 F | SYSTOLIC BLOOD PRESSURE: 133 MMHG

## 2021-06-15 DIAGNOSIS — H91.22 SUDDEN HEARING LOSS, LEFT: ICD-10-CM

## 2021-06-15 DIAGNOSIS — H90.3 SENSORINEURAL HEARING LOSS, BILATERAL: Primary | ICD-10-CM

## 2021-06-15 DIAGNOSIS — H93.13 TINNITUS, BILATERAL: ICD-10-CM

## 2021-06-15 DIAGNOSIS — R42 DIZZINESS: ICD-10-CM

## 2021-06-15 DIAGNOSIS — R42 DIZZINESS: Primary | ICD-10-CM

## 2021-06-15 DIAGNOSIS — H81.22 VESTIBULAR NEURITIS, LEFT: ICD-10-CM

## 2021-06-15 PROCEDURE — G8417 CALC BMI ABV UP PARAM F/U: HCPCS | Performed by: OTOLARYNGOLOGY

## 2021-06-15 PROCEDURE — 99203 OFFICE O/P NEW LOW 30 MIN: CPT | Performed by: OTOLARYNGOLOGY

## 2021-06-15 PROCEDURE — 1123F ACP DISCUSS/DSCN MKR DOCD: CPT | Performed by: OTOLARYNGOLOGY

## 2021-06-15 PROCEDURE — 92567 TYMPANOMETRY: CPT | Performed by: AUDIOLOGIST

## 2021-06-15 PROCEDURE — 1111F DSCHRG MED/CURRENT MED MERGE: CPT | Performed by: OTOLARYNGOLOGY

## 2021-06-15 PROCEDURE — 3017F COLORECTAL CA SCREEN DOC REV: CPT | Performed by: OTOLARYNGOLOGY

## 2021-06-15 PROCEDURE — G8427 DOCREV CUR MEDS BY ELIG CLIN: HCPCS | Performed by: OTOLARYNGOLOGY

## 2021-06-15 PROCEDURE — 1036F TOBACCO NON-USER: CPT | Performed by: OTOLARYNGOLOGY

## 2021-06-15 PROCEDURE — 92557 COMPREHENSIVE HEARING TEST: CPT | Performed by: AUDIOLOGIST

## 2021-06-15 PROCEDURE — 4040F PNEUMOC VAC/ADMIN/RCVD: CPT | Performed by: OTOLARYNGOLOGY

## 2021-06-15 RX ORDER — PREDNISONE 20 MG/1
TABLET ORAL
Qty: 36 TABLET | Refills: 0 | Status: SHIPPED | OUTPATIENT
Start: 2021-06-15 | End: 2021-06-22

## 2021-06-15 NOTE — PROGRESS NOTES
Surgical History:   Procedure Laterality Date    COLONOSCOPY  2006    hemorroids    COLONOSCOPY N/A 7/9/2019    COLONOSCOPY WITH BIOPSY performed by Garrett Little DO at [address] [facility] COLONOSCOPY N/A 7/9/2019    COLONOSCOPY POLYPECTOMY SNARE/COLD BIOPSY performed by Garrett Little DO at [facility]    ENDOSCOPY, COLON, DIAGNOSTIC      THROAT SURGERY  1970's    had tumors    UPPER GASTROINTESTINAL ENDOSCOPY  11/12/2015       Family History     Family History   Problem Relation Age of Onset    Diabetes Mother     Diabetes Father     Cancer Brother         pancreatic       Social History     Social History     Tobacco Use    Smoking status: Never Smoker    Smokeless tobacco: Former User     Types: Chew    Tobacco comment: Quit 5 years ago-smokeless tobacco   Vaping Use    Vaping Use: Never used   Substance Use Topics    Alcohol use: No     Alcohol/week: 0.0 standard drinks     Comment: quit     Drug use: No        Allergies     Allergies   Allergen Reactions    Metformin And Related     Statins      Muscle cramps  Muscle cramps       Medications     Current Outpatient Medications   Medication Sig Dispense Refill    predniSONE (DELTASONE) 20 MG tablet Take 3 tablets by mouth daily for 7 days, THEN 2.5 tablets daily for 2 days, THEN 2 tablets daily for 2 days, THEN 1.5 tablets daily for 2 days, THEN 1 tablet daily for 2 days, THEN 0.5 tablets daily for 2 days. 36 tablet 0    meclizine (ANTIVERT) 25 MG tablet Take 1 tablet by mouth 3 times daily as needed for Dizziness 30 tablet 0    Omega-3 Fatty Acids (FISH OIL PO) Take 1,000 mg by mouth daily       glipiZIDE (GLUCOTROL XL) 2.5 MG extended release tablet Take 1 tablet by mouth daily 90 tablet 0    Blood Glucose Monitoring Suppl (ACCU-CHEK GUIDE ME) w/Device KIT Use to test blood sugars once every morning. DX: E11.9 1 kit 0    blood glucose test strips (ACCU-CHEK GUIDE) strip Use to test blood sugars once every morning. DX: E11.9 100 each 3    Lancets MISC Use to test blood sugars once every morning. DX: E11.9 100 each 3    losartan-hydroCHLOROthiazide (HYZAAR) 100-12.5 MG per tablet Take 1 tablet by mouth once daily 90 tablet 2    lansoprazole (PREVACID) 30 MG delayed release capsule Take 1 capsule by mouth daily 90 capsule 1    metoprolol succinate (TOPROL XL) 100 MG extended release tablet Take 1 tablet by mouth once daily 90 tablet 2    escitalopram (LEXAPRO) 10 MG tablet Take 1 tablet by mouth daily 90 tablet 1    ALPRAZolam (XANAX) 1 MG tablet TAKE 1/2 (ONE-HALF) TABLET BY MOUTH NIGHTLY AS NEEDED FOR SLEEP/ANXIETY 30 tablet 2    aspirin 81 MG tablet Take 1 tablet by mouth daily 30 tablet 0     No current facility-administered medications for this visit.        Review of Systems     REVIEW OF SYSTEMS  The following systems were reviewed and revealed the following in addition to any already discussed in the HPI:    CONSTITUTIONAL: No weight loss, no fever, no night sweats, no chills  EYES: no vision changes, no blurry vision  EARS: + Changes in hearing, no otalgia  NOSE: no epistaxis, no rhinorrhea  RESPIRATORY: No difficulty breathing, no shortness of breath  CV: no chest pain, no peripheral vascular disease  HEME: No coagulation disorder, no bleeding disorder  NEURO: No TIA or stroke-like symptoms  SKIN: No new rashes in the head and neck, no recent skin cancers  MOUTH: No no new ulcers, no recent teeth infections  GASTROINTESTINAL: No diarrhea, stomach pain  PSYCH: No anxiety, no depression    PhysicalExam     Vitals:    06/15/21 1028   BP: 133/82   Site: Left Upper Arm   Position: Sitting   Cuff Size: Large Adult   Pulse: 70   Temp: 97.4 °F (36.3 °C)   TempSrc: Infrared   Weight: 233 lb 9.6 oz (106 kg)   Height: 6' (1.829 m)       PHYSICAL EXAM  /82 (Site: Left Upper Arm, Position: Sitting, Cuff Size: Large Adult)   Pulse 70   Temp 97.4 °F (36.3 °C) (Infrared)   Ht 6' (1.829 m)   Wt 233 lb 9.6 oz (106 kg) BMI 31.68 kg/m²     GENERAL: No Acute Distress, Alert and Oriented, no hoarseness  EYES: EOMI, Anti-icteric  NOSE: On anterior rhinoscopy there is no epistaxis, nasal mucosa within normal limits, no purulent drainage  EARS: Normal external appearance; on portable otomicroscopy:  -Right ear: External auditory canal without stenosis, tympanic membrane clear, no middle ear effusions or retractions  -Left ear: External auditory canal without stenosis, tympanic membrane clear, no middle ear effusions or retractions  -Tuning fork testing: With a 512-Hz tuning fork the Mckay is not appreciated, The Rinne on the right ear the is air>bone conduction, on the left ear bone >air conduction (bone conduction appreciated in the right/contralateral ear)  FACE: 1/6 House-Brackmann Scale, symmetric, sensation equal bilaterally  ORAL CAVITY: No masses or lesions palpated, uvula is midline, moist mucous membranes, 0+ tonsils, absent or dentition for no caries noted  NECK: Normal range of motion, no thyromegaly, trachea is midline, no lymphadenopathy, no neck masses, no crepitus  CHEST: Normal respiratory effort, no retractions, breathing comfortably  SKIN: No rashes, normal appearing skin, no evidence of skin lesions/tumors  NEURO: CN 2, 3, 4, 5, 6, 7, 11, 12 intact bilaterally     Data/Imaging Review            Procedure       Assessment and Plan     1. Dizziness  Likely due to vestibular neuritis, see below  - [facility]    2. Sudden hearing loss, left  Acute hearing loss in the left ear likely due to vestibular neuritisaudiometric testing showing profound hearing loss in the left ear with poor word recognition. Examination including binocular otomicroscopy without any lesions of the external auditory canal, tympanic membrane or middle ear.   He received 2 days of prednisone at high dose however did not improve his hearing; we will start him on high-dose steroids (60 mg x 7 days, then taper) and he will receive a follow-up visit in 7 days with repeat audiometric testingif no improvement will consider transtympanic steroid injection  -Risks and benefits of high-dose steroid therapy including worsened hypertension, closed angle glaucoma, GI disturbance, hyperglycemia, mood changes, avascular necrosis of the hip were discussed with the patient they are in agreement with plan for high-dose oral steroids after explanation of the risks    3. Vestibular neuritis, left  Combination of vertigo, hearing loss in the left ear suspicious for vestibular neuritis, especially given duration of vertigo. Will treat with high-dose steroids, consider transtympanic steroids if no improvement. Risk factors including diabetes, vertigo for recovery of hearing loss. - predniSONE (DELTASONE) 20 MG tablet; Take 3 tablets by mouth daily for 7 days, THEN 2.5 tablets daily for 2 days, THEN 2 tablets daily for 2 days, THEN 1.5 tablets daily for 2 days, THEN 1 tablet daily for 2 days, THEN 0.5 tablets daily for 2 days. Dispense: 36 tablet; Refill: 0  -Cawthorne exercises outlined an given to patient for habituation    Return in about 1 week (around 6/22/2021). Jose Smith MD  [facility]  Department of Otolaryngology/Head and Neck Surgery  6/15/21    I have performed a head and neck physical exam personally or was physically present during the key or critical portions of the service. Medical Decision Making: The following items were considered in medical decision making:  Independent review of images  Review / order clinical lab tests  Review / order radiology tests  Decision to obtain old records  Review and summation of old records as accessed through [facility] (a summary of my findings in these old records: None)     Portions of this note were dictated using Dragon.  There may be linguistic errors secondary to the use of this program.

## 2021-06-15 NOTE — PATIENT INSTRUCTIONS
Instructions for Dean's Head Exercises    Exercises to be carried out for 15 minutes, 2 x's daily. Increasing to 30 minutes twice daily. Eye exercises:  · Looking up, then down- at first slowly, then quickly-20 times. · Looking from one side to the other -at first slowly, then quickly-20 times. · Focus on finger at arm's length, moving finger one foot closer and back again-20 times. Head exercises:  · Bend head forward then backwards with eyes open-slowly, later quickly-20 times. · Turn head from side to side-slowly, then quickly-20 times. · As dizziness decreases, these exercises should be done with eyes closed. Sitting:  · While sitting, shrug shoulders-20 times. · Turn shoulders to right, then to left-20 times. · Bend forward and  object from ground and sit up-20 times. Standing:  · Change from sitting to standing and back again-20 times with eyes open. Repeat with eyes closed. · Throw a rubber ball from hand to hand above eye level. · Throw ball from hand to hand under one knee. Moving About:  · Walk across room with eyes open, then closed-10 times. · Walk up and down a slope with eyes open, then closed-10 times. · Walk up and down steps with eyes open, then closed-10 times. · Any activities involving stooping or turning is good.

## 2021-06-15 NOTE — Clinical Note
Dr. Vasquez Rather,    Thank you for your referral for audiometric testing on this patient. Please see the scanned audiogram (under \"Media\" tab) and encounter note for details. If you have any questions, or if there is anything else you need, please let me know.       Eunice Rg  Audiologist  ---  [address]  [facility] - Audiology

## 2021-06-15 NOTE — PATIENT INSTRUCTIONS
Good Communication Strategies    Communication can be challenging for anyone, but can be especially difficult for those with some degree of hearing loss. While we may not be able to control every factor that may lead to difficulty with communication, there are Good Communication Strategies that we can all use in our day-to-day lives. Communication takes both parties working together for it to be successful. Tips as a Listener:   1. Control your environment. It is important to limit the amount of background noise in the room when possible. You should also consider having a good light source in the room to best see the other person. 2. Ask for clarification. Instead of saying \"What?\", you can use parts of what you heard to make a new question. For example, if you heard the word \"Thursday\" but not the rest of the week, you may ask \"What was that about Thursday? \" or \"What did you want to do Thursday? \". This shows the person talking that you are listening and will help them better explain what they are saying. 3. Be an advocate for yourself. If you are hearing but not understanding, tell the other person \"I can hear you, but I need you to slow down when you speak. \"  Or if someone is facing the other direction, say \"I cannot hear you when you are not looking at me when we talk. \"       Tips as a Talker:   - Sit or stand 3 to 6 feet away to maximize audibility         -- It is unrealistic to believe someone else will fully hear your message if you are speaking from across the room or in a different room in the house   - Stay at eye level to help with visual cues   - Make sure you have the persons attention before speaking   - Use facial expressions and gestures to accentuate your message   - Raise your voice slightly (do not scream)   - Speak slowly and distinctly   - Use short, simple sentences   - Rephrase your words if the person is having a hard time understanding you    - To avoid distortion, dont speak directly into a persons ear      Some additional items that may be helpful:   - Remain patient - this is important for both parties   - Write down items that still cannot be heard/understood. You may write with pen/paper or consider typing/texting on a cell phone or smart device. - If background noise is unavoidable, try to keep yourself in a good position in the room. By sitting at a [place]ork on the side of the restaurant (preferably a corner), it will be easier to communicate than if you were sitting at a table in the middle with background noise surrounding you. Keep yourself positioned away from music speakers or heavy foot traffic. Tinnitus: Overview and Management Strategies          Many people have some ringing sounds in their ears once in a while. You may hear a roar, a hiss, a tinkle, or a buzz. The sound usually lasts only a few minutes. If it goes on all the time, you may have tinnitus. Tinnitus is usually caused by long-term exposure to loud noise. This damages the nerves in the inner ear. It can occur with all types of hearing loss. It may be a symptom of almost any ear problem. Tinnitus may be caused by a buildup of earwax. Or, it may be caused by ear infections or certain medicines (especially antibiotics or large amounts of aspirin). You can also hear noises in your ears because of an injury to the ears, drinking too much alcohol or caffeine, or a medical condition. Other conditions may also contribute to tinnitus, including: head and neck trauma, temporomandibular joint disorder (TMJ), sinus pressure and barometric trauma, traumatic brain injury, metabolic disorders, autoimmune disorders, stress, and high blood pressure. You may need tests to evaluate your hearing and to find causes of long-lasting tinnitus. Your doctor may suggest one or more treatments to help you cope with the tinnitus. You can also do things at home to help reduce symptoms.     Follow-up care is a key part of your treatment and safety. Be sure to make and go to all appointments, and call your doctor if you are having problems. It's also a good idea to know your test results and keep a list of the medicines you take. How can you care for yourself at home? · Limit or cut out alcohol, caffeine, and sodium. They can make your symptoms worse. · Do not smoke or use other tobacco products. Nicotine reduces blood flow to the ear and makes tinnitus worse. If you need help quitting, talk to your doctor about stop-smoking programs and medicines. These can increase your chances of quitting for good. · Talk to your doctor about whether to stop taking aspirin and similar products such as ibuprofen or naproxen. · Get exercise often. It can help improve blood flow to the ear. Ways to manage/cope with tinnitus  Some tinnitus may last a long time. To manage your tinnitus, try to:  · Avoid noises that you think caused your tinnitus. If you can't avoid loud noises, wear earplugs or earmuffs. · Ignore the sound by paying attention to other things. Keeping your brain busy with other tasks or background noise can help your brain not focus on the tinnitus. · Try to not give the tinnitus an emotional reaction. Do your best to ignore the sound and not let it bother you. Relax using biofeedback, meditation, or yoga. Feeling stressed and being tired can make tinnitus worse. · Play music or white noise to help you sleep. Background noise may cover up the noise that you hear in your ears. You can buy a tabletop machine or a device that sits under your pillow to play soothing sounds, like ocean waves. · Smart phones have free apps, such as Whist, Relax Melodies, ReSound Relief, and White Noise Lite. These apps have different types of sounds/noise, some of which you can blend together to find sounds that are most soothing to you.   · Hearing aid technology, especially when there is some hearing loss, may help reduce tinnitus symptoms by giving your brain better access to the sounds it is missing. There are some hearing aids with built-in noise generator programs, which may help when amplification alone is not enough. Additional resources may be found through the [employer]inn[employer] Association at www.moo.org    When should you call for help? Call 911 anytime you think you may need emergency care. For example, call if:    · You have symptoms of a stroke. These may include:  ? Sudden numbness, tingling, weakness, or loss of movement in your face, arm, or leg, especially on only one side of your body. ? Sudden vision changes. ? Sudden trouble speaking. ? Sudden confusion or trouble understanding simple statements. ? Sudden problems with walking or balance. ? A sudden, severe headache that is different from past headaches. Call your doctor now or seek immediate medical care if:    · You develop other symptoms. These may include hearing loss (or worse hearing loss), balance problems, dizziness, nausea, or vomiting. Watch closely for changes in your health, and be sure to contact your doctor if:    · Your tinnitus moves from both ears to one ear. · Your hearing loss gets worse within 1 day after an ear injury. · Your tinnitus or hearing loss does not get better within 1 week after an ear injury. · Your tinnitus bothers you enough that you want to take medicines to help you cope with it. If you notice changes in your tinnitus and/or your hearing, it is recommended that you have your hearing tested by your audiologist and to follow-up with your physician that manages your hearing loss (such as your ENT or Primary Care doctor). Hearing Loss: Care Instructions  Your Care Instructions      Hearing loss is a sudden or slow decrease in how well you hear. It can range from mild to profound. Permanent hearing loss can occur with aging, and it can happen when you are exposed long-term to loud noise.  Examples instead of talking or listening. These devices are also called TDD. When messages are typed on the keyboard, they are sent over the phone line to a receiving TTY. The message is shown on a monitor. · Use pagers, fax machines, text, and email if it is hard for you to communicate by telephone. · Try to learn a listening technique called speech-reading. It is not lip-reading. You pay attention to people's gestures, expressions, posture, and tone of voice. These clues can help you understand what a person is saying. Face the person you are talking to, and have him or her face you. Make sure the lighting is good. You need to see the other person's face clearly. · Think about counseling if you need help to adjust to your hearing loss. When should you call for help? Watch closely for changes in your health, and be sure to contact your doctor if:    · You think your hearing is getting worse. · You have new symptoms, such as dizziness or nausea. Noise-Induced Hearing Loss  What it is, and what you can do to prevent it      Exposure to loud sounds, in an occupational setting or recreational, can cause permanent hearing loss. Sound is measured in decibels (dB). Noise-induced hearing loss is the ONLY type of preventable hearing loss. Hearing loss related to noise exposure can occur at any age. There are small sensory cells, called inner and outer hair cells, within the inner ear (cochlea). These cells process the loudness (intensity) and pitch (frequency) of sound and send the signal to the brain via our auditory nerve (vestibulocochlear nerve, cranial nerve VIII). When these cells are damaged, they can result in permanent hearing loss and/or tinnitus. The hair cells responsible for high frequency sounds, like birds chirping, are most likely to be damaged due to loud sounds. The high frequency sounds are also very important for our clarity and understanding of speech.       OCCUPATIONAL NOISE EXPOSURE RECREATIONAL NOISE EXPOSURE   Some jobs may have exposure to loud sounds in the workplace. These jobs may include but are not limited to:  [employer]   Construction   Welding   Landscaping   Hairdressing/hairstyling   Musicians  [employer]    ... And more! Many activities outside of work may cause permanent hearing loss. These activities may include but are not limited to:  Lawnmowers, leaf blowers  [employer] (such as pigs squealing)   Chainsaws and other power tools  [employer] musical instruments and/or singing   Listening to music too loudly - at concerts, through stereo, through ear buds or headphones   Attending sporting events   Attending fireworks shows or using fireworks at home  [employer] of firearms   . .. And more! REDUCE OR PROTECT YOUR EARS FROM NOISE EXPOSURE    To do your best to avoid noise-induced hearing loss, here are some tips:   Limit exposure to loud sounds. 85 dB (decibels) is safe for 8 hours. As sounds are louder, the length of time the sound is safe lessens. These numbers are cumulative across a 24-hour period. (NIOSH and CDC, 2002)  o 85 dB is safe for 8 hours  o 88 dB is safe for 4 hours  o 91 dB is safe for 2 hours  o 94 dB is safe for 1 hour  o 97 dB is safe for 30 minutes  o 100 dB is safe for 15 minutes  o 103 dB is safe for 7.5 minutes  o 106 dB is safe for 3.75 minutes  o 109 dB is safe for LESS THAN 2 minutes  o 112 dB is safe for LESS THAN 1 minute  o 115 dB is safe for ~ 30 seconds  o 130 dB can cause IMMEDIATE hearing loss   If you are unsure if a sound is too loud, consider checking the sound level with a \"sound level meter\". There are apps on smart devices that can measure the loudness of the sound. They are not as accurate as expensive equipment used by scientists, but it will give you a guesstimate of how loud the sound is, and if it may be damaging to your hearing.    If you cannot avoid loud sounds, here are ways to reduce your exposure:  o 1. Wear hearing protection  - Ear plugs and protective ear muffs can be used to reduce the intensity of the sound. The higher the NRR (noise reduction rating), the better reduction of the intensity of the sound   o 2. Turn the volume down  - When listening to music, turn the volume down, especially when wearing ear buds or headphones. A good rule of thumb is to not go beyond the middle setting on your device. If you can't hear someone talking to you from arm's length away, your music may be at a level that it can cause damage. If someone else can hear your music from 3 feet away, it may also be at a level that it can cause damage. o 3. Walk away from the sound  - If you do not have the ability to wear hearing protection or turn down the volume of the sound, you should do your best to move away from the source of the sound. - Sound decreases in intensity as we move further from the source. The sound will decrease by 6 dB for every doubling of distance from the sound source. Exposure to these sounds may cause permanent damage to your hearing. If you suspect your hearing has changed, it is recommended that you have your hearing tested by your audiologist.    Dizziness: Care Instructions  Your Care Instructions  Dizziness is the feeling of unsteadiness or fuzziness in your head. It is different than having vertigo, which is a feeling that the room is spinning or that you are moving or falling. It is also different from lightheadedness, which is the feeling that you are about to faint. It can be hard to know what causes dizziness. Some people feel dizzy when they have migraine headaches. Sometimes bouts of flu can make you feel dizzy. Some medical conditions, such as heart problems or high blood pressure, can make you feel dizzy. Many medicines can cause dizziness, including medicines for high blood pressure, pain, or anxiety.     If a

## 2021-06-16 ENCOUNTER — CARE COORDINATION (OUTPATIENT)
Dept: CASE MANAGEMENT | Age: 66
End: 2021-06-16

## 2021-06-16 ENCOUNTER — TELEPHONE (OUTPATIENT)
Dept: INTERNAL MEDICINE CLINIC | Age: 66
End: 2021-06-16

## 2021-06-16 NOTE — TELEPHONE ENCOUNTER
----- Message from Phillip Gonzalez MD sent at 6/16/2021 12:37 PM EDT -----  Contact: Mireya Henderson [phone]  Then increase to full tab in am, half pm  ----- Message -----  From: Tosha Cass  Sent: 6/16/2021  11:41 AM EDT  To: Phillip Gonzalez MD    Pt spouse states pt has already been taking glipizide half pill bid. Please advise.  ----- Message -----  From: Phillip Gonzalez MD  Sent: 6/16/2021  11:33 AM EDT  To: Gabriel Ballesteros    Take glipizide twice daily while on steroids  Can back down to once as prednisone is completed  ----- Message -----  From: Eufemia Calderon MA  Sent: 6/16/2021  11:18 AM EDT  To: Phillip Gonzalez MD    Patients wife Zhao Jean called stating patients sugar has been running high since taking the prednisone the ENT prescribed him. Patients wife is asking if he should stop the medication. Patients sugar was 250.  Thank you

## 2021-06-16 NOTE — CARE COORDINATION
[facility] Transitions Follow Up Call    2021    Patient: Nat Leiva  Patient : 1955   MRN: [id number]  Reason for Admission: acute labyrithitis, HTN, GERD, Anxiety, DM2  Discharge Date: 21 RARS: Readmission Risk Score: 11         Spoke with: Bruno Lopes (spouse) - ok per HIPAA    Completed OV with both audiology and otolaryngology. Started on high dose prednisone (60mg daily x 7 days then taper down 2 days at a time). 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 They were educated on SE of prednisone including hyperglycemia. His baseline BS 90-120s. They will monitor BS closely and communicate with PCP if elevated >150 for recommendation. He still has dizziness and balance issues. No falls or injuries thankfully. Ambulating in the home without the use of walker. Was given HEP also for home and he will work on that. Denies needs/concerns at this time. [facility] contact info provided for prn needs/concerns. Care Transitions Subsequent and Final Call    Schedule Follow Up Appointment with PCP: Completed  Subsequent and Final Calls  Do you have any ongoing symptoms?: Yes  Onset of Patient-reported symptoms: Other  Patient-reported symptoms: Other  Interventions for patient-reported symptoms: Other  Have your medications changed?: Yes  Do you have any questions related to your medications?: No  Do you currently have any active services?: No  Do you have any needs or concerns that I can assist you with?: No  Identified Barriers: Impairment  Care Transitions Interventions  No Identified Needs  Other Interventions:            Follow Up  Future Appointments   Date Time Provider Mariah Montano   2021 11:30 AM Eunice Maloney CLE[facility] AUDIO [facility]   2021 12:00 PM William Fierro MD [facility] ENT [facility]   2021 10:00 AM Joya Nation MD [facility] None       Bhanu Rogers RN

## 2021-06-22 ENCOUNTER — OFFICE VISIT (OUTPATIENT)
Dept: ENT CLINIC | Age: 66
End: 2021-06-22
Payer: MEDICARE

## 2021-06-22 ENCOUNTER — PROCEDURE VISIT (OUTPATIENT)
Dept: AUDIOLOGY | Age: 66
End: 2021-06-22
Payer: MEDICARE

## 2021-06-22 VITALS — HEIGHT: 72 IN | WEIGHT: 233 LBS | BODY MASS INDEX: 31.56 KG/M2 | TEMPERATURE: 97.2 F

## 2021-06-22 DIAGNOSIS — H90.3 SENSORINEURAL HEARING LOSS, BILATERAL: Primary | ICD-10-CM

## 2021-06-22 DIAGNOSIS — H91.22 SUDDEN HEARING LOSS, LEFT: Primary | ICD-10-CM

## 2021-06-22 DIAGNOSIS — R42 DIZZINESS: ICD-10-CM

## 2021-06-22 DIAGNOSIS — H93.13 TINNITUS, BILATERAL: ICD-10-CM

## 2021-06-22 PROCEDURE — 1111F DSCHRG MED/CURRENT MED MERGE: CPT | Performed by: OTOLARYNGOLOGY

## 2021-06-22 PROCEDURE — 1036F TOBACCO NON-USER: CPT | Performed by: OTOLARYNGOLOGY

## 2021-06-22 PROCEDURE — 99212 OFFICE O/P EST SF 10 MIN: CPT | Performed by: OTOLARYNGOLOGY

## 2021-06-22 PROCEDURE — 92567 TYMPANOMETRY: CPT | Performed by: AUDIOLOGIST

## 2021-06-22 PROCEDURE — G8427 DOCREV CUR MEDS BY ELIG CLIN: HCPCS | Performed by: OTOLARYNGOLOGY

## 2021-06-22 PROCEDURE — 4040F PNEUMOC VAC/ADMIN/RCVD: CPT | Performed by: OTOLARYNGOLOGY

## 2021-06-22 PROCEDURE — 1123F ACP DISCUSS/DSCN MKR DOCD: CPT | Performed by: OTOLARYNGOLOGY

## 2021-06-22 PROCEDURE — 92557 COMPREHENSIVE HEARING TEST: CPT | Performed by: AUDIOLOGIST

## 2021-06-22 PROCEDURE — G8417 CALC BMI ABV UP PARAM F/U: HCPCS | Performed by: OTOLARYNGOLOGY

## 2021-06-22 PROCEDURE — 3017F COLORECTAL CA SCREEN DOC REV: CPT | Performed by: OTOLARYNGOLOGY

## 2021-06-22 RX ORDER — PREDNISONE 20 MG/1
TABLET ORAL
Qty: 36 TABLET | Refills: 0 | Status: SHIPPED | OUTPATIENT
Start: 2021-06-22 | End: 2021-07-09

## 2021-06-22 NOTE — PATIENT INSTRUCTIONS
Good Communication Strategies    Communication can be challenging for anyone, but can be especially difficult for those with some degree of hearing loss. While we may not be able to control every factor that may lead to difficulty with communication, there are Good Communication Strategies that we can all use in our day-to-day lives. Communication takes both parties working together for it to be successful. Tips as a Listener:   1. Control your environment. It is important to limit the amount of background noise in the room when possible. You should also consider having a good light source in the room to best see the other person. 2. Ask for clarification. Instead of saying \"What?\", you can use parts of what you heard to make a new question. For example, if you heard the word \"Thursday\" but not the rest of the week, you may ask \"What was that about Thursday? \" or \"What did you want to do Thursday? \". This shows the person talking that you are listening and will help them better explain what they are saying. 3. Be an advocate for yourself. If you are hearing but not understanding, tell the other person \"I can hear you, but I need you to slow down when you speak. \"  Or if someone is facing the other direction, say \"I cannot hear you when you are not looking at me when we talk. \"       Tips as a Talker:   - Sit or stand 3 to 6 feet away to maximize audibility         -- It is unrealistic to believe someone else will fully hear your message if you are speaking from across the room or in a different room in the house   - Stay at eye level to help with visual cues   - Make sure you have the persons attention before speaking   - Use facial expressions and gestures to accentuate your message   - Raise your voice slightly (do not scream)   - Speak slowly and distinctly   - Use short, simple sentences   - Rephrase your words if the person is having a hard time understanding you    - To avoid distortion, dont speak directly into a persons ear      Some additional items that may be helpful:   - Remain patient - this is important for both parties   - Write down items that still cannot be heard/understood. You may write with pen/paper or consider typing/texting on a cell phone or smart device. - If background noise is unavoidable, try to keep yourself in a good position in the room. By sitting at a [place]ork on the side of the restaurant (preferably a corner), it will be easier to communicate than if you were sitting at a table in the middle with background noise surrounding you. Keep yourself positioned away from music speakers or heavy foot traffic. Tinnitus: Overview and Management Strategies          Many people have some ringing sounds in their ears once in a while. You may hear a roar, a hiss, a tinkle, or a buzz. The sound usually lasts only a few minutes. If it goes on all the time, you may have tinnitus. Tinnitus is usually caused by long-term exposure to loud noise. This damages the nerves in the inner ear. It can occur with all types of hearing loss. It may be a symptom of almost any ear problem. Tinnitus may be caused by a buildup of earwax. Or, it may be caused by ear infections or certain medicines (especially antibiotics or large amounts of aspirin). You can also hear noises in your ears because of an injury to the ears, drinking too much alcohol or caffeine, or a medical condition. Other conditions may also contribute to tinnitus, including: head and neck trauma, temporomandibular joint disorder (TMJ), sinus pressure and barometric trauma, traumatic brain injury, metabolic disorders, autoimmune disorders, stress, and high blood pressure. You may need tests to evaluate your hearing and to find causes of long-lasting tinnitus. Your doctor may suggest one or more treatments to help you cope with the tinnitus. You can also do things at home to help reduce symptoms.     Follow-up care is a key part of your treatment and safety. Be sure to make and go to all appointments, and call your doctor if you are having problems. It's also a good idea to know your test results and keep a list of the medicines you take. How can you care for yourself at home? · Limit or cut out alcohol, caffeine, and sodium. They can make your symptoms worse. · Do not smoke or use other tobacco products. Nicotine reduces blood flow to the ear and makes tinnitus worse. If you need help quitting, talk to your doctor about stop-smoking programs and medicines. These can increase your chances of quitting for good. · Talk to your doctor about whether to stop taking aspirin and similar products such as ibuprofen or naproxen. · Get exercise often. It can help improve blood flow to the ear. Ways to manage/cope with tinnitus  Some tinnitus may last a long time. To manage your tinnitus, try to:  · Avoid noises that you think caused your tinnitus. If you can't avoid loud noises, wear earplugs or earmuffs. · Ignore the sound by paying attention to other things. Keeping your brain busy with other tasks or background noise can help your brain not focus on the tinnitus. · Try to not give the tinnitus an emotional reaction. Do your best to ignore the sound and not let it bother you. Relax using biofeedback, meditation, or yoga. Feeling stressed and being tired can make tinnitus worse. · Play music or white noise to help you sleep. Background noise may cover up the noise that you hear in your ears. You can buy a tabletop machine or a device that sits under your pillow to play soothing sounds, like ocean waves. · Smart phones have free apps, such as Whist, Relax Melodies, ReSound Relief, and White Noise Lite. These apps have different types of sounds/noise, some of which you can blend together to find sounds that are most soothing to you.   · Hearing aid technology, especially when there is some hearing loss, may help reduce tinnitus include listening to loud music, riding motorcycles, or being around other loud machines. Hearing loss can affect your work and home life. It can make you feel lonely or depressed. You may feel that you have lost your independence. But hearing aids and other devices can help you hear better and feel connected to others. Follow-up care is a key part of your treatment and safety. Be sure to make and go to all appointments, and call your doctor if you are having problems. It's also a good idea to know your test results and keep a list of the medicines you take. How can you care for yourself at home? · Avoid loud noises whenever possible. This helps keep your hearing from getting worse. Always wear hearing protection around loud noises. · If appropriate, wear hearing aid(s) as directed. It is recommended that hearing aids are worn during all waking hours to keep your brain active and give it access to the sounds it is missing. · If you are beginning your process with hearing aid(s), schedule a \"Hearing Aid Evaluation\" with an audiologist to discuss your lifestyle, features of hearing aid technology, and styles of hearing aids available. It is recommended that you contact your insurance company to determine if you have a hearing aid benefit, as this may dictate who you can see for these services. · Have hearing tests as your doctor suggests. They can show whether your hearing has changed. Your hearing aid may need to be adjusted. · Use other assistive devices as needed. These may include:  ? Telephone amplifiers and hearing aids that can connect to a television, stereo, radio, or microphone. ? Devices that use lights or vibrations. These alert you to the doorbell, a ringing telephone, or a baby monitor. ? Television closed-captioning. This shows the words at the bottom of the screen. Most new TVs can do this. ? TTY (text telephone).  This lets you type messages back and forth on the telephone instead of talking or listening. These devices are also called TDD. When messages are typed on the keyboard, they are sent over the phone line to a receiving TTY. The message is shown on a monitor. · Use pagers, fax machines, text, and email if it is hard for you to communicate by telephone. · Try to learn a listening technique called speech-reading. It is not lip-reading. You pay attention to people's gestures, expressions, posture, and tone of voice. These clues can help you understand what a person is saying. Face the person you are talking to, and have him or her face you. Make sure the lighting is good. You need to see the other person's face clearly. · Think about counseling if you need help to adjust to your hearing loss. When should you call for help? Watch closely for changes in your health, and be sure to contact your doctor if:    · You think your hearing is getting worse. · You have new symptoms, such as dizziness or nausea. Noise-Induced Hearing Loss  What it is, and what you can do to prevent it      Exposure to loud sounds, in an occupational setting or recreational, can cause permanent hearing loss. Sound is measured in decibels (dB). Noise-induced hearing loss is the ONLY type of preventable hearing loss. Hearing loss related to noise exposure can occur at any age. There are small sensory cells, called inner and outer hair cells, within the inner ear (cochlea). These cells process the loudness (intensity) and pitch (frequency) of sound and send the signal to the brain via our auditory nerve (vestibulocochlear nerve, cranial nerve VIII). When these cells are damaged, they can result in permanent hearing loss and/or tinnitus. The hair cells responsible for high frequency sounds, like birds chirping, are most likely to be damaged due to loud sounds. The high frequency sounds are also very important for our clarity and understanding of speech.       OCCUPATIONAL NOISE EXPOSURE RECREATIONAL NOISE EXPOSURE   Some jobs may have exposure to loud sounds in the workplace. These jobs may include but are not limited to:  [employer]   Construction   Welding   Landscaping   Hairdressing/hairstyling   Musicians  [employer]    ... And more! Many activities outside of work may cause permanent hearing loss. These activities may include but are not limited to:  Lawnmowers, leaf blowers  [employer] (such as pigs squealing)   Chainsaws and other power tools  [employer] musical instruments and/or singing   Listening to music too loudly - at concerts, through stereo, through ear buds or headphones   Attending sporting events   Attending fireworks shows or using fireworks at home  [employer] of firearms   . .. And more! REDUCE OR PROTECT YOUR EARS FROM NOISE EXPOSURE    To do your best to avoid noise-induced hearing loss, here are some tips:   Limit exposure to loud sounds. 85 dB (decibels) is safe for 8 hours. As sounds are louder, the length of time the sound is safe lessens. These numbers are cumulative across a 24-hour period. (NIOSH and CDC, 2002)  o 85 dB is safe for 8 hours  o 88 dB is safe for 4 hours  o 91 dB is safe for 2 hours  o 94 dB is safe for 1 hour  o 97 dB is safe for 30 minutes  o 100 dB is safe for 15 minutes  o 103 dB is safe for 7.5 minutes  o 106 dB is safe for 3.75 minutes  o 109 dB is safe for LESS THAN 2 minutes  o 112 dB is safe for LESS THAN 1 minute  o 115 dB is safe for ~ 30 seconds  o 130 dB can cause IMMEDIATE hearing loss   If you are unsure if a sound is too loud, consider checking the sound level with a \"sound level meter\". There are apps on smart devices that can measure the loudness of the sound. They are not as accurate as expensive equipment used by scientists, but it will give you a guesstimate of how loud the sound is, and if it may be damaging to your hearing.    If you medicine causes your symptoms, your doctor may recommend that you stop or change the medicine. If it is a problem with your heart, you may need medicine to help your heart work better. If there is no clear reason for your symptoms, your doctor may suggest watching and waiting for a while to see if the dizziness goes away on its own. Follow-up care is a key part of your treatment and safety. Be sure to make and go to all appointments, and call your doctor if you are having problems. It's also a good idea to know your test results and keep a list of the medicines you take. How can you care for yourself at home? · If your doctor recommends or prescribes medicine, take it exactly as directed. Call your doctor if you think you are having a problem with your medicine. · Do not drive while you feel dizzy. · Try to prevent falls. Steps you can take include:  ? Using nonskid mats, adding grab bars near the tub, and using night-lights. ? Clearing your home so that walkways are free of anything you might trip on.  ? Letting family and friends know that you have been feeling dizzy. This will help them know how to help you. When should you call for help? Call 911 anytime you think you may need emergency care. For example, call if:    · You passed out (lost consciousness). · You have dizziness along with symptoms of a heart attack. These may include:  ? Chest pain or pressure, or a strange feeling in the chest.  ? Sweating. ? Shortness of breath. ? Nausea or vomiting. ? Pain, pressure, or a strange feeling in the back, neck, jaw, or upper belly or in one or both shoulders or arms. ? Lightheadedness or sudden weakness. ? A fast or irregular heartbeat. · You have symptoms of a stroke. These may include:  ? Sudden numbness, tingling, weakness, or loss of movement in your face, arm, or leg, especially on only one side of your body. ? Sudden vision changes. ? Sudden trouble speaking.   ? Sudden confusion or trouble understanding simple statements. ? Sudden problems with walking or balance. ? A sudden, severe headache that is different from past headaches. Call your doctor now or seek immediate medical care if:    · You feel dizzy and have a fever, headache, or ringing in your ears. · You have new or increased nausea and vomiting. · Your dizziness does not go away or comes back. Watch closely for changes in your health, and be sure to contact your doctor if:    · You do not get better as expected. Where can you learn more? Go to https://D[employer]. org and sign in to your [employer] account. Enter I531 in the [place]agara box to learn more about \"Dizziness: Care Instructions. \"     If you do not have an account, please click on the \"Sign Up Now\" link. Current as of: September 23, 2018  Content Version: 11.9  © 8809-1022 [employer]. Care instructions adapted under license by [facility] ([facility]). If you have questions about a medical condition or this instruction, always ask your healthcare professional. [address] any warranty or liability for your use of this information.

## 2021-06-22 NOTE — PROGRESS NOTES
[address]  FOLLOW UP NOTE      Patient Name: Dora Edmondson [address] Record Number:  [id number]  Primary Care Physician:  Cynthia Owusu MD    ChiefComplaint     Chief Complaint   Patient presents with    Follow-up     No changes since last visit, pts wife states pt has complained of ears feeling plugged up last couple of days       History of Present Illness     Betty Lopez is an 77 y.o. male with an episode of vertigo and hearing loss in the left ear on 5/21/2021; symptoms lasted several days, and he had significant dysequilibrium for 7-10 days thereafter. He was started on prednisone (60 mg x 2 days, then taper) however did not find improvement in hearing, and received MRI IAC with and without contrast no CPA lesions identified (we have personally reviewed the images). Since that time he states continued disequilibrium, particularly when he performs exercises that stresses bowel such as turning quickly or bending over and sitting up, however he does not have richie vertigo, or nausea with these episodes. He is able to fish on a boat, drive a car without issue. Continues to have left ear hearing loss (near complete) along with \"static\" (tinnitus) in the left ear. Believes hearing in the right ear is at baseline. 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 Denies otorrhea, otalgia. Interval history 6/22/2021: Significant hunger with oral steroids, else no side effects. Vertigo but continues to have imbalance, left ear hearing has not improved subjectively.     Past Medical History     Past Medical History:   Diagnosis Date    Anxiety     Bursitis of elbow     right    Coronary artery spasm (HCC)     Diabetes mellitus (HCC)     borderline    GERD (gastroesophageal reflux disease)     Hyperlipidemia     Hypertension     Hypertrophy and Oriented, no hoarseness  EYES: EOMI, Anti-icteric  NOSE: On anterior rhinoscopy there is no epistaxis, nasal mucosa within normal limits, no purulent drainage  EARS: Normal external appearance; on portable otomicroscopy:  -Right ear: External auditory canal without stenosis, tympanic membrane clear, no middle ear effusions or retractions  -Left ear: External auditory canal without stenosis, tympanic membrane clear, no middle ear effusions or retractions  -Tuning fork testing: With a 512-Hz tuning fork the Mckay is not appreciated, The Rinne on the right ear the is air>bone conduction, on the left ear bone >air conduction (bone conduction appreciated in the right/contralateral ear)  FACE: 1/6 House-Brackmann Scale, symmetric, sensation equal bilaterally  ORAL CAVITY: No masses or lesions palpated, uvula is midline, moist mucous membranes, 0+ tonsils, absent or dentition for no caries noted  NECK: Normal range of motion, no thyromegaly, trachea is midline, no lymphadenopathy, no neck masses, no crepitus  CHEST: Normal respiratory effort, no retractions, breathing comfortably  SKIN: No rashes, normal appearing skin, no evidence of skin lesions/tumors  NEURO: CN 2, 3, 4, 5, 6, 7, 11, 12 intact bilaterally     Data/Imaging Review             Procedure       Assessment and Plan     1. Dizziness  Likely due to vestibular neuritis, see below  - [facility]    2. Sudden hearing loss, left  Acute hearing loss in the left ear likely due to vestibular neuritisaudiometric testing showing profound hearing loss in the left ear with poor word recognition. Examination including binocular otomicroscopy without any lesions of the external auditory canal, tympanic membrane or middle ear. Since starting oral steroids he has had 20dB improvement in the left ear, however he continues to have poor word recognition.   Given the above, we discussed further interventions including transtympanic dexamethasone, further course

## 2021-06-23 ENCOUNTER — TELEPHONE (OUTPATIENT)
Dept: INTERNAL MEDICINE CLINIC | Age: 66
End: 2021-06-23

## 2021-06-23 NOTE — TELEPHONE ENCOUNTER
----- Message from Pola Layne MD sent at 6/22/2021  1:58 PM EDT -----  Contact: Anh Madsen [phone]  Call him  ----- Message -----  From: Kateryna Trinh MA  Sent: 6/22/2021  12:23 PM EDT  To: Pola Layne MD    Patient and his wife came into office wanting to speak with PCP. Patient seen a ENT inside our building today and the physician wants to do a procedure that involves a needle going into patients eardrum. Patient is looking to speak with PCP for reassurance about procedure.  Thank you

## 2021-07-06 ENCOUNTER — PROCEDURE VISIT (OUTPATIENT)
Dept: ENT CLINIC | Age: 66
End: 2021-07-06
Payer: MEDICARE

## 2021-07-06 ENCOUNTER — PROCEDURE VISIT (OUTPATIENT)
Dept: AUDIOLOGY | Age: 66
End: 2021-07-06
Payer: MEDICARE

## 2021-07-06 VITALS — HEIGHT: 72 IN | TEMPERATURE: 97.2 F | WEIGHT: 230 LBS | BODY MASS INDEX: 31.15 KG/M2

## 2021-07-06 DIAGNOSIS — H90.3 SENSORINEURAL HEARING LOSS, BILATERAL: Primary | ICD-10-CM

## 2021-07-06 DIAGNOSIS — H93.8X3 EAR CONGESTION, BILATERAL: ICD-10-CM

## 2021-07-06 DIAGNOSIS — H93.13 TINNITUS, BILATERAL: ICD-10-CM

## 2021-07-06 DIAGNOSIS — R42 DIZZINESS: ICD-10-CM

## 2021-07-06 DIAGNOSIS — H91.22 SUDDEN HEARING LOSS, LEFT: Primary | ICD-10-CM

## 2021-07-06 DIAGNOSIS — H81.22 VESTIBULAR NEURONITIS OF LEFT EAR: ICD-10-CM

## 2021-07-06 PROCEDURE — 92567 TYMPANOMETRY: CPT | Performed by: AUDIOLOGIST

## 2021-07-06 PROCEDURE — G8417 CALC BMI ABV UP PARAM F/U: HCPCS | Performed by: OTOLARYNGOLOGY

## 2021-07-06 PROCEDURE — 3017F COLORECTAL CA SCREEN DOC REV: CPT | Performed by: OTOLARYNGOLOGY

## 2021-07-06 PROCEDURE — 1036F TOBACCO NON-USER: CPT | Performed by: OTOLARYNGOLOGY

## 2021-07-06 PROCEDURE — G8427 DOCREV CUR MEDS BY ELIG CLIN: HCPCS | Performed by: OTOLARYNGOLOGY

## 2021-07-06 PROCEDURE — 4040F PNEUMOC VAC/ADMIN/RCVD: CPT | Performed by: OTOLARYNGOLOGY

## 2021-07-06 PROCEDURE — 92557 COMPREHENSIVE HEARING TEST: CPT | Performed by: AUDIOLOGIST

## 2021-07-06 PROCEDURE — 99212 OFFICE O/P EST SF 10 MIN: CPT | Performed by: OTOLARYNGOLOGY

## 2021-07-06 PROCEDURE — 1123F ACP DISCUSS/DSCN MKR DOCD: CPT | Performed by: OTOLARYNGOLOGY

## 2021-07-06 RX ORDER — FLUTICASONE PROPIONATE 50 MCG
1 SPRAY, SUSPENSION (ML) NASAL 2 TIMES DAILY
Qty: 1 BOTTLE | Refills: 2 | Status: SHIPPED | OUTPATIENT
Start: 2021-07-06 | End: 2021-12-06 | Stop reason: ALTCHOICE

## 2021-07-06 NOTE — PATIENT INSTRUCTIONS
-Start nose sprays (flonase) twice a day (morning and evening)  -Consider taking an oral antihistamine (allegra, claritin, zyrtec) for allergies

## 2021-07-06 NOTE — PROGRESS NOTES
[facility] FOLLOW UP NOTE      Patient Name: Hi Park Record Number:  [id number]  Primary Care Physician:  Romi Contreras MD    ChiefComplaint     Chief Complaint   Patient presents with    Follow-up     States no changes in hearing, no worse, no better. Wants to know if there is anything he can take for the dizziness.  Procedure     Possible transtympanic steroid injection       History of Present Illness     Ted Holly is an 77 y.o. male with an episode of vertigo and hearing loss in the left ear on 5/21/2021; symptoms lasted several days, and he had significant dysequilibrium for 7-10 days thereafter. He was started on prednisone (60 mg x 2 days, then taper) however did not find improvement in hearing, and received MRI IAC with and without contrast no CPA lesions identified (we have personally reviewed the images). Since that time he states continued disequilibrium, particularly when he performs exercises that stresses bowel such as turning quickly or bending over and sitting up, however he does not have richie vertigo, or nausea with these episodes. He is able to fish on a boat, drive a car without issue. Continues to have left ear hearing loss (near complete) along with \"static\" (tinnitus) in the left ear. Believes hearing in the right ear is at baseline. 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 Denies otorrhea, otalgia. Interval history 6/22/2021: Significant hunger with oral steroids, else no side effects. Vertigo but continues to have imbalance, left ear hearing has not improved subjectively. Interval history 7/6/2021: No changes to hearing, continued intermittent imbalance and \"queasiness\" however no richie nausea/emesis. Seen by Dr. Khloe Patricio at [facility] 7/1/2021.      Past Medical History     Past Medical History:   Diagnosis Date    Anxiety     Bursitis of elbow     right    Coronary artery spasm (HCC)     Diabetes mellitus ([address]     borderline    GERD (gastroesophageal reflux disease)     Hyperlipidemia     Hypertension     Hypertrophy of prostate without urinary obstruction and other lower urinary tract symptoms (LUTS)     Pneumonia        Past Surgical History     Past Surgical History:   Procedure Laterality Date    COLONOSCOPY  2006    hemorroids    COLONOSCOPY N/A 7/9/2019    COLONOSCOPY WITH BIOPSY performed by Rosanne Rae DO at [address] COLONOSCOPY N/A 7/9/2019    COLONOSCOPY POLYPECTOMY SNARE/COLD BIOPSY performed by Rosanne Rae DO at [facility], COLON, DIAGNOSTIC      THROAT SURGERY  1970's    had tumors    UPPER GASTROINTESTINAL ENDOSCOPY  11/12/2015       Family History     Family History   Problem Relation Age of Onset    Diabetes Mother     Diabetes Father     Cancer Brother         pancreatic       Social History     Social History     Tobacco Use    Smoking status: Never Smoker    Smokeless tobacco: Former User     Types: Chew    Tobacco comment: Quit 5 years ago-smokeless tobacco   Vaping Use    Vaping Use: Never used   Substance Use Topics    Alcohol use: No     Alcohol/week: 0.0 standard drinks     Comment: quit     Drug use: No        Allergies     Allergies   Allergen Reactions    Metformin And Related     Statins      Muscle cramps  Muscle cramps       Medications     Current Outpatient Medications   Medication Sig Dispense Refill    fluticasone (FLONASE) 50 MCG/ACT nasal spray 1 spray by Each Nostril route 2 times daily 1 Bottle 2    predniSONE (DELTASONE) 20 MG tablet Take 3 tablets by mouth daily for 7 days, THEN 2.5 tablets daily for 2 days, THEN 2 tablets daily for 2 days, THEN 1.5 tablets daily for 2 days, THEN 1 tablet daily for 2 days, THEN 0.5 tablets daily for 2 days.  36 tablet 0    meclizine (ANTIVERT) 25 MG tablet Take 1 tablet by mouth 3 times daily as needed for Dizziness 30 tablet 0    Omega-3 Fatty Acids (FISH OIL PO) Take 1,000 mg by mouth daily       glipiZIDE (GLUCOTROL XL) 2.5 MG extended release tablet Take 1 tablet by mouth daily 90 tablet 0    Blood Glucose Monitoring Suppl (ACCU-CHEK GUIDE ME) w/Device KIT Use to test blood sugars once every morning. DX: E11.9 1 kit 0    blood glucose test strips (ACCU-CHEK GUIDE) strip Use to test blood sugars once every morning. DX: E11.9 100 each 3    Lancets MISC Use to test blood sugars once every morning. DX: E11.9 100 each 3    losartan-hydroCHLOROthiazide (HYZAAR) 100-12.5 MG per tablet Take 1 tablet by mouth once daily 90 tablet 2    lansoprazole (PREVACID) 30 MG delayed release capsule Take 1 capsule by mouth daily 90 capsule 1    metoprolol succinate (TOPROL XL) 100 MG extended release tablet Take 1 tablet by mouth once daily 90 tablet 2    escitalopram (LEXAPRO) 10 MG tablet Take 1 tablet by mouth daily 90 tablet 1    aspirin 81 MG tablet Take 1 tablet by mouth daily 30 tablet 0    ALPRAZolam (XANAX) 1 MG tablet TAKE 1/2 (ONE-HALF) TABLET BY MOUTH NIGHTLY AS NEEDED FOR SLEEP/ANXIETY 30 tablet 2     No current facility-administered medications for this visit.        Review of Systems     REVIEW OF SYSTEMS  The following systems were reviewed and revealed the following in addition to any already discussed in the HPI:    CONSTITUTIONAL: No weight loss, no fever, no night sweats, no chills  EYES: no vision changes, no blurry vision  EARS: + Changes in hearing, no otalgia  NOSE: no epistaxis, no rhinorrhea  RESPIRATORY: No difficulty breathing, no shortness of breath  CV: no chest pain, no peripheral vascular disease  HEME: No coagulation disorder, no bleeding disorder  NEURO: No TIA or stroke-like symptoms  SKIN: No new rashes in the head and neck, no recent skin cancers  MOUTH: No no new ulcers, no recent teeth infections  GASTROINTESTINAL: No diarrhea, stomach pain  PSYCH: No anxiety, no depression    PhysicalExam     Vitals:    07/06/21 0836   Temp: 97.2 °F (36.2 °C)   TempSrc: Infrared   Weight: 230 lb (104.3 kg)   Height: 6' (1.829 m)       PHYSICAL EXAM  Temp 97.2 °F (36.2 °C) (Infrared)   Ht 6' (1.829 m)   Wt 230 lb (104.3 kg)   BMI 31.19 kg/m²     GENERAL: No Acute Distress, Alert and Oriented, no hoarseness  EYES: EOMI, Anti-icteric  NOSE: On anterior rhinoscopy there is no epistaxis, nasal mucosa within normal limits, no purulent drainage  EARS: Normal external appearance; on portable otomicroscopy:  -Right ear: External auditory canal without stenosis, tympanic membrane clear, no middle ear effusions or retractions  -Left ear: External auditory canal without stenosis, tympanic membrane clear, no middle ear effusions or retractions  FACE: 1/6 House-Brackmann Scale, symmetric, sensation equal bilaterally  ORAL CAVITY: No masses or lesions palpated, uvula is midline, moist mucous membranes, 0+ tonsils, absent or dentition for no caries noted  NECK: Normal range of motion, no thyromegaly, trachea is midline, no lymphadenopathy, no neck masses, no crepitus  CHEST: Normal respiratory effort, no retractions, breathing comfortably  SKIN: No rashes, normal appearing skin, no evidence of skin lesions/tumors  NEURO: CN 2, 3, 4, 5, 6, 7, 11, 12 intact bilaterally     Data/Imaging Review             Procedure       Assessment and Plan     1. Ear congestion, bilateral  Good tympanic membrane mobility note on pneumatic otoscopy, may be allergic in nature - will have him start topical and oral steroids    2. Sudden hearing loss, left  Acute hearing loss in the left ear likely due to vestibular neuritisaudiometric testing showing profound hearing loss in the left ear with poor word recognition. Examination including binocular otomicroscopy without any lesions of the external auditory canal, tympanic membrane or middle ear.   Since starting oral steroids he

## 2021-07-06 NOTE — Clinical Note
Dr. Suki Blackburn,    Thank you for your referral for audiometric testing on this patient. Please see the scanned audiogram (under \"Media\" tab) and encounter note for details. If you have any questions, or if there is anything else you need, please let me know.       Eunice Cummins  Audiologist  ---  [facility] - Audiology

## 2021-07-06 NOTE — PATIENT INSTRUCTIONS
directly into a persons ear      Some additional items that may be helpful:   - Remain patient - this is important for both parties   - Write down items that still cannot be heard/understood. You may write with pen/paper or consider typing/texting on a cell phone or smart device. - If background noise is unavoidable, try to keep yourself in a good position in the room. By sitting at a york on the side of the restaurant (preferably a corner), it will be easier to communicate than if you were sitting at a table in the middle with background noise surrounding you. Keep yourself positioned away from music speakers or heavy foot traffic. Tinnitus: Overview and Management Strategies          Many people have some ringing sounds in their ears once in a while. You may hear a roar, a hiss, a tinkle, or a buzz. The sound usually lasts only a few minutes. If it goes on all the time, you may have tinnitus. Tinnitus is usually caused by long-term exposure to loud noise. This damages the nerves in the inner ear. It can occur with all types of hearing loss. It may be a symptom of almost any ear problem. Tinnitus may be caused by a buildup of earwax. Or, it may be caused by ear infections or certain medicines (especially antibiotics or large amounts of aspirin). You can also hear noises in your ears because of an injury to the ears, drinking too much alcohol or caffeine, or a medical condition. Other conditions may also contribute to tinnitus, including: head and neck trauma, temporomandibular joint disorder (TMJ), sinus pressure and barometric trauma, traumatic brain injury, metabolic disorders, autoimmune disorders, stress, and high blood pressure. You may need tests to evaluate your hearing and to find causes of long-lasting tinnitus. Your doctor may suggest one or more treatments to help you cope with the tinnitus. You can also do things at home to help reduce symptoms.     Follow-up care is a key part of your treatment and safety. Be sure to make and go to all appointments, and call your doctor if you are having problems. It's also a good idea to know your test results and keep a list of the medicines you take. How can you care for yourself at home? · Limit or cut out alcohol, caffeine, and sodium. They can make your symptoms worse. · Do not smoke or use other tobacco products. Nicotine reduces blood flow to the ear and makes tinnitus worse. If you need help quitting, talk to your doctor about stop-smoking programs and medicines. These can increase your chances of quitting for good. · Talk to your doctor about whether to stop taking aspirin and similar products such as ibuprofen or naproxen. · Get exercise often. It can help improve blood flow to the ear. Ways to manage/cope with tinnitus  Some tinnitus may last a long time. To manage your tinnitus, try to:  · Avoid noises that you think caused your tinnitus. If you can't avoid loud noises, wear earplugs or earmuffs. · Ignore the sound by paying attention to other things. Keeping your brain busy with other tasks or background noise can help your brain not focus on the tinnitus. · Try to not give the tinnitus an emotional reaction. Do your best to ignore the sound and not let it bother you. Relax using biofeedback, meditation, or yoga. Feeling stressed and being tired can make tinnitus worse. · Play music or white noise to help you sleep. Background noise may cover up the noise that you hear in your ears. You can buy a tabletop machine or a device that sits under your pillow to play soothing sounds, like ocean waves. · Smart phones have free apps, such as Whist, Relax Melodies, ReSound Relief, and White Noise Lite. These apps have different types of sounds/noise, some of which you can blend together to find sounds that are most soothing to you.   · Hearing aid technology, especially when there is some hearing loss, may help reduce tinnitus symptoms by giving your brain better access to the sounds it is missing. There are some hearing aids with built-in noise generator programs, which may help when amplification alone is not enough. Additional resources may be found through the American Tinnitus Association at www.moo.org    When should you call for help? Call 911 anytime you think you may need emergency care. For example, call if:    · You have symptoms of a stroke. These may include:  ? Sudden numbness, tingling, weakness, or loss of movement in your face, arm, or leg, especially on only one side of your body. ? Sudden vision changes. ? Sudden trouble speaking. ? Sudden confusion or trouble understanding simple statements. ? Sudden problems with walking or balance. ? A sudden, severe headache that is different from past headaches. Call your doctor now or seek immediate medical care if:    · You develop other symptoms. These may include hearing loss (or worse hearing loss), balance problems, dizziness, nausea, or vomiting. Watch closely for changes in your health, and be sure to contact your doctor if:    · Your tinnitus moves from both ears to one ear. · Your hearing loss gets worse within 1 day after an ear injury. · Your tinnitus or hearing loss does not get better within 1 week after an ear injury. · Your tinnitus bothers you enough that you want to take medicines to help you cope with it. If you notice changes in your tinnitus and/or your hearing, it is recommended that you have your hearing tested by your audiologist and to follow-up with your physician that manages your hearing loss (such as your ENT or Primary Care doctor). Hearing Loss: Care Instructions  Your Care Instructions      Hearing loss is a sudden or slow decrease in how well you hear. It can range from mild to profound. Permanent hearing loss can occur with aging, and it can happen when you are exposed long-term to loud noise.  Examples include listening to loud music, riding motorcycles, or being around other loud machines. Hearing loss can affect your work and home life. It can make you feel lonely or depressed. You may feel that you have lost your independence. But hearing aids and other devices can help you hear better and feel connected to others. Follow-up care is a key part of your treatment and safety. Be sure to make and go to all appointments, and call your doctor if you are having problems. It's also a good idea to know your test results and keep a list of the medicines you take. How can you care for yourself at home? · Avoid loud noises whenever possible. This helps keep your hearing from getting worse. Always wear hearing protection around loud noises. · If appropriate, wear hearing aid(s) as directed. It is recommended that hearing aids are worn during all waking hours to keep your brain active and give it access to the sounds it is missing. · If you are beginning your process with hearing aid(s), schedule a \"Hearing Aid Evaluation\" with an audiologist to discuss your lifestyle, features of hearing aid technology, and styles of hearing aids available. It is recommended that you contact your insurance company to determine if you have a hearing aid benefit, as this may dictate who you can see for these services. · Have hearing tests as your doctor suggests. They can show whether your hearing has changed. Your hearing aid may need to be adjusted. · Use other assistive devices as needed. These may include:  ? Telephone amplifiers and hearing aids that can connect to a television, stereo, radio, or microphone. ? Devices that use lights or vibrations. These alert you to the doorbell, a ringing telephone, or a baby monitor. ? Television closed-captioning. This shows the words at the bottom of the screen. Most new TVs can do this. ? TTY (text telephone).  This lets you type messages back and forth on the telephone instead of talking or listening. These devices are also called TDD. When messages are typed on the keyboard, they are sent over the phone line to a receiving TTY. The message is shown on a monitor. · Use pagers, fax machines, text, and email if it is hard for you to communicate by telephone. · Try to learn a listening technique called speech-reading. It is not lip-reading. You pay attention to people's gestures, expressions, posture, and tone of voice. These clues can help you understand what a person is saying. Face the person you are talking to, and have him or her face you. Make sure the lighting is good. You need to see the other person's face clearly. · Think about counseling if you need help to adjust to your hearing loss. When should you call for help? Watch closely for changes in your health, and be sure to contact your doctor if:    · You think your hearing is getting worse. · You have new symptoms, such as dizziness or nausea. Noise-Induced Hearing Loss  What it is, and what you can do to prevent it      Exposure to loud sounds, in an occupational setting or recreational, can cause permanent hearing loss. Sound is measured in decibels (dB). Noise-induced hearing loss is the ONLY type of preventable hearing loss. Hearing loss related to noise exposure can occur at any age. There are small sensory cells, called inner and outer hair cells, within the inner ear (cochlea). These cells process the loudness (intensity) and pitch (frequency) of sound and send the signal to the brain via our auditory nerve (vestibulocochlear nerve, cranial nerve VIII). When these cells are damaged, they can result in permanent hearing loss and/or tinnitus. The hair cells responsible for high frequency sounds, like birds chirping, are most likely to be damaged due to loud sounds. The high frequency sounds are also very important for our clarity and understanding of speech.       OCCUPATIONAL NOISE EXPOSURE RECREATIONAL NOISE EXPOSURE   Some jobs may have exposure to loud sounds in the workplace. These jobs may include but are not limited to:  [employer]   Construction   Welding   Landscaping   Hairdressing/hairstyling   Musicians  [employer]    ... And more! Many activities outside of work may cause permanent hearing loss. These activities may include but are not limited to:  Lawnmowers, leaf blowers  [employer] (such as pigs squealing)   Chainsaws and other power tools  [employer] musical instruments and/or singing   Listening to music too loudly - at concerts, through stereo, through ear buds or headphones   Attending sporting events   Attending fireworks shows or using fireworks at home  [employer] of firearms   . .. And more! REDUCE OR PROTECT YOUR EARS FROM NOISE EXPOSURE    To do your best to avoid noise-induced hearing loss, here are some tips:   Limit exposure to loud sounds. 85 dB (decibels) is safe for 8 hours. As sounds are louder, the length of time the sound is safe lessens. These numbers are cumulative across a 24-hour period. (NIOSH and CDC, 2002)  o 85 dB is safe for 8 hours  o 88 dB is safe for 4 hours  o 91 dB is safe for 2 hours  o 94 dB is safe for 1 hour  o 97 dB is safe for 30 minutes  o 100 dB is safe for 15 minutes  o 103 dB is safe for 7.5 minutes  o 106 dB is safe for 3.75 minutes  o 109 dB is safe for LESS THAN 2 minutes  o 112 dB is safe for LESS THAN 1 minute  o 115 dB is safe for ~ 30 seconds  o 130 dB can cause IMMEDIATE hearing loss   If you are unsure if a sound is too loud, consider checking the sound level with a \"sound level meter\". There are apps on smart devices that can measure the loudness of the sound. They are not as accurate as expensive equipment used by scientists, but it will give you a guesstimate of how loud the sound is, and if it may be damaging to your hearing.    If you confusion or trouble understanding simple statements. ? Sudden problems with walking or balance. ? A sudden, severe headache that is different from past headaches. Call your doctor now or seek immediate medical care if:    · You feel dizzy and have a fever, headache, or ringing in your ears. · You have new or increased nausea and vomiting. · Your dizziness does not go away or comes back. Watch closely for changes in your health, and be sure to contact your doctor if:    · You do not get better as expected. Where can you learn more? Go to https://[employer] SPINE.[employer]. org and sign in to your [employer] account. Enter K236 in the [employer] box to learn more about \"Dizziness: Care Instructions. \"     If you do not have an account, please click on the \"Sign Up Now\" link. Current as of: September 23, 2018  Content Version: 11.9  © 7733-4576 [employer]. Care instructions adapted under license by [facility] ([facility]). If you have questions about a medical condition or this instruction, always ask your healthcare professional. [address] any warranty or liability for your use of this information.

## 2021-07-06 NOTE — PROGRESS NOTES
Arnie Ferris   1955, 77 y.o. male   [id number]       Referring Provider: Amanda Pedroza MD  Referral Type: In an order in [address] [place] [address]    Reason for Visit: Determination of the effect of medication, surgery, or other treatment. ADULT AUDIOLOGIC EVALUATION      Arnie Ferris is a 77 y.o. male seen today, 7/6/2021 , for a recheck audiologic evaluation. Patient was seen by Amanda Pedroza MD following today's evaluation. Previous evaluation from 6/15/21 and 6/22/21 viewable in medical record. AUDIOLOGIC AND OTHER PERTINENT MEDICAL HISTORY:      Arnie Ferris noted no change in hearing following medical management recommended by Amanda Pedroza MD for sudden hearing loss of the left ear and dizziness that started 1.5 months page. Per previous evaluation patient notes dizziness with onset ~ 1 month ago, history of tinnitus worse in left ear since onset of dizziness, occupational noise exposure for 30+ years, and fall with head injury several years ago. No additional changes to otologic or medical history since previous evaluation were reported.      Rell HAQUE Kateryna denied otalgia, aural fullness, otorrhea, history of ear surgery and family history of hearing loss. Date: 7/6/2021     IMPRESSIONS:      Today's results revealed an asymmetric sensorineural hearing loss with poor word recognition, bilaterally. Asymmetries > 10 dBHL noted from 250-4000Hz and in word recognition with left ear (0%) worse than right (52%). Hearing stable compared to previous audiogram. Hearing loss significant enough to create hearing difficulty in at least some listening situations. Discussed benefits of amplification pending medical clearance due to noted dizziness and asymmetries. Patient reported interest in looking into [place] for possible hearing aid benefit.  Follow medical recommendations of Amanda Pedroza MD.     ASSESSMENT AND FINDINGS:     Otoscopy revealed: Clear ear canals bilaterally    RIGHT EAR:  Hearing Sensitivity: Evaluation (HAE) appointment to discuss hearing aid options. - Utilize \"Good Communication Strategies\" as discussed to assist in speech understanding with communication partners. - Maintain a sound enriched environment to assist in the management of tinnitus symptoms.   - Use hearing protection devices (HPDs), such as protective ear muffs and ear plugs, when exposed to dangerous sound levels. - If medically indicated, consider vestibular evaluation to further investigate symptoms of dizziness.        Eunice Staples  Audiologist    Chart CC'd to: Funmilayo Adams MD      Degree of   Hearing Sensitivity dB Range   Within Normal Limits (WNL) 0 - 20   Mild 20 - 40   Moderate 40 - 55   Moderately-Severe 55 - 70   Severe 70 - 90   Profound 90 +

## 2021-07-16 ENCOUNTER — TELEPHONE (OUTPATIENT)
Dept: AUDIOLOGY | Age: 66
End: 2021-07-16

## 2021-07-16 NOTE — TELEPHONE ENCOUNTER
Returned phone call. Recommended patient contact insurance to determine possible hearing aid benefit and schedule hearing aid evaluation, if desired.

## 2021-07-16 NOTE — TELEPHONE ENCOUNTER
Wife called to say the [facility] has denied their application for assistance with hearing aids.  She would like to ask if we have any other recommendations to help with the cost?   Godwin Mota [phone]

## 2021-07-27 RX ORDER — METOPROLOL SUCCINATE 100 MG/1
TABLET, EXTENDED RELEASE ORAL
Qty: 90 TABLET | Refills: 0 | Status: SHIPPED | OUTPATIENT
Start: 2021-07-27 | End: 2021-10-22

## 2021-08-11 ENCOUNTER — PROCEDURE VISIT (OUTPATIENT)
Dept: AUDIOLOGY | Age: 66
End: 2021-08-11

## 2021-08-11 DIAGNOSIS — H93.13 TINNITUS, BILATERAL: ICD-10-CM

## 2021-08-11 DIAGNOSIS — H90.3 SENSORINEURAL HEARING LOSS, BILATERAL: Primary | ICD-10-CM

## 2021-08-11 PROCEDURE — 99999 PR OFFICE/OUTPT VISIT,PROCEDURE ONLY: CPT | Performed by: AUDIOLOGIST

## 2021-08-11 NOTE — PROGRESS NOTES
Ni Cazares  [birth year]56 y.o. male   [id number]      HEARING [address]    Ni Cazares was seen today, 8/11/2021 , for a Hearing Aid Evaluation following audiologic evaluation on 7/6/21. Ni Cazares was accompanied by his wife. Patient has no prior history of hearing aid use. Patient was found to have no insurance benefit for hearing aids. Patient is responsible for cost of devices  Hearing aid benefit worksheet scanned into media tab. DATE: 8/11/2021     PROCEDURES:     SOUNDFIELD TESTING:     Name of test Type of amplification Level of signal Level of noise % Correct        Nu-6  None 55dBHL N/A CNT        Nu-6  None 45dBHL N/A CNT    QuickSIN None 70dBHL varied CNT       LIFESTYLE EVALUATION:      How do you spend most of your day? Patient enjoys being outside working and [place] the lake/fishing and spends most of time with wife. Which ear do you use on the phone? Right         Land line or cell phone  Flip phone (if important call has his wife call)    Has anyone recommended you wear HAs before? If yes, have you tried HAs? If no, why not? Do you feel like your hearing loss limits or hampers your personal or social life in any way?  - No    Does a hearing problem cause you to feel embarrassed when you meet new people?   - No    How do you compensate for things you miss or misunderstand?  - Ask to repeat or Huh? Does a hearing problem cause you to feel frustrated when talking to members of your family? No      Do they get frustrated with you? Sometimes    How does it make you feel when you miss things? - Does okay    Do you feel handicapped by your hearing problem? Does your hearing problem cause you difficulty when visiting friends, relatives, or neighbors? Yes    Does your hearing problem cause you to attend Christian services or group meetings less often than you would like?   No    Provide a guesstimate of the % of the service or meeting that you hear and

## 2021-08-17 ENCOUNTER — OFFICE VISIT (OUTPATIENT)
Dept: INTERNAL MEDICINE CLINIC | Age: 66
End: 2021-08-17

## 2021-08-17 VITALS
RESPIRATION RATE: 18 BRPM | BODY MASS INDEX: 31.97 KG/M2 | DIASTOLIC BLOOD PRESSURE: 78 MMHG | WEIGHT: 236 LBS | HEART RATE: 70 BPM | HEIGHT: 72 IN | SYSTOLIC BLOOD PRESSURE: 140 MMHG

## 2021-08-17 DIAGNOSIS — E78.2 MIXED HYPERLIPIDEMIA: ICD-10-CM

## 2021-08-17 DIAGNOSIS — F41.9 ANXIETY: ICD-10-CM

## 2021-08-17 DIAGNOSIS — E11.9 WELL CONTROLLED TYPE 2 DIABETES MELLITUS (HCC): Primary | ICD-10-CM

## 2021-08-17 DIAGNOSIS — R42 DIZZINESS: ICD-10-CM

## 2021-08-17 DIAGNOSIS — I10 ESSENTIAL HYPERTENSION: ICD-10-CM

## 2021-08-17 PROCEDURE — 3044F HG A1C LEVEL LT 7.0%: CPT | Performed by: INTERNAL MEDICINE

## 2021-08-17 PROCEDURE — 99212 OFFICE O/P EST SF 10 MIN: CPT | Performed by: INTERNAL MEDICINE

## 2021-08-17 PROCEDURE — 4040F PNEUMOC VAC/ADMIN/RCVD: CPT | Performed by: INTERNAL MEDICINE

## 2021-08-17 PROCEDURE — 1036F TOBACCO NON-USER: CPT | Performed by: INTERNAL MEDICINE

## 2021-08-17 PROCEDURE — G8427 DOCREV CUR MEDS BY ELIG CLIN: HCPCS | Performed by: INTERNAL MEDICINE

## 2021-08-17 PROCEDURE — G8417 CALC BMI ABV UP PARAM F/U: HCPCS | Performed by: INTERNAL MEDICINE

## 2021-08-17 PROCEDURE — 1123F ACP DISCUSS/DSCN MKR DOCD: CPT | Performed by: INTERNAL MEDICINE

## 2021-08-17 PROCEDURE — 2022F DILAT RTA XM EVC RTNOPTHY: CPT | Performed by: INTERNAL MEDICINE

## 2021-08-17 PROCEDURE — 3017F COLORECTAL CA SCREEN DOC REV: CPT | Performed by: INTERNAL MEDICINE

## 2021-08-17 RX ORDER — GLIPIZIDE 2.5 MG/1
2.5 TABLET, EXTENDED RELEASE ORAL 2 TIMES DAILY
Qty: 180 TABLET | Refills: 0 | Status: SHIPPED | OUTPATIENT
Start: 2021-08-17 | End: 2021-10-22 | Stop reason: SDUPTHER

## 2021-08-17 RX ORDER — ESCITALOPRAM OXALATE 10 MG/1
10 TABLET ORAL DAILY
Qty: 90 TABLET | Refills: 1 | Status: SHIPPED | OUTPATIENT
Start: 2021-08-17 | End: 2021-12-06 | Stop reason: SDUPTHER

## 2021-08-17 RX ORDER — LANSOPRAZOLE 30 MG/1
30 CAPSULE, DELAYED RELEASE ORAL DAILY
Qty: 90 CAPSULE | Refills: 1 | Status: SHIPPED | OUTPATIENT
Start: 2021-08-17 | End: 2021-12-06 | Stop reason: SDUPTHER

## 2021-08-17 RX ORDER — LOSARTAN POTASSIUM AND HYDROCHLOROTHIAZIDE 12.5; 1 MG/1; MG/1
TABLET ORAL
Qty: 90 TABLET | Refills: 2 | Status: SHIPPED | OUTPATIENT
Start: 2021-08-17 | End: 2022-10-20 | Stop reason: SDUPTHER

## 2021-08-17 NOTE — PROGRESS NOTES
Subjective:      Patient ID: Isabel West is a 74 y.o. male. HPI    74 y.o.. Male with known h.o HTN, Hyperlipidemia, anxiety here for regular f.w     Since last time pt was hospitalized for severe dizziness with presyncopal attacks, MRI brain neg. Thought to be from labrynthitis and vestibular issue, seen ENT placed on prolonged steroids and eventually improved  Reports still with intermittent dizzy spells when he moves his head suddenly   No falls  Reports he has chronic left hearing loss and now completely lost   Right hearing is suboptimal and planning for hearing aids soon     DM- 2-  Worsened with dietary habits and steroids . But changed diet and lost weight and home sugars are below 130 now on glipizide 2.5 mg bid  Compliant with diet and checking. No low sugars   No tingling numbness in feet  No blurry vision   Has not seen EYE MD yet      HTn - compliant with meds. On metoprolol, hyzaar- off norvasc for itching  No edema    Hyperlipidemia - on fenofibrate and unable to tolerate statins,  on fish oil , fenofibrate for low HDL -  developed skin rash and stopped fenofibrate         Anxiety on xanax BID prn using only as needed for chest pains which helps.  Cut down from TID     Does not smoke or chew anymore    Lives with wife  Independent of ADL and IADL       Current Outpatient Medications   Medication Sig Dispense Refill    metoprolol succinate (TOPROL XL) 100 MG extended release tablet Take 1 tablet by mouth once daily 90 tablet 0    fluticasone (FLONASE) 50 MCG/ACT nasal spray 1 spray by Each Nostril route 2 times daily 1 Bottle 2    meclizine (ANTIVERT) 25 MG tablet Take 1 tablet by mouth 3 times daily as needed for Dizziness 30 tablet 0    Omega-3 Fatty Acids (FISH OIL PO) Take 1,000 mg by mouth daily       glipiZIDE (GLUCOTROL XL) 2.5 MG extended release tablet Take 1 tablet by mouth daily 90 tablet 0    Blood Glucose Monitoring Suppl (ACCU-CHEK GUIDE ME) w/Device KIT Use to test blood sugars once every morning. DX: E11.9 1 kit 0    blood glucose test strips (ACCU-CHEK GUIDE) strip Use to test blood sugars once every morning. DX: E11.9 100 each 3    Lancets MISC Use to test blood sugars once every morning. DX: E11.9 100 each 3    losartan-hydroCHLOROthiazide (HYZAAR) 100-12.5 MG per tablet Take 1 tablet by mouth once daily 90 tablet 2    lansoprazole (PREVACID) 30 MG delayed release capsule Take 1 capsule by mouth daily 90 capsule 1    escitalopram (LEXAPRO) 10 MG tablet Take 1 tablet by mouth daily 90 tablet 1    ALPRAZolam (XANAX) 1 MG tablet TAKE 1/2 (ONE-HALF) TABLET BY MOUTH NIGHTLY AS NEEDED FOR SLEEP/ANXIETY 30 tablet 2    aspirin 81 MG tablet Take 1 tablet by mouth daily 30 tablet 0     No current facility-administered medications for this visit. Review of Systems     Constitutional: Negative for fatigue. HENT: Negative for ear discharge, hearing loss and trouble swallowing. Respiratory: Negative for chest tightness. Cardiovascular: +ve  chest pain and palpitations. Gastrointestinal: Negative for abdominal pain. Endocrine: Negative for polyuria. Genitourinary: Negative for frequency, hematuria and testicular pain. Musculoskeletal: Negative for myalgias. Neurological: Negative for light-headedness. Psychiatric/Behavioral: Negative for decreased concentration and sleep disturbance. The patient is nervous/anxious. There are no changes to past medical history, family history, social history or review of systems(except as noted in the history section) since prior note (all reviewed with patient). Objective:   Physical Exam  Vitals:    08/17/21 0950   BP: (!) 140/78   Pulse: 70   Resp: 18         General: eldelry male, healthy appearing    Awake, alert and oriented.  Appears to be not in any distress  Mucous Membranes:  Pink , anicteric  Neck: No JVD, no carotid bruit, no thyromegaly  Chest:  Clear to auscultation bilaterally, no added sounds  Cardiovascular:  RRR S1S2 heard, no murmurs or gallops  Abdomen:  Soft, undistended, non tender, no organomegaly, BS present  Extremities: No edema or cyanosis. Distal pulses well felt  Neuro - non focal except for hearing loss     Wt Readings from Last 3 Encounters:   08/17/21 236 lb (107 kg)   07/06/21 230 lb (104.3 kg)   06/22/21 233 lb (105.7 kg)       Assessment:       Diagnosis Orders   1. Well controlled type 2 diabetes mellitus ([facility] [address]  HEMOGLOBIN A1C   2. Essential hypertension     3. Anxiety     4. Mixed hyperlipidemia     5. Dizziness             Plan:           Prinzmetal angina -   Always gets better with xanax 0.25 mg half dose   Resolved now , prn xanax and daily lexapro       DM - 2 - did not tolerate metformin  Now doing diet and on glipizide bid  Can stop nightly glipizide for any low sugars  improved sugars. Continue diet and exercise  Keep off metformin for now  Yearly eye exam recommended  Continue ARB.      HTN- stable on current meds- hyzaar,metoprolol  off norvasc     Hyperlipidemia - on fish oil, stopped fenofibrate with myalgias  Continue same meds, last LDL at 123   Repeat labs -    Anxiety- on xanax  prn , no abuse noted  Uses as needed only, oarrs being monitored    OA of right shoulder- f/w Dr. Rodney House now s/p steroid injection     Dizziness - sec to labrynthitis - improved    Hearing loss - need hearing aids    Seasonal Allergies - on Claritin prn  Need living will    Need  shingles vaccine   Recommend covid vaccine   Pneumonia up to date      Had colonoscopy with polyp removal 2019

## 2021-08-26 NOTE — PROGRESS NOTES
Lilia Aparicio   1955, 74 y.o. male   [id number]     HEARING AID FITTING      RIGHT EAR: /MODELGlennon Severe P7-R  SN: [id number]  EARMOLD/TUBING/WIRE/DOME: 2(P) with small power dome    LEFT EAR: N/A  HAF: 2021  WARRANTY: 2024  30 DAY RIGHT TO RETURN: 10/1/2021    BUTTONS: volume control  PROGRAMS: Auto-Sense  PHONE CONNECTIVITY: N/A      Lilia Aparicio was seen today, 2021   to be fit with Zulma Aver P70-R hearing aid. Patient fit with 2(P)  and small power dome which appear to be a good fit. Programmed to 100% target gain. Counseled patient on use and care of devices and on realistic expectations. Device orientation was completed, includin. Hearing aid insertion/removal   2. Buttons/Indicators   3. How to charge device     4. Cleaning/maintenance of the device     5. Loss & Damage/Repair warranty information   6. 30-day right-to-return period    Lilia Aparicio noted good sound quality and comfort with hearing aid. Patient demonstrated proper insertion and removal of devices in office. Completed delivery statement and reviewed 30 day trial period and recall process for programming adjustments. PATIENT EDUCATION:       Lilia Aparicio was provided with the Hearing Aid Fitting Checklist as a reference of items discussed at today's appointment. Patient may find additional product information in the provided hearing aid  device manual.    Information was shared verbally with patient during appointment. RECOMMENDATIONS:     - Return for follow-up appointment within 30-day right-to-return period.  - Continue wearing both HAs during all waking hours. - Retest hearing as medically indicated and/or sooner if a change in hearing is noted. - Contact audiologist with questions/concerns as needed    Patient paid total cost of $2,600.00.     Unbundled Billing- see associated fees and codes below:    Description Code Amount   Hearing Aids

## 2021-08-31 ENCOUNTER — TELEPHONE (OUTPATIENT)
Dept: GASTROENTEROLOGY | Age: 66
End: 2021-08-31

## 2021-08-31 ENCOUNTER — PROCEDURE VISIT (OUTPATIENT)
Dept: AUDIOLOGY | Age: 66
End: 2021-08-31

## 2021-08-31 DIAGNOSIS — H90.3 SENSORINEURAL HEARING LOSS, BILATERAL: Primary | ICD-10-CM

## 2021-08-31 DIAGNOSIS — H93.13 TINNITUS, BILATERAL: ICD-10-CM

## 2021-08-31 PROCEDURE — V5257 HEARING AID, DIGIT, MON, BTE: HCPCS | Performed by: AUDIOLOGIST

## 2021-08-31 PROCEDURE — V5265 EAR MOLD/INSERT, DISP: HCPCS | Performed by: AUDIOLOGIST

## 2021-08-31 PROCEDURE — V5011 HEARING AID FITTING/CHECKING: HCPCS | Performed by: AUDIOLOGIST

## 2021-08-31 PROCEDURE — V5020 CONFORMITY EVALUATION: HCPCS | Performed by: AUDIOLOGIST

## 2021-08-31 PROCEDURE — V5241 DISPENSING FEE, MONAURAL: HCPCS | Performed by: AUDIOLOGIST

## 2021-09-14 ENCOUNTER — OFFICE VISIT (OUTPATIENT)
Dept: ENT CLINIC | Age: 66
End: 2021-09-14
Payer: MEDICARE

## 2021-09-14 VITALS — TEMPERATURE: 97.2 F | BODY MASS INDEX: 31.15 KG/M2 | WEIGHT: 230 LBS | HEIGHT: 72 IN

## 2021-09-14 DIAGNOSIS — H83.02 ACUTE LABYRINTHITIS, LEFT: Primary | ICD-10-CM

## 2021-09-14 PROCEDURE — 1123F ACP DISCUSS/DSCN MKR DOCD: CPT | Performed by: OTOLARYNGOLOGY

## 2021-09-14 PROCEDURE — 99213 OFFICE O/P EST LOW 20 MIN: CPT | Performed by: OTOLARYNGOLOGY

## 2021-09-14 PROCEDURE — G8417 CALC BMI ABV UP PARAM F/U: HCPCS | Performed by: OTOLARYNGOLOGY

## 2021-09-14 PROCEDURE — G8427 DOCREV CUR MEDS BY ELIG CLIN: HCPCS | Performed by: OTOLARYNGOLOGY

## 2021-09-14 PROCEDURE — 1036F TOBACCO NON-USER: CPT | Performed by: OTOLARYNGOLOGY

## 2021-09-14 PROCEDURE — 4040F PNEUMOC VAC/ADMIN/RCVD: CPT | Performed by: OTOLARYNGOLOGY

## 2021-09-14 PROCEDURE — 3017F COLORECTAL CA SCREEN DOC REV: CPT | Performed by: OTOLARYNGOLOGY

## 2021-09-14 NOTE — PROGRESS NOTES
[facility] FOLLOW UP NOTE      Patient Name: Arnie Ferris  Medical Record Number:  [id number]  Primary Care Physician:  Venkat Rodriguez MD    ChiefComplaint     Chief Complaint   Patient presents with    Follow-up     Since last visit has been \"doing alright\", is still having dizziness. Dizziness is pretty constant, but same days are worse than others. History of Present Illness     Arnie Ferris is an 74 y.o. male with an episode of vertigo and hearing loss in the left ear on 5/21/2021; symptoms lasted several days, and he had significant dysequilibrium for 7-10 days thereafter. He was started on prednisone (60 mg x 2 days, then taper) however did not find improvement in hearing, and received MRI IAC with and without contrast no CPA lesions identified (we have personally reviewed the images). Since that time he states continued disequilibrium, particularly when he performs exercises that stresses bowel such as turning quickly or bending over and sitting up, however he does not have richie vertigo, or nausea with these episodes. He is able to fish on a boat, drive a car without issue. Continues to have left ear hearing loss (near complete) along with \"static\" (tinnitus) in the left ear. Believes hearing in the right ear is at baseline. 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 Denies otorrhea, otalgia. Interval history 6/22/2021: Significant hunger with oral steroids, else no side effects. Vertigo but continues to have imbalance, left ear hearing has not improved subjectively. Interval history 9/14/2021: Wearing a behind the ear hearing in the right ear, he states improvement in hearing[facility] however states he makes too much money to qualify for hearing aids.   No otorrhea, otalgia, occasional disequilibrium however no vertigo. Left ear remains without hearing.     Past Medical History     Past Medical History:   Diagnosis Date    Anxiety     Bursitis of elbow     right    Coronary artery spasm ([address]     Diabetes mellitus ([address]     borderline    GERD (gastroesophageal reflux disease)     Hyperlipidemia     Hypertension     Hypertrophy of prostate without urinary obstruction and other lower urinary tract symptoms (LUTS)     Pneumonia        Past Surgical History     Past Surgical History:   Procedure Laterality Date    COLONOSCOPY  2006    hemorroids    COLONOSCOPY N/A 7/9/2019    COLONOSCOPY WITH BIOPSY performed by Gladis Burgos DO at [address] [facility] COLONOSCOPY N/A 7/9/2019    COLONOSCOPY POLYPECTOMY SNARE/COLD BIOPSY performed by Gladis Burgos DO at [facility], COLON, DIAGNOSTIC      THROAT SURGERY  1970's    had tumors    UPPER GASTROINTESTINAL ENDOSCOPY  11/12/2015       Family History     Family History   Problem Relation Age of Onset    Diabetes Mother     Diabetes Father     Cancer Brother         pancreatic       Social History     Social History     Tobacco Use    Smoking status: Never Smoker    Smokeless tobacco: Former User     Types: Chew    Tobacco comment: Quit 5 years ago-smokeless tobacco   Vaping Use    Vaping Use: Never used   Substance Use Topics    Alcohol use: No     Alcohol/week: 0.0 standard drinks     Comment: quit     Drug use: No        Allergies     Allergies   Allergen Reactions    Metformin And Related     Statins      Muscle cramps  Muscle cramps       Medications     Current Outpatient Medications   Medication Sig Dispense Refill    glipiZIDE (GLUCOTROL XL) 2.5 MG extended release tablet Take 1 tablet by mouth 2 times daily 180 tablet 0    losartan-hydroCHLOROthiazide (HYZAAR) 100-12.5 MG per tablet Take 1 tablet by mouth once daily 90 tablet 2    lansoprazole (PREVACID) 30 MG delayed release capsule Take 1 capsule by mouth daily 90 capsule 1    escitalopram (LEXAPRO) 10 MG tablet Take 1 tablet by mouth daily 90 tablet 1    metoprolol succinate (TOPROL XL) 100 MG extended release tablet Take 1 tablet by mouth once daily 90 tablet 0    fluticasone (FLONASE) 50 MCG/ACT nasal spray 1 spray by Each Nostril route 2 times daily 1 Bottle 2    meclizine (ANTIVERT) 25 MG tablet Take 1 tablet by mouth 3 times daily as needed for Dizziness 30 tablet 0    Omega-3 Fatty Acids (FISH OIL PO) Take 1,000 mg by mouth daily       Blood Glucose Monitoring Suppl (ACCU-CHEK GUIDE ME) w/Device KIT Use to test blood sugars once every morning. DX: E11.9 1 kit 0    blood glucose test strips (ACCU-CHEK GUIDE) strip Use to test blood sugars once every morning. DX: E11.9 100 each 3    Lancets MISC Use to test blood sugars once every morning. DX: E11.9 100 each 3    aspirin 81 MG tablet Take 1 tablet by mouth daily 30 tablet 0    ALPRAZolam (XANAX) 1 MG tablet TAKE 1/2 (ONE-HALF) TABLET BY MOUTH NIGHTLY AS NEEDED FOR SLEEP/ANXIETY 30 tablet 2     No current facility-administered medications for this visit.        Review of Systems     REVIEW OF SYSTEMS  The following systems were reviewed and revealed the following in addition to any already discussed in the HPI:    CONSTITUTIONAL: No weight loss, no fever, no night sweats, no chills  EYES: no vision changes, no blurry vision  EARS: + Changes in hearing, no otalgia  NOSE: no epistaxis, no rhinorrhea  RESPIRATORY: No difficulty breathing, no shortness of breath  CV: no chest pain, no peripheral vascular disease  HEME: No coagulation disorder, no bleeding disorder  NEURO: No TIA or stroke-like symptoms  SKIN: No new rashes in the head and neck, no recent skin cancers  MOUTH: No no new ulcers, no recent teeth infections  GASTROINTESTINAL: No diarrhea, stomach pain  PSYCH: No anxiety, no depression    PhysicalExam     Vitals:    09/14/21 1000   Temp: 97.2 °F (36.2 °C)   TempSrc: Infrared   Weight: 230 lb (104.3 kg)   Height: 6' (1.829 m)       PHYSICAL EXAM  Temp 97.2 °F (36.2 °C) (Infrared)   Ht 6' (1.829 m)   Wt 230 lb (104.3 kg)   BMI 31.19 kg/m²     GENERAL: No Acute Distress, Alert and Oriented, no hoarseness  EYES: EOMI, Anti-icteric  NOSE: On anterior rhinoscopy there is no epistaxis, nasal mucosa within normal limits, no purulent drainage  EARS: Normal external appearance; on portable otomicroscopy:  -Right ear: External auditory canal without stenosis, tympanic membrane clear, no middle ear effusions or retractions  -Left ear: External auditory canal without stenosis, tympanic membrane clear, no middle ear effusions or retractions  FACE: 1/6 House-Brackmann Scale, symmetric, sensation equal bilaterally  ORAL CAVITY: No masses or lesions palpated, uvula is midline, moist mucous membranes, 0+ tonsils, absent or dentition for no caries noted  NECK: Normal range of motion, no thyromegaly, trachea is midline, no lymphadenopathy, no neck masses, no crepitus  CHEST: Normal respiratory effort, no retractions, breathing comfortably  SKIN: No rashes, normal appearing skin, no evidence of skin lesions/tumors  NEURO: CN 2, 3, 4, 5, 6, 7, 11, 12 intact bilaterally     Data/Imaging Review             Procedure       Assessment and Plan     1. Vestibular neuritis, left  Combination of vertigo, hearing loss in the left ear suspicious for vestibular neuritis, especially given duration of vertigo. Slight improvement in pure-tone audiometry in the left eardid not improve after second round of high-dose steroids. Currently using behind the ear hearing aid in the right ear, is not currently interested in CROS hearing aids, BAHA or cochlear implantation at this time. We will have him follow-up on 9/16/2021 for fitting of the hearing aid (he may need earmolds taken), and we will otherwise see him back on an as-needed basis. Return if symptoms worsen or fail to improve.     MD David Petersen [facility]  Department of Otolaryngology/Head and Neck Surgery  9/14/21    I have performed a head and neck physical exam personally or was physically present during the key or critical portions of the service. Medical Decision Making: The following items were considered in medical decision making:  Independent review of images  Review / order clinical lab tests  Review / order radiology tests  Decision to obtain old records  Review and summation of old records as [facility] (a summary of my findings in these old records: None)     Portions of this note were dictated using Dragon.  There may be linguistic errors secondary to the use of this program.

## 2021-09-16 ENCOUNTER — PROCEDURE VISIT (OUTPATIENT)
Dept: AUDIOLOGY | Age: 66
End: 2021-09-16

## 2021-09-16 DIAGNOSIS — H93.13 TINNITUS, BILATERAL: ICD-10-CM

## 2021-09-16 DIAGNOSIS — H90.3 SENSORINEURAL HEARING LOSS, BILATERAL: Primary | ICD-10-CM

## 2021-09-16 PROCEDURE — 99999 PR OFFICE/OUTPT VISIT,PROCEDURE ONLY: CPT | Performed by: AUDIOLOGIST

## 2021-10-01 NOTE — PROGRESS NOTES
Nina Valdez  1955.66 y.o. male   [id number]    HEARING AID CHECK    Nina Valdez was seen today, 10/6/2021, for a hearing aid check appointment, accompanied by his wife. PROCEDURES:     Otoscopy revealed: Clear ear canals bilaterally    Patient noted overall benefit with device and decided to continue with advanced technology. He notes improvement with fitting and changes made at previous appointment. Connected devices to fitting software. Datalogging revealed 5 hours of use per day. PATIENT EDUCATION:     - Verbally and visually reviewed importance of consistent use and care and maintenance of devices. Information was verbally shared with patient during appointment. RECOMMENDATIONS:     - Continue consistent hearing aid use  - Return for hearing aid checks as needed  - Retest hearing as medically indicated and/or sooner if a change in hearing is noted.   - Contact audiologist with questions/concerns as needed    **No charge for today's appointment    Eunice Pacheco Sa  Audiologist

## 2021-10-06 ENCOUNTER — PROCEDURE VISIT (OUTPATIENT)
Dept: AUDIOLOGY | Age: 66
End: 2021-10-06

## 2021-10-06 DIAGNOSIS — H90.3 SENSORINEURAL HEARING LOSS, BILATERAL: Primary | ICD-10-CM

## 2021-10-06 DIAGNOSIS — H93.13 TINNITUS, BILATERAL: ICD-10-CM

## 2021-10-06 PROCEDURE — 99999 PR OFFICE/OUTPT VISIT,PROCEDURE ONLY: CPT | Performed by: AUDIOLOGIST

## 2021-10-22 RX ORDER — METOPROLOL SUCCINATE 100 MG/1
TABLET, EXTENDED RELEASE ORAL
Qty: 90 TABLET | Refills: 0 | Status: SHIPPED | OUTPATIENT
Start: 2021-10-22 | End: 2021-12-06 | Stop reason: SDUPTHER

## 2021-10-22 RX ORDER — GLIPIZIDE 2.5 MG/1
2.5 TABLET, EXTENDED RELEASE ORAL 2 TIMES DAILY
Qty: 180 TABLET | Refills: 0 | Status: SHIPPED | OUTPATIENT
Start: 2021-10-22 | End: 2022-02-08

## 2021-10-22 RX ORDER — GLIPIZIDE 5 MG/1
TABLET ORAL
Qty: 60 TABLET | Refills: 0 | OUTPATIENT
Start: 2021-10-22

## 2021-11-04 DIAGNOSIS — F41.9 ANXIETY: ICD-10-CM

## 2021-11-04 RX ORDER — ALPRAZOLAM 1 MG/1
TABLET ORAL
Qty: 15 TABLET | Refills: 0 | Status: SHIPPED | OUTPATIENT
Start: 2021-11-04 | End: 2021-12-06

## 2021-12-06 ENCOUNTER — OFFICE VISIT (OUTPATIENT)
Dept: INTERNAL MEDICINE CLINIC | Age: 66
End: 2021-12-06

## 2021-12-06 VITALS
RESPIRATION RATE: 18 BRPM | HEIGHT: 72 IN | WEIGHT: 239 LBS | SYSTOLIC BLOOD PRESSURE: 125 MMHG | HEART RATE: 70 BPM | BODY MASS INDEX: 32.37 KG/M2 | DIASTOLIC BLOOD PRESSURE: 75 MMHG

## 2021-12-06 DIAGNOSIS — F41.9 ANXIETY: ICD-10-CM

## 2021-12-06 DIAGNOSIS — E11.9 WELL CONTROLLED TYPE 2 DIABETES MELLITUS (HCC): ICD-10-CM

## 2021-12-06 DIAGNOSIS — E11.9 WELL CONTROLLED TYPE 2 DIABETES MELLITUS (HCC): Primary | ICD-10-CM

## 2021-12-06 DIAGNOSIS — I10 ESSENTIAL HYPERTENSION: ICD-10-CM

## 2021-12-06 DIAGNOSIS — E78.2 MIXED HYPERLIPIDEMIA: ICD-10-CM

## 2021-12-06 PROCEDURE — 4040F PNEUMOC VAC/ADMIN/RCVD: CPT | Performed by: INTERNAL MEDICINE

## 2021-12-06 PROCEDURE — G8427 DOCREV CUR MEDS BY ELIG CLIN: HCPCS | Performed by: INTERNAL MEDICINE

## 2021-12-06 PROCEDURE — 1123F ACP DISCUSS/DSCN MKR DOCD: CPT | Performed by: INTERNAL MEDICINE

## 2021-12-06 PROCEDURE — 1036F TOBACCO NON-USER: CPT | Performed by: INTERNAL MEDICINE

## 2021-12-06 PROCEDURE — 99212 OFFICE O/P EST SF 10 MIN: CPT | Performed by: INTERNAL MEDICINE

## 2021-12-06 PROCEDURE — G8484 FLU IMMUNIZE NO ADMIN: HCPCS | Performed by: INTERNAL MEDICINE

## 2021-12-06 PROCEDURE — 2022F DILAT RTA XM EVC RTNOPTHY: CPT | Performed by: INTERNAL MEDICINE

## 2021-12-06 PROCEDURE — G8417 CALC BMI ABV UP PARAM F/U: HCPCS | Performed by: INTERNAL MEDICINE

## 2021-12-06 PROCEDURE — 3044F HG A1C LEVEL LT 7.0%: CPT | Performed by: INTERNAL MEDICINE

## 2021-12-06 PROCEDURE — 3017F COLORECTAL CA SCREEN DOC REV: CPT | Performed by: INTERNAL MEDICINE

## 2021-12-06 RX ORDER — ESCITALOPRAM OXALATE 10 MG/1
10 TABLET ORAL DAILY
Qty: 90 TABLET | Refills: 1 | Status: SHIPPED | OUTPATIENT
Start: 2021-12-06 | End: 2022-10-20 | Stop reason: ALTCHOICE

## 2021-12-06 RX ORDER — METOPROLOL SUCCINATE 100 MG/1
100 TABLET, EXTENDED RELEASE ORAL DAILY
Qty: 90 TABLET | Refills: 1 | Status: SHIPPED | OUTPATIENT
Start: 2021-12-06 | End: 2022-07-25

## 2021-12-06 RX ORDER — MECLIZINE HYDROCHLORIDE 25 MG/1
25 TABLET ORAL 3 TIMES DAILY PRN
Qty: 30 TABLET | Refills: 0 | Status: SHIPPED | OUTPATIENT
Start: 2021-12-06

## 2021-12-06 RX ORDER — LANSOPRAZOLE 30 MG/1
30 CAPSULE, DELAYED RELEASE ORAL DAILY
Qty: 90 CAPSULE | Refills: 1 | Status: SHIPPED | OUTPATIENT
Start: 2021-12-06 | End: 2022-10-18

## 2021-12-06 NOTE — PROGRESS NOTES
Subjective:      Patient ID: Yasmine Frye is a 74 y.o. male. HPI    74 y.o.. Male with known h.o HTN, Hyperlipidemia, anxiety here for regular f.w     Since last time pt dizziness symptoms improved a lot and not taking meclizine any more unless needed  No falls    Reports he has chronic left hearing loss and now completely lost   Right hearing is suboptimal and just had hearing aids and doing better     DM- 2-  Worsened with dietary habits and recent steroids . But changed diet and lost weight and home sugars are below 130 now on glipizide 2.5 mg bid  Compliant with diet and checking. No low sugars   No tingling numbness in feet  No blurry vision   Has not seen EYE MD yet      HTn - compliant with meds. On metoprolol, hyzaar- off norvasc for itching  No edema    Hyperlipidemia - on fenofibrate and unable to tolerate statins,  on fish oil , fenofibrate for low HDL -  developed skin rash and stopped fenofibrate         Anxiety on xanax using only as needed for chest pains which helps.  Cut down from TID to half tab prn now    Does not smoke or chew anymore    Lives with wife  Independent of ADL and IADL       Current Outpatient Medications   Medication Sig Dispense Refill    ALPRAZolam (XANAX) 1 MG tablet TAKE 1/2 (ONE-HALF) TABLET BY MOUTH NIGHTLY AS NEEDED FOR SLEEP FOR 30 DAYS 30 tablet 2    metoprolol succinate (TOPROL XL) 100 MG extended release tablet Take 1 tablet by mouth once daily 90 tablet 0    glipiZIDE (GLUCOTROL XL) 2.5 MG extended release tablet Take 1 tablet by mouth 2 times daily 180 tablet 0    losartan-hydroCHLOROthiazide (HYZAAR) 100-12.5 MG per tablet Take 1 tablet by mouth once daily 90 tablet 2    lansoprazole (PREVACID) 30 MG delayed release capsule Take 1 capsule by mouth daily 90 capsule 1    escitalopram (LEXAPRO) 10 MG tablet Take 1 tablet by mouth daily 90 tablet 1    fluticasone (FLONASE) 50 MCG/ACT nasal spray 1 spray by Each Nostril route 2 times daily 1 Bottle 2    meclizine (ANTIVERT) 25 MG tablet Take 1 tablet by mouth 3 times daily as needed for Dizziness 30 tablet 0    Omega-3 Fatty Acids (FISH OIL PO) Take 1,000 mg by mouth daily       Blood Glucose Monitoring Suppl (ACCU-CHEK GUIDE ME) w/Device KIT Use to test blood sugars once every morning. DX: E11.9 1 kit 0    blood glucose test strips (ACCU-CHEK GUIDE) strip Use to test blood sugars once every morning. DX: E11.9 100 each 3    Lancets MISC Use to test blood sugars once every morning. DX: E11.9 100 each 3    aspirin 81 MG tablet Take 1 tablet by mouth daily 30 tablet 0     No current facility-administered medications for this visit. Review of Systems     Constitutional: Negative for fatigue. HENT: Negative for ear discharge, hearing loss and trouble swallowing. Respiratory: Negative for chest tightness. Cardiovascular: +ve  chest pain and palpitations. Gastrointestinal: Negative for abdominal pain. Endocrine: Negative for polyuria. Genitourinary: Negative for frequency, hematuria and testicular pain. Musculoskeletal: Negative for myalgias. Neurological: Negative for light-headedness. Psychiatric/Behavioral: Negative for decreased concentration and sleep disturbance. The patient is nervous/anxious. There are no changes to past medical history, family history, social history or review of systems(except as noted in the history section) since prior note (all reviewed with patient). Objective:   Physical Exam  Vitals:    12/06/21 0958   BP: 125/75   Pulse: 70   Resp: 18         General: eldelry male, healthy appearing    Awake, alert and oriented. Appears to be not in any distress  Mucous Membranes:  Pink , anicteric  Neck: No JVD, no carotid bruit, no thyromegaly  Chest:  Clear to auscultation bilaterally, no added sounds  Cardiovascular:  RRR S1S2 heard, no murmurs or gallops  Abdomen:  Soft, undistended, non tender, no organomegaly, BS present  Extremities: No edema or cyanosis. Distal pulses well felt  Neuro - non focal except for hearing loss   Has hearing aids now     Wt Readings from Last 3 Encounters:   12/06/21 239 lb (108.4 kg)   09/14/21 230 lb (104.3 kg)   08/17/21 236 lb (107 kg)       Assessment:       Diagnosis Orders   1. Well controlled type 2 diabetes mellitus ([facility] [address]     2. Essential hypertension     3. Mixed hyperlipidemia     4. Anxiety             Plan:           Prinzmetal angina -   Always gets better with xanax 0.25 mg half dose   Resolved now , prn xanax and daily lexapro       DM - 2 - did not tolerate metformin  Now doing diet and on glipizide bid  improved sugars. Continue diet and exercise  Keep off metformin for now  Yearly eye exam recommended  Continue ARB.      HTN- stable on current meds- hyzaar,metoprolol  off norvasc     Hyperlipidemia - on fish oil, stopped fenofibrate with myalgias  Continue same meds, last LDL at 123   Repeat labs needed    Anxiety- on xanax half tab  prn , no abuse noted  Uses as needed only, oarrs being monitored    OA of right shoulder- f/w Dr. Ani Hughes now s/p steroid injection     Dizziness - sec to labrynthitis - improved- off steroids     Hearing loss - now has hearing aids    Seasonal Allergies - on Claritin prn  Need living will    Need  shingles vaccine   Recommend covid vaccine   Pneumonia up to date      Had colonoscopy with polyp removal 2019

## 2021-12-07 LAB
A/G RATIO: 2.3 (ref 1.1–2.2)
ALBUMIN SERPL-MCNC: 5 G/DL (ref 3.4–5)
ALP BLD-CCNC: 59 U/L (ref 40–129)
ALT SERPL-CCNC: 21 U/L (ref 10–40)
ANION GAP SERPL CALCULATED.3IONS-SCNC: 19 MMOL/L (ref 3–16)
AST SERPL-CCNC: 16 U/L (ref 15–37)
BILIRUB SERPL-MCNC: 0.5 MG/DL (ref 0–1)
BUN BLDV-MCNC: 15 MG/DL (ref 7–20)
CALCIUM SERPL-MCNC: 9.7 MG/DL (ref 8.3–10.6)
CHLORIDE BLD-SCNC: 95 MMOL/L (ref 99–110)
CO2: 24 MMOL/L (ref 21–32)
CREAT SERPL-MCNC: 0.9 MG/DL (ref 0.8–1.3)
ESTIMATED AVERAGE GLUCOSE: 131.2 MG/DL
GFR AFRICAN AMERICAN: >60
GFR NON-AFRICAN AMERICAN: >60
GLUCOSE BLD-MCNC: 125 MG/DL (ref 70–99)
HBA1C MFR BLD: 6.2 %
POTASSIUM SERPL-SCNC: 4.1 MMOL/L (ref 3.5–5.1)
SODIUM BLD-SCNC: 138 MMOL/L (ref 136–145)
TOTAL PROTEIN: 7.2 G/DL (ref 6.4–8.2)

## 2022-02-08 RX ORDER — GLIPIZIDE 2.5 MG/1
TABLET, EXTENDED RELEASE ORAL
Qty: 180 TABLET | Refills: 0 | Status: SHIPPED | OUTPATIENT
Start: 2022-02-08 | End: 2022-04-28

## 2022-03-02 ENCOUNTER — TELEPHONE (OUTPATIENT)
Dept: ENT CLINIC | Age: 67
End: 2022-03-02

## 2022-03-02 NOTE — PROGRESS NOTES
Lia Reyez  1955.66 y.o. male   [id number]    HEARING AID CHECK    Lia Reyez was seen today, 3/3/2022, for a hearing aid check appointment    PROCEDURES:     Otoscopy revealed: Clear ear canals bilaterally    Patient noted right hearing aid not working properly. Inspection of device revealed right  clogged with wax guard. Unable to retrieve guard from . Replaced 2(P) right . Hearing aid was cleaned and checked. Microphone and  ports were suctioned, domes and wax trap were changed, and devics completed a desiccant cycle through [employer]. Post-cleaning listening check revealed good function of the devices. Ordered replacement  from [facility] (confirmation #: [id number]). Connected devices to fitting software. Datalogging revealed 13 hours of use per day. PATIENT EDUCATION:     - Verbally and visually reviewed importance of consistent use and care and maintenance of devices. Information was verbally shared with patient during appointment. RECOMMENDATIONS:     - Continue consistent hearing aid use  - Return for hearing aid checks as needed  - Retest hearing as medically indicated and/or sooner if a change in hearing is noted. - Contact audiologist with questions/concerns as needed      **No charge for today's appointment; patient under service warranty through 08/31/2022.        Eunice Hernández  Audiologist

## 2022-03-02 NOTE — TELEPHONE ENCOUNTER
Patient's wife Glenard Pinon called in to let Dr. Lien Noble know patient's right hearing aid will not hold a charge. Patient stating they have changed the wax guard.       Please call back [phone]  Last office visit 10/6/21

## 2022-03-03 ENCOUNTER — PROCEDURE VISIT (OUTPATIENT)
Dept: AUDIOLOGY | Age: 67
End: 2022-03-03

## 2022-03-03 DIAGNOSIS — H90.3 SENSORINEURAL HEARING LOSS, BILATERAL: Primary | ICD-10-CM

## 2022-03-03 DIAGNOSIS — H93.13 TINNITUS, BILATERAL: ICD-10-CM

## 2022-03-03 PROCEDURE — 99999 PR OFFICE/OUTPT VISIT,PROCEDURE ONLY: CPT | Performed by: AUDIOLOGIST

## 2022-03-25 RX ORDER — BLOOD SUGAR DIAGNOSTIC
STRIP MISCELLANEOUS
Qty: 100 EACH | Refills: 0 | Status: SHIPPED | OUTPATIENT
Start: 2022-03-25 | End: 2022-08-16

## 2022-04-14 ENCOUNTER — OFFICE VISIT (OUTPATIENT)
Dept: INTERNAL MEDICINE CLINIC | Age: 67
End: 2022-04-14

## 2022-04-14 VITALS
RESPIRATION RATE: 18 BRPM | DIASTOLIC BLOOD PRESSURE: 75 MMHG | WEIGHT: 236 LBS | HEART RATE: 70 BPM | HEIGHT: 72 IN | BODY MASS INDEX: 31.97 KG/M2 | SYSTOLIC BLOOD PRESSURE: 135 MMHG

## 2022-04-14 DIAGNOSIS — I10 ESSENTIAL HYPERTENSION: ICD-10-CM

## 2022-04-14 DIAGNOSIS — E11.9 TYPE 2 DIABETES MELLITUS WITHOUT COMPLICATION, WITHOUT LONG-TERM CURRENT USE OF INSULIN (HCC): ICD-10-CM

## 2022-04-14 DIAGNOSIS — E78.2 MIXED HYPERLIPIDEMIA: ICD-10-CM

## 2022-04-14 DIAGNOSIS — R42 DIZZINESS: ICD-10-CM

## 2022-04-14 DIAGNOSIS — Z13.220 SCREENING FOR HYPERLIPIDEMIA: ICD-10-CM

## 2022-04-14 DIAGNOSIS — Z00.00 MEDICARE ANNUAL WELLNESS VISIT, SUBSEQUENT: Primary | ICD-10-CM

## 2022-04-14 DIAGNOSIS — Z86.79 H/O PRINZMETAL ANGINA: ICD-10-CM

## 2022-04-14 DIAGNOSIS — F41.9 ANXIETY: ICD-10-CM

## 2022-04-14 DIAGNOSIS — Z00.00 MEDICARE ANNUAL WELLNESS VISIT, SUBSEQUENT: ICD-10-CM

## 2022-04-14 DIAGNOSIS — Z12.11 SCREENING FOR COLORECTAL CANCER: ICD-10-CM

## 2022-04-14 DIAGNOSIS — Z12.12 SCREENING FOR COLORECTAL CANCER: ICD-10-CM

## 2022-04-14 PROBLEM — M25.60 JOINT STIFFNESS: Status: ACTIVE | Noted: 2020-02-03

## 2022-04-14 PROBLEM — M75.50 BURSITIS OF SHOULDER: Status: ACTIVE | Noted: 2020-02-03

## 2022-04-14 LAB
BASOPHILS ABSOLUTE: 0 K/UL (ref 0–0.2)
BASOPHILS RELATIVE PERCENT: 0.4 %
CHOLESTEROL, FASTING: 226 MG/DL (ref 0–199)
CREATININE URINE: 214.8 MG/DL (ref 39–259)
EOSINOPHILS ABSOLUTE: 0.2 K/UL (ref 0–0.6)
EOSINOPHILS RELATIVE PERCENT: 1.7 %
HCT VFR BLD CALC: 49.6 % (ref 40.5–52.5)
HDLC SERPL-MCNC: 37 MG/DL (ref 40–60)
HEMOGLOBIN: 16.9 G/DL (ref 13.5–17.5)
LDL CHOLESTEROL CALCULATED: 141 MG/DL
LYMPHOCYTES ABSOLUTE: 1.8 K/UL (ref 1–5.1)
LYMPHOCYTES RELATIVE PERCENT: 19.9 %
MCH RBC QN AUTO: 30.7 PG (ref 26–34)
MCHC RBC AUTO-ENTMCNC: 34.1 G/DL (ref 31–36)
MCV RBC AUTO: 90 FL (ref 80–100)
MICROALBUMIN UR-MCNC: 3 MG/DL
MICROALBUMIN/CREAT UR-RTO: 14 MG/G (ref 0–30)
MONOCYTES ABSOLUTE: 0.9 K/UL (ref 0–1.3)
MONOCYTES RELATIVE PERCENT: 9.7 %
NEUTROPHILS ABSOLUTE: 6.2 K/UL (ref 1.7–7.7)
NEUTROPHILS RELATIVE PERCENT: 68.3 %
PDW BLD-RTO: 13.1 % (ref 12.4–15.4)
PLATELET # BLD: 191 K/UL (ref 135–450)
PMV BLD AUTO: 9.4 FL (ref 5–10.5)
PROSTATE SPECIFIC ANTIGEN: 2.13 NG/ML (ref 0–4)
RBC # BLD: 5.52 M/UL (ref 4.2–5.9)
TRIGLYCERIDE, FASTING: 238 MG/DL (ref 0–150)
TSH REFLEX: 3.39 UIU/ML (ref 0.27–4.2)
URIC ACID, SERUM: 7.6 MG/DL (ref 3.5–7.2)
VLDLC SERPL CALC-MCNC: 48 MG/DL
WBC # BLD: 9.1 K/UL (ref 4–11)

## 2022-04-14 PROCEDURE — G0439 PPPS, SUBSEQ VISIT: HCPCS | Performed by: INTERNAL MEDICINE

## 2022-04-14 PROCEDURE — 81002 URINALYSIS NONAUTO W/O SCOPE: CPT | Performed by: INTERNAL MEDICINE

## 2022-04-14 ASSESSMENT — PATIENT HEALTH QUESTIONNAIRE - PHQ9
SUM OF ALL RESPONSES TO PHQ QUESTIONS 1-9: 0
2. FEELING DOWN, DEPRESSED OR HOPELESS: 0
SUM OF ALL RESPONSES TO PHQ QUESTIONS 1-9: 0
SUM OF ALL RESPONSES TO PHQ QUESTIONS 1-9: 0
1. LITTLE INTEREST OR PLEASURE IN DOING THINGS: 0
SUM OF ALL RESPONSES TO PHQ QUESTIONS 1-9: 0
SUM OF ALL RESPONSES TO PHQ QUESTIONS 1-9: 0
2. FEELING DOWN, DEPRESSED OR HOPELESS: 0
SUM OF ALL RESPONSES TO PHQ QUESTIONS 1-9: 0
SUM OF ALL RESPONSES TO PHQ QUESTIONS 1-9: 0
1. LITTLE INTEREST OR PLEASURE IN DOING THINGS: 0
SUM OF ALL RESPONSES TO PHQ9 QUESTIONS 1 & 2: 0
SUM OF ALL RESPONSES TO PHQ QUESTIONS 1-9: 0
SUM OF ALL RESPONSES TO PHQ9 QUESTIONS 1 & 2: 0

## 2022-04-14 ASSESSMENT — LIFESTYLE VARIABLES: HOW OFTEN DO YOU HAVE A DRINK CONTAINING ALCOHOL: NEVER

## 2022-04-14 NOTE — PROGRESS NOTES
Medicare Annual Wellness Visit    Esther Loredo is here for Medicare AWV    Assessment & Plan   Medicare annual wellness visit, subsequent  -     PSA, Prostatic Specific Antigen; Future  Type 2 diabetes mellitus without complication, without long-term current use of insulin (HCC)  -      DIABETES FOOT EXAM  -     Hemoglobin A1C; Future  -     CBC with Auto Differential; Future  -     MICROALBUMIN / CREATININE URINE RATIO; Future  -     POCT Urinalysis no Micro  -     Uric Acid; Future  -     TSH with Reflex; Future  Mixed hyperlipidemia  Anxiety  Essential hypertension  -     TSH with Reflex; Future  Dizziness  H/O Prinzmetal angina  Screening for colorectal cancer  -     POCT FECAL IMMUNOCHEMICAL TEST (FIT); Future  Screening for hyperlipidemia  -     Lipid, Fasting; Future      Recommendations for Preventive Services Due: see orders and patient instructions/AVS.  Recommended screening schedule for the next 5-10 years is provided to the patient in written form: see Patient Instructions/AVS.     Return in 6 months (on 10/14/2022) for htn dm . Subjective        74 y.o.. Male with known h.o DM, HTN, Hyperlipidemia, anxiety here for annual exam    Since last time pt dizziness symptoms improved a lot and not taking meclizine any more unless needed for a bad day   Reports some falls when he suddenly gets up or changes his position but no injuries       Reports he has chronic left hearing loss and now completely lost   Right hearing is suboptimal and just had hearing aids and doing better     DM- 2-  Worsened with dietary habits and recent steroids . But changed diet and lost weight and home sugars are below 130 now on glipizide 2.5 mg bid  Compliant with diet and checking. No low sugars   No tingling numbness in feet  No blurry vision   Has not seen EYE MD yet      HTn - compliant with meds.  On metoprolol, hyzaar- off norvasc for itching  No edema    Hyperlipidemia - on fenofibrate and unable to tolerate statins, on fish oil , fenofibrate for low HDL -  developed skin rash and stopped fenofibrate       Anxiety on xanax using only as needed for chest pains which helps. Cut down from TID to half tab prn now    Does not smoke or chew anymore    Lives with wife  Independent of ADL and IADL         The following acute and/or chronic problems were also addressed today:  dizziness    Patient's complete Health Risk Assessment and screening values have been reviewed and are found in Flowsheets. The following problems were reviewed today and where indicated follow up appointments were made and/or referrals ordered.     Positive Risk Factor Screenings with Interventions:    Fall Risk:  Do you feel unsteady or are you worried about falling? : (!) yes  2 or more falls in past year?: no  Fall with injury in past year?: no     Fall Risk Interventions:    · Home safety tips provided              General Health and ACP:  General  In general, how would you say your health is?: Good  In the past 7 days, have you experienced any of the following: New or Increased Pain, New or Increased Fatigue, Loneliness, Social Isolation, Stress or Anger?: No  Do you get the social and emotional support that you need?: Yes  Do you have a Living Will?: (!) No    Advance Directives     Power of  Living Will ACP-Advance Directive ACP-Power of     Not on File Not on File Not on File Not on File      General Health Risk Interventions:  · No Living Will: Advance Care Planning addressed with patient today    Health Habits/Nutrition:     Physical Activity:     Days of Exercise per Week: Not on file    Minutes of Exercise per Session: Not on file     Have you lost any weight without trying in the past 3 months?: No  Body mass index: (!) 32  Have you seen the dentist within the past year?: Yes    Health Habits/Nutrition Interventions:  · none needed    Hearing/Vision:  Do you or your family notice any trouble with your hearing that hasn't been managed with hearing aids?: (!) Yes  Do you have difficulty driving, watching TV, or doing any of your daily activities because of your eyesight?: No  Have you had an eye exam within the past year?: (!) No  No exam data present    Hearing/Vision Interventions:  · Hearing concerns:  has hearing aids    Safety:  Do you have working smoke detectors?: Yes  Do you have any tripping hazards - loose or unsecured carpets or rugs?: (!) Yes  Do you have any tripping hazards - clutter in doorways, halls, or stairs?: No  Do you have either shower bars, grab bars, non-slip mats or non-slip surfaces in your shower or bathtub?: (!) No  Do all of your stairways have a railing or banister?: Yes  Do you always fasten your seatbelt when you are in a car?: (!) No    Safety Interventions:  · Home safety tips provided           Objective   Vitals:    04/14/22 1008   BP: 135/75   Pulse: 70   Resp: 18   Weight: 236 lb (107 kg)   Height: 6' (1.829 m)      Body mass index is 32.01 kg/m². General: eldelry male, healthy appearing    Awake, alert and oriented. Appears to be not in any distress  Mucous Membranes:  Pink , anicteric  Neck: No JVD, no carotid bruit, no thyromegaly  Chest:  Clear to auscultation bilaterally, no added sounds  Cardiovascular:  RRR S1S2 heard, no murmurs or gallops  Abdomen:  Soft, undistended, non tender, no organomegaly, BS present  Extremities: No edema or cyanosis. Distal pulses well felt  Neuro - non focal except for hearing loss   Has hearing aids now   Visual inspection:  Deformity/amputation: absent  Skin lesions/pre-ulcerative calluses: absent  Edema: right- negative, left- negative    Sensory exam:  Monofilament sensation: normal  (minimum of 5 random plantar locations tested, avoiding callused areas - > 1 area with absence of sensation is + for neuropathy)    Plus at least one of the following:  Pulses: normal,   Pinprick: Intact  Proprioception- intact  Vibration (128 Hz):  Intact            Allergies Allergen Reactions    Metformin And Related     Statins      Muscle cramps  Muscle cramps     Prior to Visit Medications    Medication Sig Taking? Authorizing Provider   blood glucose test strips (ACCU-CHEK GUIDE) strip USETO TEST BLOOD SUGAR ONCE DAILY IN THE MORNING  Brenda Carrasquillo MD   glipiZIDE (GLUCOTROL XL) 2.5 MG extended release tablet Take 1 tablet by mouth twice daily  Brenda Carrasquillo MD   ALPRAZolam Danney Matteo) 1 MG tablet TAKE 1/2 (ONE-HALF) TABLET BY MOUTH NIGHTLY AS NEEDED FOR SLEEP FOR 30 DAYS  Brenda Carrasquillo MD   meclizine (ANTIVERT) 25 MG tablet Take 1 tablet by mouth 3 times daily as needed for Dizziness  Brenda Carrasquillo MD   metoprolol succinate (TOPROL XL) 100 MG extended release tablet Take 1 tablet by mouth daily  Brenda Carrasquillo MD   lansoprazole (PREVACID) 30 MG delayed release capsule Take 1 capsule by mouth daily  Brenda Carrasquillo MD   escitalopram (LEXAPRO) 10 MG tablet Take 1 tablet by mouth daily  Brenda Carrasquillo MD   losartan-hydroCHLOROthiazide (HYZAAR) 100-12.5 MG per tablet Take 1 tablet by mouth once daily  Brenda Carrasquillo MD   Blood Glucose Monitoring Suppl (ACCU-CHEK GUIDE ME) w/Device KIT Use to test blood sugars once every morning. DX: E11.9  Brenda Carrasquillo MD   Lancets MISC Use to test blood sugars once every morning. DX: E11.9  Brenda Carrasquillo MD   aspirin 81 MG tablet Take 1 tablet by mouth daily  Brenda Carrasquillo MD     [facility] Readings from Last 3 Encounters:   04/14/22 236 lb (107 kg)   12/06/21 239 lb (108.4 kg)   09/14/21 230 lb (104.3 kg)       Assessment:       Diagnosis Orders   1. Medicare annual wellness visit, subsequent  PSA, Prostatic Specific Antigen   2.  Type 2 diabetes mellitus without complication, without long-term current use of insulin (HCC)   DIABETES FOOT EXAM    Hemoglobin A1C    CBC with Auto Differential    MICROALBUMIN / CREATININE URINE RATIO    POCT Urinalysis no Micro    Uric Acid    TSH with Reflex   3. Mixed hyperlipidemia     4. Anxiety     5. Essential hypertension  TSH with Reflex   6. Dizziness     7. H/O Prinzmetal angina     8. Screening for colorectal cancer  POCT FECAL IMMUNOCHEMICAL TEST (FIT)   9. Screening for hyperlipidemia  Lipid, Fasting           Plan:           DM - 2 - did not tolerate metformin  Now doing diet and on glipizide 2.5 mg bid  improved sugars. Continue diet and exercise  Keep off metformin for now  Yearly eye exam recommended  Continue ARB. HTN- stable on current meds- hyzaar,metoprolol  off norvasc     Hyperlipidemia - on fish oil, stopped fenofibrate with myalgias  Continue same meds, last LDL at 123   Repeat labs needed    Prinzmetal angina -   Always gets better with xanax 0.25 mg half dose   Resolved now , prn xanax and daily lexapro       Anxiety- on xanax half tab  prn , no abuse noted  Uses as needed only, oarrs being monitored    OA of right shoulder- f/w Dr. Suresh Wilson now s/p steroid injection by pain mx in [facility]  Not on any meds    Dizziness - sec to labrynthitis - improved- off steroids but intermittent symptoms coming back. Fall precautions discussed    Hearing loss - now has hearing aids    Seasonal Allergies - on Claritin prn  Need living will  Seen ENT, dentist, need eye exam this year  Seen dermtology      Need living will    Need  shingles vaccine   Pneumonia up to date      Had colonoscopy with polyp removal 2019    [facility] (Including outside providers/suppliers regularly involved in providing care):   Patient Care Team:  Jose Miguel German MD as PCP - General  Jose Miguel German MD as PCP - [facility] Empaneled Provider    Reviewed and updated this visit:  Tobacco  Allergies  Meds  Problems  Med Hx  Surg Hx  Soc Hx  Fam Hx                  Advance Care Planning   Advanced Care Planning: Discussed the patients choices for care and treatment in case of a health event that adversely affects decision-making abilities.  Also discussed the patients long-term treatment options. Reviewed with the patient the appropriate state-specific advance directive documents. Reviewed the process of designating a competent adult as an Agent (or -in-fact) that could take make health care decisions for the patient if incompetent. Patient was asked to complete the declaration forms, either acknowledge the forms by a public notary or an eligible witness and provide a signed copy to the practice office. Time spent (minutes): 2 min     Obesity Counseling: Assessed behavioral health risks and factors affecting choice of behavior. Suggested weight control approaches, including dietary changes behavioral modification and follow up plan. Provided educational and support documentation.   Time spent (minutes):  2 min

## 2022-04-14 NOTE — PATIENT INSTRUCTIONS
Personalized Preventive Plan for Adalgisa Soto - 4/14/2022  Medicare offers a range of preventive health benefits. Some of the tests and screenings are paid in full while other may be subject to a deductible, co-insurance, and/or copay. Some of these benefits include a comprehensive review of your medical history including lifestyle, illnesses that may run in your family, and various assessments and screenings as appropriate. After reviewing your medical record and screening and assessments performed today your provider may have ordered immunizations, labs, imaging, and/or referrals for you. A list of these orders (if applicable) as well as your Preventive Care list are included within your After Visit Summary for your review. Other Preventive Recommendations:    · A preventive eye exam performed by an eye specialist is recommended every 1-2 years to screen for glaucoma; cataracts, macular degeneration, and other eye disorders. · A preventive dental visit is recommended every 6 months. · Try to get at least 150 minutes of exercise per week or 10,000 steps per day on a pedometer . · Order or download the FREE \"Exercise & Physical Activity: Your Everyday Guide\" from [employer]. Call 3-857.561.3545 or search [employer] online. · You need 6459-8361 mg of calcium and 3595-8245 IU of vitamin D per day. It is possible to meet your calcium requirement with diet alone, but a vitamin D supplement is usually necessary to meet this goal.  · When exposed to the sun, use a sunscreen that protects against both UVA and UVB radiation with an SPF of 30 or greater. Reapply every 2 to 3 hours or after sweating, drying off with a towel, or swimming. · Always wear a seat belt when traveling in a car. Always wear a helmet when riding a bicycle or motorcycle.

## 2022-04-15 LAB
ESTIMATED AVERAGE GLUCOSE: 119.8 MG/DL
HBA1C MFR BLD: 5.8 %

## 2022-04-15 RX ORDER — ROSUVASTATIN CALCIUM 10 MG/1
10 TABLET, COATED ORAL NIGHTLY
Qty: 30 TABLET | Refills: 2 | Status: SHIPPED | OUTPATIENT
Start: 2022-04-15 | End: 2022-05-24

## 2022-04-15 NOTE — TELEPHONE ENCOUNTER
----- Message from Jamie Rutherford MD sent at 4/15/2022 12:59 PM EDT -----  Crestor 10 mg nightly   Avoid yellow part in eggs  ----- Message -----  From: Sintia Colón  Sent: 4/15/2022   9:31 AM EDT  To: Jamie Rutherford MD    Patient is willing to start cholesterol medication. Please advise. Spouse wanting to know if eggs are good for patient's diet?     [address] [place] [address] J

## 2022-04-28 RX ORDER — GLIPIZIDE 2.5 MG/1
TABLET, EXTENDED RELEASE ORAL
Qty: 180 TABLET | Refills: 0 | Status: SHIPPED | OUTPATIENT
Start: 2022-04-28 | End: 2022-07-11

## 2022-05-24 RX ORDER — ROSUVASTATIN CALCIUM 10 MG/1
10 TABLET, COATED ORAL NIGHTLY
Qty: 90 TABLET | Refills: 0 | Status: SHIPPED | OUTPATIENT
Start: 2022-05-24 | End: 2022-07-11

## 2022-07-05 ENCOUNTER — PROCEDURE VISIT (OUTPATIENT)
Dept: AUDIOLOGY | Age: 67
End: 2022-07-05

## 2022-07-05 DIAGNOSIS — H90.3 SENSORINEURAL HEARING LOSS, BILATERAL: Primary | ICD-10-CM

## 2022-07-05 PROCEDURE — 99999 PR OFFICE/OUTPT VISIT,PROCEDURE ONLY: CPT | Performed by: AUDIOLOGIST

## 2022-07-05 NOTE — PROGRESS NOTES
Trae Lin  [birth year]74 y.o. male   [id number]    HEARING AID CHECK    Trae Lin was seen today, 7/5/2022, for a hearing aid check appointment, accompanied by spouse. RIGHT EAR: /MODEL: Jeffrey Cohen [id number]  SN: [id number]  WIRE/DOME: 2(P)/Medium Power    HAF: 8/31/2021  WARRANTY: 11/16/2024     BUTTONS: volume control  PROGRAMS: Auto-Sense  PHONE CONNECTIVITY: N/A    PROCEDURES:     Patient noted intermittent  wire. Hearing aids were cleaned and checked. A listening check revealed poor function of  wire. Replaced with length 3 power  for the time being. Called [employer] for replacement length 2 power  (confirmation number [id number]). Microphone and  ports were suctioned and device completed a desiccant cycle through [employer]. Post-cleaning listening check revealed good function of the device. Patient will  replacement  at the [facility] office. PATIENT EDUCATION:     - Verbally and visually reviewed importance of consistent use and care and maintenance of devices. Information was verbally shared with patient during appointment. RECOMMENDATIONS:     - Continue consistent hearing aid use  - Return for hearing aid checks as needed  - Retest hearing as medically indicated and/or sooner if a change in hearing is noted. - Contact audiologist with questions/concerns as needed    **No charge for today's appointment; patient under service warranty through 08/2022.          Eunice Hay  Audiologist

## 2022-07-07 ENCOUNTER — PROCEDURE VISIT (OUTPATIENT)
Dept: AUDIOLOGY | Age: 67
End: 2022-07-07

## 2022-07-07 DIAGNOSIS — H90.3 SENSORINEURAL HEARING LOSS, BILATERAL: Primary | ICD-10-CM

## 2022-07-07 PROCEDURE — 99999 PR OFFICE/OUTPT VISIT,PROCEDURE ONLY: CPT | Performed by: AUDIOLOGIST

## 2022-07-10 DIAGNOSIS — F41.9 ANXIETY: ICD-10-CM

## 2022-07-11 RX ORDER — ROSUVASTATIN CALCIUM 10 MG/1
10 TABLET, COATED ORAL NIGHTLY
Qty: 90 TABLET | Refills: 0 | Status: SHIPPED | OUTPATIENT
Start: 2022-07-11 | End: 2022-10-20 | Stop reason: SDUPTHER

## 2022-07-11 RX ORDER — ALPRAZOLAM 1 MG/1
TABLET ORAL
Qty: 15 TABLET | Refills: 2 | Status: SHIPPED | OUTPATIENT
Start: 2022-07-11 | End: 2022-10-08

## 2022-07-11 RX ORDER — GLIPIZIDE 2.5 MG/1
TABLET, EXTENDED RELEASE ORAL
Qty: 180 TABLET | Refills: 0 | Status: SHIPPED | OUTPATIENT
Start: 2022-07-11 | End: 2022-10-18

## 2022-07-25 RX ORDER — METOPROLOL SUCCINATE 100 MG/1
TABLET, EXTENDED RELEASE ORAL
Qty: 90 TABLET | Refills: 1 | Status: SHIPPED | OUTPATIENT
Start: 2022-07-25

## 2022-08-16 RX ORDER — BLOOD SUGAR DIAGNOSTIC
STRIP MISCELLANEOUS
Qty: 100 EACH | Refills: 0 | Status: SHIPPED | OUTPATIENT
Start: 2022-08-16

## 2022-10-18 RX ORDER — GLIPIZIDE 2.5 MG/1
TABLET, EXTENDED RELEASE ORAL
Qty: 180 TABLET | Refills: 0 | Status: SHIPPED | OUTPATIENT
Start: 2022-10-18

## 2022-10-18 RX ORDER — LANSOPRAZOLE 30 MG/1
CAPSULE, DELAYED RELEASE ORAL
Qty: 90 CAPSULE | Refills: 0 | Status: SHIPPED | OUTPATIENT
Start: 2022-10-18 | End: 2022-10-20 | Stop reason: ALTCHOICE

## 2022-10-20 ENCOUNTER — OFFICE VISIT (OUTPATIENT)
Dept: INTERNAL MEDICINE CLINIC | Age: 67
End: 2022-10-20

## 2022-10-20 VITALS
HEART RATE: 65 BPM | WEIGHT: 240 LBS | HEIGHT: 72 IN | SYSTOLIC BLOOD PRESSURE: 138 MMHG | DIASTOLIC BLOOD PRESSURE: 75 MMHG | RESPIRATION RATE: 18 BRPM | BODY MASS INDEX: 32.51 KG/M2

## 2022-10-20 DIAGNOSIS — F41.9 ANXIETY: ICD-10-CM

## 2022-10-20 DIAGNOSIS — E11.9 TYPE 2 DIABETES MELLITUS WITHOUT COMPLICATION, WITHOUT LONG-TERM CURRENT USE OF INSULIN (HCC): Primary | ICD-10-CM

## 2022-10-20 DIAGNOSIS — I10 HYPERTENSION, ESSENTIAL: ICD-10-CM

## 2022-10-20 DIAGNOSIS — Z86.79 H/O PRINZMETAL ANGINA: ICD-10-CM

## 2022-10-20 DIAGNOSIS — K21.9 GASTROESOPHAGEAL REFLUX DISEASE WITHOUT ESOPHAGITIS: ICD-10-CM

## 2022-10-20 DIAGNOSIS — E78.2 MIXED HYPERLIPIDEMIA: ICD-10-CM

## 2022-10-20 DIAGNOSIS — E11.9 TYPE 2 DIABETES MELLITUS WITHOUT COMPLICATION, WITHOUT LONG-TERM CURRENT USE OF INSULIN (HCC): ICD-10-CM

## 2022-10-20 DIAGNOSIS — Z23 NEED FOR INFLUENZA VACCINATION: ICD-10-CM

## 2022-10-20 LAB
A/G RATIO: 2.8 (ref 1.1–2.2)
ALBUMIN SERPL-MCNC: 5.1 G/DL (ref 3.4–5)
ALP BLD-CCNC: 60 U/L (ref 40–129)
ALT SERPL-CCNC: 30 U/L (ref 10–40)
ANION GAP SERPL CALCULATED.3IONS-SCNC: 15 MMOL/L (ref 3–16)
AST SERPL-CCNC: 28 U/L (ref 15–37)
BILIRUB SERPL-MCNC: 0.6 MG/DL (ref 0–1)
BUN BLDV-MCNC: 19 MG/DL (ref 7–20)
CALCIUM SERPL-MCNC: 9.6 MG/DL (ref 8.3–10.6)
CHLORIDE BLD-SCNC: 100 MMOL/L (ref 99–110)
CO2: 25 MMOL/L (ref 21–32)
CREAT SERPL-MCNC: 0.8 MG/DL (ref 0.8–1.3)
GFR SERPL CREATININE-BSD FRML MDRD: >60 ML/MIN/{1.73_M2}
GLUCOSE BLD-MCNC: 132 MG/DL (ref 70–99)
POTASSIUM SERPL-SCNC: 4.2 MMOL/L (ref 3.5–5.1)
SODIUM BLD-SCNC: 140 MMOL/L (ref 136–145)
TOTAL PROTEIN: 6.9 G/DL (ref 6.4–8.2)

## 2022-10-20 PROCEDURE — 1036F TOBACCO NON-USER: CPT | Performed by: INTERNAL MEDICINE

## 2022-10-20 PROCEDURE — 3017F COLORECTAL CA SCREEN DOC REV: CPT | Performed by: INTERNAL MEDICINE

## 2022-10-20 PROCEDURE — 1123F ACP DISCUSS/DSCN MKR DOCD: CPT | Performed by: INTERNAL MEDICINE

## 2022-10-20 PROCEDURE — 90662 IIV NO PRSV INCREASED AG IM: CPT | Performed by: INTERNAL MEDICINE

## 2022-10-20 PROCEDURE — G8484 FLU IMMUNIZE NO ADMIN: HCPCS | Performed by: INTERNAL MEDICINE

## 2022-10-20 PROCEDURE — 99212 OFFICE O/P EST SF 10 MIN: CPT | Performed by: INTERNAL MEDICINE

## 2022-10-20 PROCEDURE — G8417 CALC BMI ABV UP PARAM F/U: HCPCS | Performed by: INTERNAL MEDICINE

## 2022-10-20 PROCEDURE — G8427 DOCREV CUR MEDS BY ELIG CLIN: HCPCS | Performed by: INTERNAL MEDICINE

## 2022-10-20 PROCEDURE — 2022F DILAT RTA XM EVC RTNOPTHY: CPT | Performed by: INTERNAL MEDICINE

## 2022-10-20 PROCEDURE — G0008 ADMIN INFLUENZA VIRUS VAC: HCPCS | Performed by: INTERNAL MEDICINE

## 2022-10-20 PROCEDURE — 3044F HG A1C LEVEL LT 7.0%: CPT | Performed by: INTERNAL MEDICINE

## 2022-10-20 RX ORDER — ROSUVASTATIN CALCIUM 10 MG/1
10 TABLET, COATED ORAL NIGHTLY
Qty: 90 TABLET | Refills: 0 | Status: SHIPPED | OUTPATIENT
Start: 2022-10-20

## 2022-10-20 RX ORDER — LOSARTAN POTASSIUM AND HYDROCHLOROTHIAZIDE 12.5; 1 MG/1; MG/1
TABLET ORAL
Qty: 90 TABLET | Refills: 2 | Status: SHIPPED | OUTPATIENT
Start: 2022-10-20

## 2022-10-20 NOTE — PROGRESS NOTES
Subjective:      Patient ID: Caryle Roch is a 69 y.o. male. HPI    69 y.o.. Male with known h.o HTN, Hyperlipidemia, anxiety here for regular f.w     Since last time pt dizziness symptoms improved a lot and not taking meclizine any more unless needed  No falls    Reports he has chronic left hearing loss and now completely lost   Right hearing is suboptimal and just had hearing aids and doing better     DM- 2-  Worsened with dietary habits and recent steroids . But changed diet and lost weight and home sugars are below 140 now on glipizide 2.5 mg bid  Compliant with diet and checking. No low sugars   No tingling numbness in feet  No blurry vision   Has not seen EYE MD yet      HTn - compliant with meds. On metoprolol, hyzaar- off norvasc for itching  No edema    Hyperlipidemia - on fenofibrate and unable to tolerate statins,  on fish oil , fenofibrate for low   Recently resumed crestor but stopped again     HDL -  developed skin rash and stopped fenofibrate       Anxiety on xanax using only as needed for chest pains which helps. Cut down from TID to half tab prn now    Does not smoke or chew anymore    Lives with wife  Independent of ADL and IADL     Allergies   Allergen Reactions    Metformin And Related     Statins      Muscle cramps  Muscle cramps       Current Outpatient Medications   Medication Sig Dispense Refill    losartan-hydroCHLOROthiazide (HYZAAR) 100-12.5 MG per tablet Take 1 tablet by mouth once daily 90 tablet 2    rosuvastatin (CRESTOR) 10 MG tablet Take 1 tablet by mouth nightly 90 tablet 0    glipiZIDE (GLUCOTROL XL) 2.5 MG extended release tablet Take 1 tablet by mouth twice daily 180 tablet 0    blood glucose test strips (ACCU-CHEK GUIDE) strip USE 1  IN THE MORNING.   DX:E11.9 100 each 0    metoprolol succinate (TOPROL XL) 100 MG extended release tablet Take 1 tablet by mouth once daily 90 tablet 1    ALPRAZolam (XANAX) 1 MG tablet TAKE 1/2 (ONE-HALF) TABLET BY MOUTH NIGHTLY AS NEEDED FOR SLEEP 15 tablet 2    meclizine (ANTIVERT) 25 MG tablet Take 1 tablet by mouth 3 times daily as needed for Dizziness 30 tablet 0    Blood Glucose Monitoring Suppl (ACCU-CHEK GUIDE ME) w/Device KIT Use to test blood sugars once every morning. DX: E11.9 1 kit 0    Lancets MISC Use to test blood sugars once every morning. DX: E11.9 100 each 3    aspirin 81 MG tablet Take 1 tablet by mouth daily 30 tablet 0     No current facility-administered medications for this visit. Review of Systems     Constitutional: Negative for fatigue. HENT: Negative for ear discharge, hearing loss and trouble swallowing. Respiratory: Negative for chest tightness. Cardiovascular: +ve  chest pain and palpitations. Gastrointestinal: Negative for abdominal pain. Endocrine: Negative for polyuria. Genitourinary: Negative for frequency, hematuria and testicular pain. Musculoskeletal: Negative for myalgias. Neurological: Negative for light-headedness. Psychiatric/Behavioral: Negative for decreased concentration and sleep disturbance. The patient is nervous/anxious. There are no changes to past medical history, family history, social history or review of systems(except as noted in the history section) since prior note (all reviewed with patient). Objective:   Physical Exam  Vitals:    10/20/22 1050   BP: 138/75   Pulse: 65   Resp: 18           General: eldelry male, healthy appearing    Awake, alert and oriented. Appears to be not in any distress  Mucous Membranes:  Pink , anicteric  Neck: No JVD, no carotid bruit, no thyromegaly  Chest:  Clear to auscultation bilaterally, no added sounds  Cardiovascular:  RRR S1S2 heard, no murmurs or gallops  Abdomen:  Soft, undistended, non tender, no organomegaly, BS present  Extremities: No edema or cyanosis.  Distal pulses well felt  Neuro - non focal except for hearing loss   Has hearing aids now     Wt Readings from Last 3 Encounters:   10/20/22 240 lb (108.9 kg) 04/14/22 236 lb (107 kg)   12/06/21 239 lb (108.4 kg)       Assessment:       Diagnosis Orders   1. Type 2 diabetes mellitus without complication, without long-term current use of insulin ([facility] [address]        2. Hypertension, essential        3. Mixed hyperlipidemia        4. Gastroesophageal reflux disease without esophagitis        5. Anxiety        6. H/O Prinzmetal angina        7. Need for influenza vaccination  Influenza, FLUZONE HIGH-DOSE, (age 72 y+), IM, Preservative Free, 0.7 mL              Plan:         DM - 2 - did not tolerate metformin  Now doing diet and on glipizide 2.5 mg bid  improved sugars. Continue diet and exercise  Keep off metformin for now  A1c at 5.8  Yearly eye exam recommended- done   Continue ARB. HTN- stable on current meds- hyzaar,metoprolol  off norvasc     Hyperlipidemia - on fish oil, stopped fenofibrate /statins with myalgias  Continue same meds, last LDL at 140  Recommend to try half crestor    Prinzmetal angina -   Always gets better with xanax 0.25 mg half dose   Resolved now , prn xanax and off  lexapro       Anxiety- on xanax half tab  prn , no abuse noted  Uses as needed only, oarrs being monitored      Dizziness - sec to labrynthitis - improved- off steroids but intermittent symptoms coming back.  Fall precautions discussed    Hearing loss - now has hearing aids    Seasonal Allergies - on Claritin prn  Need living will  Seen ENT, dentist, had eye exam this year  Seen dermtology  Refused covid vaccines     Need living will    Need  shingles vaccine   Pneumonia vaccine up to date  Had colonoscopy with polyp removal 2019

## 2022-10-21 LAB
ESTIMATED AVERAGE GLUCOSE: 128.4 MG/DL
HBA1C MFR BLD: 6.1 %

## 2022-11-16 RX ORDER — BLOOD SUGAR DIAGNOSTIC
STRIP MISCELLANEOUS
Qty: 100 EACH | Refills: 0 | Status: SHIPPED | OUTPATIENT
Start: 2022-11-16

## 2022-11-22 ENCOUNTER — PROCEDURE VISIT (OUTPATIENT)
Dept: AUDIOLOGY | Age: 67
End: 2022-11-22

## 2022-11-22 DIAGNOSIS — H90.3 SENSORINEURAL HEARING LOSS, BILATERAL: Primary | ICD-10-CM

## 2022-11-22 PROCEDURE — 92592 PR HEARING AID CHECK, ONE EAR: CPT | Performed by: AUDIOLOGIST

## 2022-11-22 NOTE — PROGRESS NOTES
Chica Brennan  [birth year]68 y.o. male   [id number]    HEARING AID CHECK    Chica Brennan was seen today, 11/22/2022, for a hearing aid check appointment. RIGHT EAR: /MODELThereasa Imelda P70-R  SN: [id number]  WIRE/DOME: 2(P)/Medium Power    HAF: 8/31/2021  WARRANTY: 11/16/2024    BUTTONS: volume control  PROGRAMS: Auto-Sense  PHONE CONNECTIVITY: N/A    PROCEDURES:     Otoscopy revealed: Clear ear canals bilaterally    Patient noted distortion and static intermittently from his right aid. Microphone and  ports were suctioned, domes and wax filter were changed, and devices completed a desiccant cycle through [email or web address]. Post-cleaning listening check revealed good function of the device. Patient reports wearing his aid when working outside, and states he perspires significantly when working. The aid had symptoms of moisture exposure, and significant improvement was noted following desiccant cycle. PATIENT EDUCATION:     - Verbally and visually reviewed importance of consistent use and care and maintenance of devices. Information was verbally shared with patient during appointment. Discussed the difference between his service plan of 1 year and his warranty of 3 years. He is no longer under his service plan. RECOMMENDATIONS:     Continue consistent hearing aid use  Return for hearing aid checks as needed  Retest hearing as medically indicated and/or sooner if a change in hearing is noted. Contact audiologist with questions/concerns as needed      **Patient paid $60 for today's appointment.     Eunice Fritz  Audiologist

## 2022-12-15 DIAGNOSIS — F41.9 ANXIETY: ICD-10-CM

## 2022-12-15 RX ORDER — ALPRAZOLAM 1 MG/1
TABLET ORAL
Qty: 15 TABLET | Refills: 0 | Status: SHIPPED | OUTPATIENT
Start: 2022-12-15 | End: 2023-01-14

## 2022-12-20 ENCOUNTER — TELEPHONE (OUTPATIENT)
Dept: INTERNAL MEDICINE CLINIC | Age: 67
End: 2022-12-20

## 2022-12-20 RX ORDER — BENZONATATE 100 MG/1
100 CAPSULE ORAL 3 TIMES DAILY PRN
Qty: 21 CAPSULE | Refills: 0 | Status: SHIPPED | OUTPATIENT
Start: 2022-12-20 | End: 2022-12-27

## 2022-12-20 NOTE — TELEPHONE ENCOUNTER
----- Message from Vivien Carmona MD sent at 12/20/2022  2:17 PM EST -----  Contact: Tarik Hardy [phone]  Because he is at high risk for covid symptoms - HTN< DM, age male, heart disease      ----- Message -----  From: Lizzie Louis  Sent: 12/20/2022  12:54 PM EST  To: Vivien Carmona MD    Spouse is wanting to know why you want him to take the Paxlovid, but you told her to hold off on taking it?   ----- Message -----  From: Vivien Carmona MD  Sent: 12/20/2022  12:31 PM EST  To: Lizzie Louis    If willing can start paxlovid 20 mg tid x 5 days  Monitor finger sats  Tessalon pearls  Hold statins    ----- Message -----  From: Alondra Ledezma  Sent: 12/20/2022   8:39 AM EST  To: Vivien Carmona MD    Spouse tested positive for Covid and states she spoke to you and you instructed her to inform you if her spouse developed any symptoms. He started on 12/17/22 with fever, productive cough, chills, body aches. He has been taking Tylenol and Mucinex.      [address] [phone]    Thank you

## 2022-12-29 ENCOUNTER — TELEPHONE (OUTPATIENT)
Dept: INTERNAL MEDICINE CLINIC | Age: 67
End: 2022-12-29

## 2022-12-29 ENCOUNTER — HOSPITAL ENCOUNTER (OUTPATIENT)
Age: 67
Discharge: HOME | End: 2022-12-29
Payer: MEDICARE

## 2022-12-29 ENCOUNTER — HOSPITAL ENCOUNTER (OUTPATIENT)
Dept: GENERAL RADIOLOGY | Age: 67
Discharge: HOME | End: 2022-12-29
Payer: MEDICARE

## 2022-12-29 DIAGNOSIS — R05.9 COUGH, UNSPECIFIED TYPE: ICD-10-CM

## 2022-12-29 DIAGNOSIS — R05.9 COUGH, UNSPECIFIED TYPE: Primary | ICD-10-CM

## 2022-12-29 PROCEDURE — 71046 X-RAY EXAM CHEST 2 VIEWS: CPT

## 2022-12-29 NOTE — TELEPHONE ENCOUNTER
----- Message from Coy Lennox, MD sent at 12/29/2022  3:07 PM EST -----  Contact: spouse [phone]  Order chest x-ray.  ----- Message -----  From: Daysi Nelson  Sent: 12/29/2022   1:38 PM EST  To: Coy Lennox, MD    Patient had fever of 101.3 this morning. Has chills, coughing, sneezing. O2 was 93-94%.    ----- Message -----  From: Reynold Townsend  Sent: 12/29/2022  12:57 PM EST  To: Geena Parry    Check to see if patient has fever, SOB. How is his oxygen?-per Dr Cynthia Whitfield   ----- Message -----  From: Reynold Townsend  Sent: 12/29/2022   9:39 AM EST  To: Coy Lennox, MD Dr Dyke Overland patient was given Paxlovid last week. He finished that, but has started coughing again. Patient has a few tessalon pearls left and is taking mucinex. Spouse wanting to know if there is anything else patient can do? Please advise.

## 2022-12-30 ENCOUNTER — TELEPHONE (OUTPATIENT)
Dept: INTERNAL MEDICINE CLINIC | Age: 67
End: 2022-12-30

## 2022-12-30 NOTE — TELEPHONE ENCOUNTER
----- Message from Kevin Moy sent at 12/30/2022  3:10 PM EST -----  Nothing, continue what he is doing per Dr Bienvenido Guzman  ----- Message -----  From: Kevin Moy  Sent: 12/30/2022   2:12 PM EST  To: Gui Baxter MD    Patient's spouse wanting to know if patient needs to do anything else besides continue mucinex and tessalon perles? He finished paxlovid on Sunday.  ----- Message -----  From: Gui Baxter MD  Sent: 12/30/2022   2:03 PM EST  To: Carol Ballesteros    CXR normal  ----- Message -----  From: Kevin Moy  Sent: 12/30/2022   1:33 PM EST  To: MD Dr. Kathia Pisano Patient's spouse requesting chest xray results.

## 2023-01-10 ENCOUNTER — TELEPHONE (OUTPATIENT)
Dept: ENT CLINIC | Age: 68
End: 2023-01-10

## 2023-01-10 NOTE — TELEPHONE ENCOUNTER
Patient stated that his hearing aid is getting worse from last check. States that they think the wire needs replaced that was discussed at previous [address]. Wife wanting to know if it can be dropped off to the office tomorrow for repair?  Please advise Viola Morse [phone]

## 2023-01-12 RX ORDER — METOPROLOL SUCCINATE 100 MG/1
TABLET, EXTENDED RELEASE ORAL
Qty: 90 TABLET | Refills: 0 | Status: SHIPPED | OUTPATIENT
Start: 2023-01-12

## 2023-02-01 DIAGNOSIS — F41.9 ANXIETY: ICD-10-CM

## 2023-02-02 RX ORDER — ALPRAZOLAM 1 MG/1
TABLET ORAL
Qty: 15 TABLET | Refills: 0 | Status: SHIPPED | OUTPATIENT
Start: 2023-02-02 | End: 2023-03-03

## 2023-02-02 RX ORDER — GLIPIZIDE 2.5 MG/1
TABLET, EXTENDED RELEASE ORAL
Qty: 180 TABLET | Refills: 0 | Status: SHIPPED | OUTPATIENT
Start: 2023-02-02

## 2023-04-23 DIAGNOSIS — F41.9 ANXIETY: ICD-10-CM

## 2023-04-24 RX ORDER — ALPRAZOLAM 1 MG/1
TABLET ORAL
Qty: 15 TABLET | Refills: 0 | Status: SHIPPED | OUTPATIENT
Start: 2023-04-24 | End: 2023-05-24

## 2023-05-08 RX ORDER — GLIPIZIDE 2.5 MG/1
TABLET, EXTENDED RELEASE ORAL
Qty: 180 TABLET | Refills: 0 | Status: SHIPPED | OUTPATIENT
Start: 2023-05-08

## 2023-05-10 ENCOUNTER — OFFICE VISIT (OUTPATIENT)
Dept: INTERNAL MEDICINE CLINIC | Age: 68
End: 2023-05-10

## 2023-05-10 VITALS
RESPIRATION RATE: 18 BRPM | HEIGHT: 72 IN | WEIGHT: 240 LBS | HEART RATE: 65 BPM | DIASTOLIC BLOOD PRESSURE: 78 MMHG | BODY MASS INDEX: 32.51 KG/M2 | SYSTOLIC BLOOD PRESSURE: 135 MMHG

## 2023-05-10 DIAGNOSIS — Z00.00 MEDICARE ANNUAL WELLNESS VISIT, SUBSEQUENT: ICD-10-CM

## 2023-05-10 DIAGNOSIS — Z00.00 MEDICARE ANNUAL WELLNESS VISIT, SUBSEQUENT: Primary | ICD-10-CM

## 2023-05-10 DIAGNOSIS — I10 HYPERTENSION, ESSENTIAL: ICD-10-CM

## 2023-05-10 DIAGNOSIS — E78.2 MIXED HYPERLIPIDEMIA: ICD-10-CM

## 2023-05-10 DIAGNOSIS — E11.9 TYPE 2 DIABETES MELLITUS WITHOUT COMPLICATION, WITHOUT LONG-TERM CURRENT USE OF INSULIN (HCC): ICD-10-CM

## 2023-05-10 DIAGNOSIS — K21.9 GASTROESOPHAGEAL REFLUX DISEASE WITHOUT ESOPHAGITIS: ICD-10-CM

## 2023-05-10 DIAGNOSIS — Z86.79 H/O PRINZMETAL ANGINA: ICD-10-CM

## 2023-05-10 LAB
ALBUMIN SERPL-MCNC: 4.7 G/DL (ref 3.4–5)
ALBUMIN/GLOB SERPL: 2.4 {RATIO} (ref 1.1–2.2)
ALP SERPL-CCNC: 62 U/L (ref 40–129)
ALT SERPL-CCNC: 24 U/L (ref 10–40)
ANION GAP SERPL CALCULATED.3IONS-SCNC: 14 MMOL/L (ref 3–16)
AST SERPL-CCNC: 19 U/L (ref 15–37)
BASOPHILS # BLD: 0 K/UL (ref 0–0.2)
BASOPHILS NFR BLD: 0.7 %
BILIRUB SERPL-MCNC: 0.4 MG/DL (ref 0–1)
BILIRUBIN, POC: NORMAL
BLOOD URINE, POC: NORMAL
BUN SERPL-MCNC: 17 MG/DL (ref 7–20)
CALCIUM SERPL-MCNC: 9.2 MG/DL (ref 8.3–10.6)
CHLORIDE SERPL-SCNC: 104 MMOL/L (ref 99–110)
CHOLEST SERPL-MCNC: 202 MG/DL (ref 0–199)
CLARITY, POC: NORMAL
CO2 SERPL-SCNC: 23 MMOL/L (ref 21–32)
COLOR, POC: NORMAL
CREAT SERPL-MCNC: 0.8 MG/DL (ref 0.8–1.3)
CREAT UR-MCNC: 178.4 MG/DL (ref 39–259)
DEPRECATED RDW RBC AUTO: 13.2 % (ref 12.4–15.4)
EOSINOPHIL # BLD: 0.2 K/UL (ref 0–0.6)
EOSINOPHIL NFR BLD: 2.7 %
GFR SERPLBLD CREATININE-BSD FMLA CKD-EPI: >60 ML/MIN/{1.73_M2}
GLUCOSE SERPL-MCNC: 122 MG/DL (ref 70–99)
GLUCOSE URINE, POC: NORMAL
HCT VFR BLD AUTO: 46 % (ref 40.5–52.5)
HDLC SERPL-MCNC: 32 MG/DL (ref 40–60)
HGB BLD-MCNC: 15.6 G/DL (ref 13.5–17.5)
KETONES, POC: NORMAL
LDL CHOLESTEROL CALCULATED: 121 MG/DL
LEUKOCYTE EST, POC: NORMAL
LYMPHOCYTES # BLD: 2 K/UL (ref 1–5.1)
LYMPHOCYTES NFR BLD: 28.7 %
MCH RBC QN AUTO: 30.2 PG (ref 26–34)
MCHC RBC AUTO-ENTMCNC: 33.9 G/DL (ref 31–36)
MCV RBC AUTO: 89.3 FL (ref 80–100)
MICROALBUMIN UR DL<=1MG/L-MCNC: 1.4 MG/DL
MICROALBUMIN/CREAT UR: 7.8 MG/G (ref 0–30)
MONOCYTES # BLD: 0.7 K/UL (ref 0–1.3)
MONOCYTES NFR BLD: 9.8 %
NEUTROPHILS # BLD: 4 K/UL (ref 1.7–7.7)
NEUTROPHILS NFR BLD: 58.1 %
NITRITE, POC: NORMAL
PH, POC: NORMAL
PLATELET # BLD AUTO: 169 K/UL (ref 135–450)
PMV BLD AUTO: 9.8 FL (ref 5–10.5)
POTASSIUM SERPL-SCNC: 4.1 MMOL/L (ref 3.5–5.1)
PROT SERPL-MCNC: 6.7 G/DL (ref 6.4–8.2)
PROTEIN, POC: NORMAL
PSA SERPL DL<=0.01 NG/ML-MCNC: 2.64 NG/ML (ref 0–4)
RBC # BLD AUTO: 5.15 M/UL (ref 4.2–5.9)
SODIUM SERPL-SCNC: 141 MMOL/L (ref 136–145)
SPECIFIC GRAVITY, POC: NORMAL
TRIGL SERPL-MCNC: 246 MG/DL (ref 0–150)
TSH SERPL DL<=0.005 MIU/L-ACNC: 3.09 UIU/ML (ref 0.27–4.2)
URATE SERPL-MCNC: 7.3 MG/DL (ref 3.5–7.2)
UROBILINOGEN, POC: NORMAL
VLDLC SERPL CALC-MCNC: 49 MG/DL
WBC # BLD AUTO: 6.9 K/UL (ref 4–11)

## 2023-05-10 PROCEDURE — 3046F HEMOGLOBIN A1C LEVEL >9.0%: CPT | Performed by: INTERNAL MEDICINE

## 2023-05-10 PROCEDURE — 1123F ACP DISCUSS/DSCN MKR DOCD: CPT | Performed by: INTERNAL MEDICINE

## 2023-05-10 PROCEDURE — 3075F SYST BP GE 130 - 139MM HG: CPT | Performed by: INTERNAL MEDICINE

## 2023-05-10 PROCEDURE — 3078F DIAST BP <80 MM HG: CPT | Performed by: INTERNAL MEDICINE

## 2023-05-10 PROCEDURE — 81002 URINALYSIS NONAUTO W/O SCOPE: CPT | Performed by: INTERNAL MEDICINE

## 2023-05-10 PROCEDURE — 3017F COLORECTAL CA SCREEN DOC REV: CPT | Performed by: INTERNAL MEDICINE

## 2023-05-10 PROCEDURE — G0439 PPPS, SUBSEQ VISIT: HCPCS | Performed by: INTERNAL MEDICINE

## 2023-05-10 RX ORDER — LOSARTAN POTASSIUM AND HYDROCHLOROTHIAZIDE 12.5; 1 MG/1; MG/1
TABLET ORAL
Qty: 90 TABLET | Refills: 2 | Status: SHIPPED | OUTPATIENT
Start: 2023-05-10

## 2023-05-10 RX ORDER — METOPROLOL SUCCINATE 100 MG/1
100 TABLET, EXTENDED RELEASE ORAL DAILY
Qty: 90 TABLET | Refills: 2 | Status: SHIPPED | OUTPATIENT
Start: 2023-05-10

## 2023-05-10 RX ORDER — ROSUVASTATIN CALCIUM 10 MG/1
10 TABLET, COATED ORAL NIGHTLY
Qty: 90 TABLET | Refills: 0 | Status: SHIPPED | OUTPATIENT
Start: 2023-05-10

## 2023-05-10 ASSESSMENT — PATIENT HEALTH QUESTIONNAIRE - PHQ9
SUM OF ALL RESPONSES TO PHQ9 QUESTIONS 1 & 2: 0
SUM OF ALL RESPONSES TO PHQ QUESTIONS 1-9: 0
SUM OF ALL RESPONSES TO PHQ9 QUESTIONS 1 & 2: 0
1. LITTLE INTEREST OR PLEASURE IN DOING THINGS: 0
SUM OF ALL RESPONSES TO PHQ QUESTIONS 1-9: 0
1. LITTLE INTEREST OR PLEASURE IN DOING THINGS: 0
2. FEELING DOWN, DEPRESSED OR HOPELESS: 0
SUM OF ALL RESPONSES TO PHQ QUESTIONS 1-9: 0
2. FEELING DOWN, DEPRESSED OR HOPELESS: 0
SUM OF ALL RESPONSES TO PHQ QUESTIONS 1-9: 0

## 2023-05-10 ASSESSMENT — LIFESTYLE VARIABLES
HOW OFTEN DO YOU HAVE A DRINK CONTAINING ALCOHOL: NEVER
HOW MANY STANDARD DRINKS CONTAINING ALCOHOL DO YOU HAVE ON A TYPICAL DAY: PATIENT DOES NOT DRINK

## 2023-05-10 NOTE — PATIENT INSTRUCTIONS
from [employer] on [address] [facility]. Call [phone] or search The "[employer] Data on Aging online. You need 5773-8441 mg of calcium and 8757-0162 IU of vitamin D per day. It is possible to meet your calcium requirement with diet alone, but a vitamin D supplement is usually necessary to meet this goal.  When exposed to the sun, use a sunscreen that protects against both UVA and UVB radiation with an SPF of 30 or greater. Reapply every 2 to 3 hours or after sweating, drying off with a towel, or swimming. Always wear a seat belt when traveling in a car. Always wear a helmet when riding a bicycle or motorcycle.

## 2023-05-11 LAB
EST. AVERAGE GLUCOSE BLD GHB EST-MCNC: 122.6 MG/DL
HBA1C MFR BLD: 5.9 %

## 2023-07-03 DIAGNOSIS — F41.9 ANXIETY: ICD-10-CM

## 2023-07-04 RX ORDER — GLIPIZIDE 2.5 MG/1
TABLET, EXTENDED RELEASE ORAL
Qty: 180 TABLET | Refills: 0 | Status: SHIPPED | OUTPATIENT
Start: 2023-07-04

## 2023-07-04 RX ORDER — ALPRAZOLAM 1 MG/1
TABLET ORAL
Qty: 15 TABLET | Refills: 0 | Status: SHIPPED | OUTPATIENT
Start: 2023-07-04 | End: 2023-08-02

## 2023-08-08 RX ORDER — ESCITALOPRAM OXALATE 10 MG/1
TABLET ORAL
Qty: 90 TABLET | Refills: 1 | Status: SHIPPED | OUTPATIENT
Start: 2023-08-08

## 2023-08-12 DIAGNOSIS — F41.9 ANXIETY: ICD-10-CM

## 2023-08-14 RX ORDER — ALPRAZOLAM 1 MG/1
TABLET ORAL
Qty: 15 TABLET | Refills: 0 | Status: SHIPPED | OUTPATIENT
Start: 2023-08-14 | End: 2023-09-13

## 2023-08-15 ENCOUNTER — TELEPHONE (OUTPATIENT)
Dept: INTERNAL MEDICINE CLINIC | Age: 68
End: 2023-08-15

## 2023-08-15 NOTE — TELEPHONE ENCOUNTER
Spoke with Pager, insurance states they do not require this. Yue Simmons informed.     Ref #: [id number]

## 2023-08-15 NOTE — TELEPHONE ENCOUNTER
----- Message from Amadou Funez MD sent at 8/9/2023  2:20 PM EDT -----  Ok to do    ----- Message -----  From: Mariano Jason  Sent: 8/9/2023  12:24 PM EDT  To: Amadou Funez MD    [facility] would like another referral dating back a year so they can get insurance to cover  wiliamhannah phone number is [phone] their fax is [phone]

## 2023-09-13 ENCOUNTER — TELEPHONE (OUTPATIENT)
Dept: INTERNAL MEDICINE CLINIC | Age: 68
End: 2023-09-13

## 2023-09-13 NOTE — TELEPHONE ENCOUNTER
----- Message from Gennaro Cooks, MD sent at 9/13/2023  2:31 PM EDT -----  Contact: Hannah Shyam [phone]  Will dw pt next visit    ----- Message -----  From: Brooklyn Hopson  Sent: 9/13/2023  12:41 PM EDT  To: Gennaro Cooks, MD    [employer] calling to suggest the patient be put on Statin due to diabetic cardiovascular risk.   Please advise

## 2023-10-03 DIAGNOSIS — F41.9 ANXIETY: ICD-10-CM

## 2023-10-03 RX ORDER — ALPRAZOLAM 1 MG/1
TABLET ORAL
Qty: 15 TABLET | Refills: 0 | Status: SHIPPED | OUTPATIENT
Start: 2023-10-03 | End: 2023-11-02

## 2023-11-06 RX ORDER — GLIPIZIDE 2.5 MG/1
TABLET, EXTENDED RELEASE ORAL
Qty: 180 TABLET | Refills: 0 | Status: SHIPPED | OUTPATIENT
Start: 2023-11-06

## 2023-11-24 ENCOUNTER — TELEPHONE (OUTPATIENT)
Dept: PHARMACY | Facility: CLINIC | Age: 68
End: 2023-11-24

## 2023-11-24 NOTE — TELEPHONE ENCOUNTER
[facility] HE[facility] CLINICAL PHARMACY: STATIN THERAPY REVIEW  Identified statin use in persons with diabetes care gap per [facility]. Records dated: 11/19/23. Last Visit: 5/10/23  Next Visit: 11/28/23     [facility] IDENTIFIED    Patient is prescribed: rosuvastatin 10mg daily  Per Reconcile Dispense History and Insurer Portal: last filled on 10/20/22 for 90 day supply. Per chart:  Reordered 5/10/23 for #90, 0 refills  Statin allergy/intolerance listed from 2014 of muscle cramps  5/10/23 PCP OV notes: \"Recently resumed crestor but stopped again for myalgias . .. Recommend to try half crestor\"    Allergies   Allergen Reactions    Metformin And Related     Statins      Muscle cramps  Muscle cramps     Lab Results   Component Value Date    CHOL 222 (H) 12/04/2018    TRIG 331 (H) 12/04/2018    HDL 32 (L) 05/10/2023    LDLCALC 121 (H) 05/10/2023    LDLDIRECT 122 (H) 06/22/2020     ALT   Date Value Ref Range Status   05/10/2023 24 10 - 40 U/L Final     AST   Date Value Ref Range Status   05/10/2023 19 15 - 37 U/L Final     The 10-year ASCVD risk score (Darian DK, et al., 2019) is: 46.1%    Values used to calculate the score:      Age: 77 years      Sex: Male      Is Non- : No      Diabetic: Yes      Tobacco smoker: No      Systolic Blood Pressure: 233 mmHg      Is BP treated: Yes      HDL Cholesterol: 32 mg/dL      Total Cholesterol: 202 mg/dL       PLAN  The following are interventions that have been identified:  Statin Gap (Diabetes): rosuvastatin 20mg daily on medication list, but last filled in 2022? Per 5/10/23 OV - did he try half tab to see if that helped with muscle side effects? Reached patient's wife to review, states she manages patient's medications. Reports patient has been off rosuvastatin - states they thought PCP told patient to stop the rosuvastatin at May appt, so never tried half tablet daily.  Spouse reports patient's muscle symptoms are much improved with not taking the

## 2023-11-27 NOTE — TELEPHONE ENCOUNTER
Woody Klein MD, appointment with you 11/28/23 - identified with a care gap for diabetes without a statin   Per wife, NOT taking rosuvastatin due to muscle side effects, which have much improved while off rosuvastatin. Reports they thought you told patient to stop rosuvastatin at the May appt, so he never tried just a half tablet each day. An approved CMS dx code may be entered into a billable office visit to exclude this patient from this care gap. Please use the following CMS allowable diagnoses codes as a visit diagnosis:  Statin myopathy (G72.0)    Please let me know if you have any questions!   Thank you,  [facility] Job, PharmD, [address] [place] [address], toll free: [phone], option 1

## 2023-11-28 ENCOUNTER — OFFICE VISIT (OUTPATIENT)
Dept: INTERNAL MEDICINE CLINIC | Age: 68
End: 2023-11-28

## 2023-11-28 VITALS
HEART RATE: 62 BPM | RESPIRATION RATE: 18 BRPM | DIASTOLIC BLOOD PRESSURE: 75 MMHG | BODY MASS INDEX: 32.1 KG/M2 | HEIGHT: 72 IN | WEIGHT: 237 LBS | SYSTOLIC BLOOD PRESSURE: 135 MMHG

## 2023-11-28 DIAGNOSIS — E11.9 TYPE 2 DIABETES MELLITUS WITHOUT COMPLICATION, WITHOUT LONG-TERM CURRENT USE OF INSULIN (HCC): ICD-10-CM

## 2023-11-28 DIAGNOSIS — K21.9 GASTROESOPHAGEAL REFLUX DISEASE WITHOUT ESOPHAGITIS: ICD-10-CM

## 2023-11-28 DIAGNOSIS — Z86.79 H/O PRINZMETAL ANGINA: ICD-10-CM

## 2023-11-28 DIAGNOSIS — F41.9 ANXIETY: ICD-10-CM

## 2023-11-28 DIAGNOSIS — Z23 NEED FOR INFLUENZA VACCINATION: ICD-10-CM

## 2023-11-28 DIAGNOSIS — E78.2 MIXED HYPERLIPIDEMIA: ICD-10-CM

## 2023-11-28 DIAGNOSIS — I10 HYPERTENSION, ESSENTIAL: Primary | ICD-10-CM

## 2023-11-28 LAB
ALBUMIN SERPL-MCNC: 4.9 G/DL (ref 3.4–5)
ALBUMIN/GLOB SERPL: 2 {RATIO} (ref 1.1–2.2)
ALP SERPL-CCNC: 62 U/L (ref 40–129)
ALT SERPL-CCNC: 21 U/L (ref 10–40)
ANION GAP SERPL CALCULATED.3IONS-SCNC: 15 MMOL/L (ref 3–16)
AST SERPL-CCNC: 17 U/L (ref 15–37)
BILIRUB SERPL-MCNC: 0.4 MG/DL (ref 0–1)
BUN SERPL-MCNC: 18 MG/DL (ref 7–20)
CALCIUM SERPL-MCNC: 9.4 MG/DL (ref 8.3–10.6)
CHLORIDE SERPL-SCNC: 98 MMOL/L (ref 99–110)
CO2 SERPL-SCNC: 25 MMOL/L (ref 21–32)
CREAT SERPL-MCNC: 1 MG/DL (ref 0.8–1.3)
GFR SERPLBLD CREATININE-BSD FMLA CKD-EPI: >60 ML/MIN/{1.73_M2}
GLUCOSE SERPL-MCNC: 132 MG/DL (ref 70–99)
POTASSIUM SERPL-SCNC: 4.3 MMOL/L (ref 3.5–5.1)
PROT SERPL-MCNC: 7.3 G/DL (ref 6.4–8.2)
SODIUM SERPL-SCNC: 138 MMOL/L (ref 136–145)

## 2023-11-28 PROCEDURE — 2022F DILAT RTA XM EVC RTNOPTHY: CPT | Performed by: INTERNAL MEDICINE

## 2023-11-28 PROCEDURE — 99212 OFFICE O/P EST SF 10 MIN: CPT | Performed by: INTERNAL MEDICINE

## 2023-11-28 PROCEDURE — 90662 IIV NO PRSV INCREASED AG IM: CPT | Performed by: INTERNAL MEDICINE

## 2023-11-28 PROCEDURE — 1036F TOBACCO NON-USER: CPT | Performed by: INTERNAL MEDICINE

## 2023-11-28 PROCEDURE — G8417 CALC BMI ABV UP PARAM F/U: HCPCS | Performed by: INTERNAL MEDICINE

## 2023-11-28 PROCEDURE — 3044F HG A1C LEVEL LT 7.0%: CPT | Performed by: INTERNAL MEDICINE

## 2023-11-28 PROCEDURE — G0008 ADMIN INFLUENZA VIRUS VAC: HCPCS | Performed by: INTERNAL MEDICINE

## 2023-11-28 PROCEDURE — 3017F COLORECTAL CA SCREEN DOC REV: CPT | Performed by: INTERNAL MEDICINE

## 2023-11-28 PROCEDURE — 3078F DIAST BP <80 MM HG: CPT | Performed by: INTERNAL MEDICINE

## 2023-11-28 PROCEDURE — 1123F ACP DISCUSS/DSCN MKR DOCD: CPT | Performed by: INTERNAL MEDICINE

## 2023-11-28 PROCEDURE — G8484 FLU IMMUNIZE NO ADMIN: HCPCS | Performed by: INTERNAL MEDICINE

## 2023-11-28 PROCEDURE — 3075F SYST BP GE 130 - 139MM HG: CPT | Performed by: INTERNAL MEDICINE

## 2023-11-28 PROCEDURE — G8427 DOCREV CUR MEDS BY ELIG CLIN: HCPCS | Performed by: INTERNAL MEDICINE

## 2023-11-28 RX ORDER — MECLIZINE HYDROCHLORIDE 25 MG/1
25 TABLET ORAL 3 TIMES DAILY PRN
Qty: 30 TABLET | Refills: 0 | Status: SHIPPED | OUTPATIENT
Start: 2023-11-28

## 2023-11-28 RX ORDER — METOPROLOL SUCCINATE 100 MG/1
100 TABLET, EXTENDED RELEASE ORAL DAILY
Qty: 90 TABLET | Refills: 2 | Status: SHIPPED | OUTPATIENT
Start: 2023-11-28

## 2023-11-28 RX ORDER — ESCITALOPRAM OXALATE 10 MG/1
10 TABLET ORAL DAILY
Qty: 90 TABLET | Refills: 1 | Status: SHIPPED | OUTPATIENT
Start: 2023-11-28

## 2023-11-28 RX ORDER — LOSARTAN POTASSIUM AND HYDROCHLOROTHIAZIDE 12.5; 1 MG/1; MG/1
TABLET ORAL
Qty: 90 TABLET | Refills: 2 | Status: SHIPPED | OUTPATIENT
Start: 2023-11-28

## 2023-11-28 RX ORDER — GLIPIZIDE 2.5 MG/1
2.5 TABLET, EXTENDED RELEASE ORAL 2 TIMES DAILY
Qty: 180 TABLET | Refills: 0 | Status: SHIPPED | OUTPATIENT
Start: 2023-11-28

## 2023-11-28 NOTE — PROGRESS NOTES
Subjective:      Patient ID: Nithya Mckeon is a 77 y.o. male. HPI    77 y.o.. Male with known h.o HTN, Hyperlipidemia, anxiety here for regular f.w     Since last time pt had dental workup and doing better    dizziness symptoms improved a lot and not taking meclizine any more unless needed  No falls    Reports he has chronic left hearing loss and now completely lost   Right hearing is suboptimal and just had hearing aids and doing better     DM- 2-  Worsened with dietary habits and recent steroids . But changed diet and lost weight and home sugars are below 140 now on glipizide 2.5 mg bid  Compliant with diet and checking. No low sugars   No tingling numbness in feet  No blurry vision    EYE exam due now       HTn - compliant with meds. On metoprolol, hyzaar- off norvasc for itching  No edema    Hyperlipidemia - on fenofibrate and unable to tolerate statins,  on fish oil , fenofibrate for low   Recently resumed crestor but stopped again     HDL -  developed skin rash and stopped fenofibrate       Anxiety on xanax using only as needed for chest pains which helps.  Cut down from TID to half tab prn now    Does not smoke or chew anymore    Lives with wife  Independent of ADL and IADL     Allergies   Allergen Reactions    Metformin And Related     Statins      Muscle cramps  Muscle cramps       Current Outpatient Medications   Medication Sig Dispense Refill    escitalopram (LEXAPRO) 10 MG tablet Take 1 tablet by mouth daily 90 tablet 1    glipiZIDE (GLUCOTROL XL) 2.5 MG extended release tablet Take 1 tablet by mouth 2 times daily 180 tablet 0    losartan-hydroCHLOROthiazide (HYZAAR) 100-12.5 MG per tablet Take 1 tablet by mouth once daily 90 tablet 2    meclizine (ANTIVERT) 25 MG tablet Take 1 tablet by mouth 3 times daily as needed for Dizziness 30 tablet 0    metoprolol succinate (TOPROL XL) 100 MG extended release tablet Take 1 tablet by mouth daily 90 tablet 2    ALPRAZolam (XANAX) 1 MG tablet TAKE 1/2

## 2023-11-29 LAB
EST. AVERAGE GLUCOSE BLD GHB EST-MCNC: 128.4 MG/DL
HBA1C MFR BLD: 6.1 %

## 2023-12-05 NOTE — TELEPHONE ENCOUNTER
Katelin Khan MD, appointment with you 11/28/23 - identified with a care gap for diabetes without a statin   Per wife, NOT taking rosuvastatin due to muscle side effects, which have much improved while off rosuvastatin. Reports they thought you told patient to stop rosuvastatin at the May appt, so he never tried just a half tablet each day. An approved CMS dx code may be entered into a billable office visit to exclude this patient from this care gap. Please use the following CMS allowable diagnoses codes as a visit diagnosis:  Statin myopathy (G72.0)     Please let me know if you have any questions! Thank you,  Lonny Gaines, PharmD, [address] [place] [address], toll free: [phone], option 1    =======================================================   [facility]     For Pharmacy Admin Tracking Only    Program: Lucila in place:  No  Recommendation Provided To: Provider: 1 via Note to Provider  Intervention Accepted By: Provider: 0  Gap Closed?: No   Time Spent (min): 15

## 2024-01-03 ENCOUNTER — TELEPHONE (OUTPATIENT)
Dept: INTERNAL MEDICINE CLINIC | Age: 69
End: 2024-01-03

## 2024-01-03 RX ORDER — BENZONATATE 100 MG/1
100 CAPSULE ORAL 3 TIMES DAILY PRN
Qty: 30 CAPSULE | Refills: 0 | Status: SHIPPED | OUTPATIENT
Start: 2024-01-03 | End: 2024-01-13

## 2024-01-03 RX ORDER — AZITHROMYCIN 250 MG/1
TABLET, FILM COATED ORAL
Qty: 1 PACKET | Refills: 0 | Status: SHIPPED | OUTPATIENT
Start: 2024-01-03

## 2024-01-03 NOTE — TELEPHONE ENCOUNTER
----- Message from James Pruitt MD sent at 1/3/2024 11:37 AM EST -----  Contact: [phone]  Start on z pack if no allergies  Tessalon pearls    Also try mucinex   See us if not better    ----- Message -----  From: Anca Cruz MA  Sent: 1/3/2024  10:52 AM EST  To: James Pruitt MD    Patients wife called and states the patient has had a cold for over 1 week. His symptoms include a semi-productive cough and head and chest congestion. He has taken Mucinex, Tylenol Cold, and Coricetin HBP and nothing has helped improve the symptoms. He has not taken a COVID test either. Wife is wanting to know if we can call in something stronger than the OTC meds.       Pharmacy:     [facility] in [place]        UPDATE:     Pt has tested negative for COVID

## 2024-01-08 ENCOUNTER — OFFICE VISIT (OUTPATIENT)
Dept: INTERNAL MEDICINE CLINIC | Age: 69
End: 2024-01-08

## 2024-01-08 VITALS
HEIGHT: 72 IN | RESPIRATION RATE: 18 BRPM | HEART RATE: 65 BPM | BODY MASS INDEX: 32.91 KG/M2 | DIASTOLIC BLOOD PRESSURE: 75 MMHG | SYSTOLIC BLOOD PRESSURE: 130 MMHG | WEIGHT: 243 LBS

## 2024-01-08 DIAGNOSIS — J18.9 COMMUNITY ACQUIRED PNEUMONIA OF RIGHT LOWER LOBE OF LUNG: Primary | ICD-10-CM

## 2024-01-08 PROCEDURE — G8427 DOCREV CUR MEDS BY ELIG CLIN: HCPCS | Performed by: INTERNAL MEDICINE

## 2024-01-08 PROCEDURE — 99212 OFFICE O/P EST SF 10 MIN: CPT | Performed by: INTERNAL MEDICINE

## 2024-01-08 PROCEDURE — 3075F SYST BP GE 130 - 139MM HG: CPT | Performed by: INTERNAL MEDICINE

## 2024-01-08 PROCEDURE — G8484 FLU IMMUNIZE NO ADMIN: HCPCS | Performed by: INTERNAL MEDICINE

## 2024-01-08 PROCEDURE — 1123F ACP DISCUSS/DSCN MKR DOCD: CPT | Performed by: INTERNAL MEDICINE

## 2024-01-08 PROCEDURE — 3017F COLORECTAL CA SCREEN DOC REV: CPT | Performed by: INTERNAL MEDICINE

## 2024-01-08 PROCEDURE — 1036F TOBACCO NON-USER: CPT | Performed by: INTERNAL MEDICINE

## 2024-01-08 PROCEDURE — G8417 CALC BMI ABV UP PARAM F/U: HCPCS | Performed by: INTERNAL MEDICINE

## 2024-01-08 PROCEDURE — 3078F DIAST BP <80 MM HG: CPT | Performed by: INTERNAL MEDICINE

## 2024-01-08 RX ORDER — PREDNISONE 20 MG/1
20 TABLET ORAL DAILY
Qty: 7 TABLET | Refills: 0 | Status: SHIPPED | OUTPATIENT
Start: 2024-01-08 | End: 2024-01-15

## 2024-01-08 RX ORDER — LEVOFLOXACIN 500 MG/1
500 TABLET, FILM COATED ORAL DAILY
Qty: 7 TABLET | Refills: 0 | Status: SHIPPED | OUTPATIENT
Start: 2024-01-08 | End: 2024-01-15

## 2024-01-08 RX ORDER — BENZONATATE 100 MG/1
100 CAPSULE ORAL 3 TIMES DAILY PRN
Qty: 30 CAPSULE | Refills: 0 | Status: SHIPPED | OUTPATIENT
Start: 2024-01-08 | End: 2024-01-18

## 2024-01-08 NOTE — PROGRESS NOTES
Subjective:      Patient ID: Rell Avendaño is a 68 y.o. male.    HPI    68 y.o.. Male with known h.o HTN, Hyperlipidemia, anxiety here for cough x 2 weeks    Reports he started with URI, chest congestion with winter illness like everyone and started with fevers and worsening cough at night, given z pack and tessalon pearls last week, better for 2 days and now with worsening cough. No sob  No fevers but has night time sweats and more cough and wheezing per wife      Allergies   Allergen Reactions    Metformin And Related     Statins      Muscle cramps  Muscle cramps       Current Outpatient Medications   Medication Sig Dispense Refill    levoFLOXacin (LEVAQUIN) 500 MG tablet Take 1 tablet by mouth daily for 7 days 7 tablet 0    predniSONE (DELTASONE) 20 MG tablet Take 1 tablet by mouth daily for 7 days 7 tablet 0    benzonatate (TESSALON PERLES) 100 MG capsule Take 1 capsule by mouth 3 times daily as needed for Cough 30 capsule 0    azithromycin (ZITHROMAX) 250 MG tablet Take 2 tabs (500 MG) on Day 1, and take 1 tab (250 MG) on days 2-5. 1 packet 0    escitalopram (LEXAPRO) 10 MG tablet Take 1 tablet by mouth daily 90 tablet 1    glipiZIDE (GLUCOTROL XL) 2.5 MG extended release tablet Take 1 tablet by mouth 2 times daily 180 tablet 0    losartan-hydroCHLOROthiazide (HYZAAR) 100-12.5 MG per tablet Take 1 tablet by mouth once daily 90 tablet 2    meclizine (ANTIVERT) 25 MG tablet Take 1 tablet by mouth 3 times daily as needed for Dizziness 30 tablet 0    metoprolol succinate (TOPROL XL) 100 MG extended release tablet Take 1 tablet by mouth daily 90 tablet 2    ALPRAZolam (XANAX) 1 MG tablet TAKE 1/2 (ONE-HALF) TABLET BY MOUTH NIGHTLY AS NEEDED FOR SLEEP 15 tablet 0    rosuvastatin (CRESTOR) 10 MG tablet Take 1 tablet by mouth nightly (Patient not taking: Reported on 11/27/2023) 90 tablet 0    blood glucose test strips (ACCU-CHEK GUIDE) strip USE 1 STRIP TO CHECK SUGAR  IN THE MORNING 100 each 0    Blood Glucose

## 2024-01-10 ENCOUNTER — HOSPITAL ENCOUNTER (OUTPATIENT)
Age: 69
Discharge: HOME | End: 2024-01-10
Payer: MEDICARE

## 2024-01-10 ENCOUNTER — HOSPITAL ENCOUNTER (OUTPATIENT)
Dept: GENERAL RADIOLOGY | Age: 69
Discharge: HOME | End: 2024-01-10
Payer: MEDICARE

## 2024-01-10 DIAGNOSIS — J18.9 COMMUNITY ACQUIRED PNEUMONIA OF RIGHT LOWER LOBE OF LUNG: ICD-10-CM

## 2024-01-10 PROCEDURE — 71046 X-RAY EXAM CHEST 2 VIEWS: CPT

## 2024-01-16 RX ORDER — LANSOPRAZOLE 30 MG/1
30 CAPSULE, DELAYED RELEASE ORAL DAILY
Qty: 90 CAPSULE | Refills: 0 | Status: SHIPPED | OUTPATIENT
Start: 2024-01-16

## 2024-01-16 NOTE — TELEPHONE ENCOUNTER
----- Message from Minna Mallory sent at 1/16/2024 12:25 PM EST -----  Contact: Ena [phone]  Spouse requesting refill on lansoprazole (PREVACID) 30 MG delayed release capsule        [facility] [address] [address] - P [phone] - F [phone] [address] [address] [address]  Phone: [phone]  Fax: [phone]

## 2024-01-22 ENCOUNTER — TELEPHONE (OUTPATIENT)
Dept: INTERNAL MEDICINE CLINIC | Age: 69
End: 2024-01-22

## 2024-01-22 RX ORDER — LEVOFLOXACIN 500 MG/1
500 TABLET, FILM COATED ORAL DAILY
Qty: 5 TABLET | Refills: 0 | Status: SHIPPED | OUTPATIENT
Start: 2024-01-22 | End: 2024-01-27

## 2024-01-22 NOTE — TELEPHONE ENCOUNTER
----- Message from James Pruitt MD sent at 1/22/2024  2:25 PM EST -----  Contact: Ena [phone]  Another 5 days of levaquin 500 mg daily  ----- Message -----  From: Minna Mallory  Sent: 1/22/2024  10:13 AM EST  To: James Pruitt MD    Spouse states the medication you prescribed patient levoFLOXacin (LEVAQUIN) 500 MG tablet and predniSONE (DELTASONE) 20 MG tablet cleared him up while on it.  Now patient has taking all the medication and he is having head stopped up, sore throat, and cough again.  Caller didn't know if you may want to prescribe him something before it got bad again?  Please advise        [facility] [address] [place] [address] [address] [address] -  [phone] - F [phone]  [address] [address] [address]  Phone: [phone]  Fax: [phone]

## 2024-01-24 ENCOUNTER — TELEPHONE (OUTPATIENT)
Dept: INTERNAL MEDICINE CLINIC | Age: 69
End: 2024-01-24

## 2024-01-24 NOTE — TELEPHONE ENCOUNTER
----- Message from Eric Del Rio MD sent at 1/24/2024 12:34 PM EST -----  Contact: Ena [phone]  Nothing else at this point   ----- Message -----  From: Minna Mallory  Sent: 1/24/2024  12:18 PM EST  To: Eric Del Rio MD    Spouse states patient started taking antibiotic you prescribed him yesterday.  Last night patient started sounding like he was wheezing, and this morning he only has a whisper.  Caller wanting to know should he take something else with the antibiotic, or what do you recommend?  Please advise        [facility] [address] [place] [address] [address] [address] -  [phone] - F [phone] [address] [address] [address]  Phone: [phone]  Fax: [phone]

## 2024-01-26 ENCOUNTER — ENROLLMENT (OUTPATIENT)
Dept: PHARMACY | Facility: CLINIC | Age: 69
End: 2024-01-26

## 2024-01-26 ENCOUNTER — TELEPHONE (OUTPATIENT)
Dept: INTERNAL MEDICINE CLINIC | Age: 69
End: 2024-01-26

## 2024-01-26 ENCOUNTER — HOSPITAL ENCOUNTER (OUTPATIENT)
Age: 69
Discharge: HOME | End: 2024-01-26
Payer: MEDICARE

## 2024-01-26 DIAGNOSIS — R07.9 CHEST PAIN, UNSPECIFIED TYPE: Primary | ICD-10-CM

## 2024-01-26 DIAGNOSIS — R07.9 CHEST PAIN, UNSPECIFIED TYPE: ICD-10-CM

## 2024-01-26 LAB — D DIMER: 0.27 UG/ML FEU (ref 0–0.6)

## 2024-01-26 PROCEDURE — 36415 COLL VENOUS BLD VENIPUNCTURE: CPT

## 2024-01-26 PROCEDURE — 85379 FIBRIN DEGRADATION QUANT: CPT

## 2024-01-26 RX ORDER — PREDNISONE 20 MG/1
20 TABLET ORAL DAILY
Qty: 5 TABLET | Refills: 0 | Status: SHIPPED | OUTPATIENT
Start: 2024-01-26 | End: 2024-01-31

## 2024-01-26 NOTE — TELEPHONE ENCOUNTER
----- Message from James Pruitt MD sent at 1/26/2024 10:24 AM EST -----  Contact: Ena [phone]  Unlikely to have pneumonia again as he used   2 rounds of antibiotics  If pain is bad would look for blood clot in lung, obtain D dimer quantitative , at [facility] stat      ----- Message -----  From: Minna Mallory  Sent: 1/26/2024   9:46 AM EST  To: James Pruitt MD    Spouse states patient only has 1 Tablet let of antibiotic left, but patient has not really seen a change.  Patient is now having the pain in lower right side of back again.  Caller concerned about pneumonia.  Caller didn't know if you could see patient today, prescribe him something else, or maybe order another X-ray?  Please advise        [facility] [address] [place] [address] [address] -  [phone] - F [phone] [address] [address] [address]  Phone: [phone]  Fax: [phone]

## 2024-01-26 NOTE — TELEPHONE ENCOUNTER
----- Message from James Pruitt MD sent at 1/26/2024  1:48 PM EST -----  D dimer neg suggesting low risk for blood clot  No need for bigger scan for now  Prednisone 20 mg daily x 5 days. Can rise sugars

## 2024-02-21 ENCOUNTER — TELEPHONE (OUTPATIENT)
Dept: ENT CLINIC | Age: 69
End: 2024-02-21

## 2024-02-21 NOTE — TELEPHONE ENCOUNTER
Patient called states someone call him about cochlear implants and they do not want to proceed will just continue using his hearing aid and call back if they change their minds.

## 2024-04-10 RX ORDER — LANSOPRAZOLE 30 MG/1
30 CAPSULE, DELAYED RELEASE ORAL DAILY
Qty: 90 CAPSULE | Refills: 0 | Status: SHIPPED | OUTPATIENT
Start: 2024-04-10

## 2024-04-30 RX ORDER — GLIPIZIDE 2.5 MG/1
2.5 TABLET, EXTENDED RELEASE ORAL 2 TIMES DAILY
Qty: 180 TABLET | Refills: 0 | Status: SHIPPED | OUTPATIENT
Start: 2024-04-30

## 2024-05-03 DIAGNOSIS — F41.9 ANXIETY: ICD-10-CM

## 2024-05-03 RX ORDER — ALPRAZOLAM 1 MG/1
TABLET ORAL
Qty: 15 TABLET | Refills: 0 | Status: SHIPPED | OUTPATIENT
Start: 2024-05-03 | End: 2024-06-02

## 2024-05-03 NOTE — TELEPHONE ENCOUNTER
----- Message from Minna Mallory sent at 5/3/2024  9:13 AM EDT -----  Contact: Ena [phone]  Spouse requesting refill for patients ALPRAZolam (XANAX) 1 MG tablet.  Spouse states she is going to be going out of town, and would like to pick them up at pharmacy today if at all possible.        [facility] [address] [address] -  [phone] - F [phone]  [address] [place] [id number]  Phone: [phone]  Fax: [phone]

## 2024-05-28 DIAGNOSIS — F41.9 ANXIETY: ICD-10-CM

## 2024-05-29 ENCOUNTER — OFFICE VISIT (OUTPATIENT)
Dept: INTERNAL MEDICINE CLINIC | Age: 69
End: 2024-05-29

## 2024-05-29 VITALS
WEIGHT: 243 LBS | HEIGHT: 72 IN | RESPIRATION RATE: 18 BRPM | SYSTOLIC BLOOD PRESSURE: 135 MMHG | DIASTOLIC BLOOD PRESSURE: 78 MMHG | BODY MASS INDEX: 32.91 KG/M2 | HEART RATE: 65 BPM

## 2024-05-29 DIAGNOSIS — I10 HYPERTENSION, ESSENTIAL: ICD-10-CM

## 2024-05-29 DIAGNOSIS — Z00.00 MEDICARE ANNUAL WELLNESS VISIT, SUBSEQUENT: ICD-10-CM

## 2024-05-29 DIAGNOSIS — Z00.00 MEDICARE ANNUAL WELLNESS VISIT, SUBSEQUENT: Primary | ICD-10-CM

## 2024-05-29 DIAGNOSIS — K21.9 GASTROESOPHAGEAL REFLUX DISEASE WITHOUT ESOPHAGITIS: ICD-10-CM

## 2024-05-29 DIAGNOSIS — E78.2 MIXED HYPERLIPIDEMIA: ICD-10-CM

## 2024-05-29 DIAGNOSIS — E11.9 TYPE 2 DIABETES MELLITUS WITHOUT COMPLICATION, WITHOUT LONG-TERM CURRENT USE OF INSULIN (HCC): ICD-10-CM

## 2024-05-29 DIAGNOSIS — Z86.79 H/O PRINZMETAL ANGINA: ICD-10-CM

## 2024-05-29 LAB
ALBUMIN SERPL-MCNC: 4.7 G/DL (ref 3.4–5)
ALBUMIN/GLOB SERPL: 2.2 {RATIO} (ref 1.1–2.2)
ALP SERPL-CCNC: 58 U/L (ref 40–129)
ALT SERPL-CCNC: 32 U/L (ref 10–40)
ANION GAP SERPL CALCULATED.3IONS-SCNC: 16 MMOL/L (ref 3–16)
AST SERPL-CCNC: 22 U/L (ref 15–37)
BASOPHILS # BLD: 0 K/UL (ref 0–0.2)
BASOPHILS NFR BLD: 0.6 %
BILIRUB SERPL-MCNC: 0.4 MG/DL (ref 0–1)
BILIRUBIN, POC: NORMAL
BLOOD URINE, POC: NORMAL
BUN SERPL-MCNC: 18 MG/DL (ref 7–20)
CALCIUM SERPL-MCNC: 9.5 MG/DL (ref 8.3–10.6)
CHLORIDE SERPL-SCNC: 99 MMOL/L (ref 99–110)
CHOLEST SERPL-MCNC: 225 MG/DL (ref 0–199)
CLARITY, POC: NORMAL
CO2 SERPL-SCNC: 23 MMOL/L (ref 21–32)
COLOR, POC: NORMAL
CREAT SERPL-MCNC: 0.8 MG/DL (ref 0.8–1.3)
CREAT UR-MCNC: 165.4 MG/DL (ref 39–259)
DEPRECATED RDW RBC AUTO: 13 % (ref 12.4–15.4)
EOSINOPHIL # BLD: 0.2 K/UL (ref 0–0.6)
EOSINOPHIL NFR BLD: 3.2 %
GFR SERPLBLD CREATININE-BSD FMLA CKD-EPI: >90 ML/MIN/{1.73_M2}
GLUCOSE SERPL-MCNC: 149 MG/DL (ref 70–99)
GLUCOSE URINE, POC: NORMAL
HCT VFR BLD AUTO: 47.4 % (ref 40.5–52.5)
HDLC SERPL-MCNC: 35 MG/DL (ref 40–60)
HGB BLD-MCNC: 16.2 G/DL (ref 13.5–17.5)
KETONES, POC: NORMAL
LDL CHOLESTEROL: ABNORMAL MG/DL
LDLC SERPL-MCNC: 152 MG/DL
LEUKOCYTE EST, POC: NORMAL
LYMPHOCYTES # BLD: 1.8 K/UL (ref 1–5.1)
LYMPHOCYTES NFR BLD: 23.8 %
MCH RBC QN AUTO: 30.8 PG (ref 26–34)
MCHC RBC AUTO-ENTMCNC: 34.2 G/DL (ref 31–36)
MCV RBC AUTO: 90.3 FL (ref 80–100)
MICROALBUMIN UR DL<=1MG/L-MCNC: 1.8 MG/DL
MICROALBUMIN/CREAT UR: 10.9 MG/G (ref 0–30)
MONOCYTES # BLD: 0.7 K/UL (ref 0–1.3)
MONOCYTES NFR BLD: 8.4 %
NEUTROPHILS # BLD: 4.9 K/UL (ref 1.7–7.7)
NEUTROPHILS NFR BLD: 64 %
NITRITE, POC: NORMAL
PH, POC: NORMAL
PLATELET # BLD AUTO: 182 K/UL (ref 135–450)
PMV BLD AUTO: 9.7 FL (ref 5–10.5)
POTASSIUM SERPL-SCNC: 3.9 MMOL/L (ref 3.5–5.1)
PROT SERPL-MCNC: 6.8 G/DL (ref 6.4–8.2)
PROTEIN, POC: NORMAL
PSA SERPL DL<=0.01 NG/ML-MCNC: 2.9 NG/ML (ref 0–4)
RBC # BLD AUTO: 5.25 M/UL (ref 4.2–5.9)
SODIUM SERPL-SCNC: 138 MMOL/L (ref 136–145)
SPECIFIC GRAVITY, POC: NORMAL
TRIGL SERPL-MCNC: 344 MG/DL (ref 0–150)
TSH SERPL DL<=0.005 MIU/L-ACNC: 2.38 UIU/ML (ref 0.27–4.2)
URATE SERPL-MCNC: 7.6 MG/DL (ref 3.5–7.2)
UROBILINOGEN, POC: NORMAL
VLDLC SERPL CALC-MCNC: ABNORMAL MG/DL
WBC # BLD AUTO: 7.7 K/UL (ref 4–11)

## 2024-05-29 PROCEDURE — 3046F HEMOGLOBIN A1C LEVEL >9.0%: CPT | Performed by: INTERNAL MEDICINE

## 2024-05-29 PROCEDURE — 81002 URINALYSIS NONAUTO W/O SCOPE: CPT | Performed by: INTERNAL MEDICINE

## 2024-05-29 PROCEDURE — 3075F SYST BP GE 130 - 139MM HG: CPT | Performed by: INTERNAL MEDICINE

## 2024-05-29 PROCEDURE — 3017F COLORECTAL CA SCREEN DOC REV: CPT | Performed by: INTERNAL MEDICINE

## 2024-05-29 PROCEDURE — 3078F DIAST BP <80 MM HG: CPT | Performed by: INTERNAL MEDICINE

## 2024-05-29 PROCEDURE — 1123F ACP DISCUSS/DSCN MKR DOCD: CPT | Performed by: INTERNAL MEDICINE

## 2024-05-29 PROCEDURE — G0439 PPPS, SUBSEQ VISIT: HCPCS | Performed by: INTERNAL MEDICINE

## 2024-05-29 RX ORDER — LANSOPRAZOLE 30 MG/1
30 CAPSULE, DELAYED RELEASE ORAL DAILY
Qty: 90 CAPSULE | Refills: 0 | Status: SHIPPED | OUTPATIENT
Start: 2024-05-29

## 2024-05-29 RX ORDER — ALPRAZOLAM 1 MG/1
TABLET ORAL
Qty: 15 TABLET | Refills: 0 | Status: SHIPPED | OUTPATIENT
Start: 2024-06-03 | End: 2024-07-03

## 2024-05-29 RX ORDER — ESCITALOPRAM OXALATE 10 MG/1
10 TABLET ORAL DAILY
Qty: 90 TABLET | Refills: 1 | Status: SHIPPED | OUTPATIENT
Start: 2024-05-29

## 2024-05-29 RX ORDER — LOSARTAN POTASSIUM AND HYDROCHLOROTHIAZIDE 12.5; 1 MG/1; MG/1
TABLET ORAL
Qty: 90 TABLET | Refills: 2 | Status: SHIPPED | OUTPATIENT
Start: 2024-05-29

## 2024-05-29 RX ORDER — ROSUVASTATIN CALCIUM 10 MG/1
10 TABLET, COATED ORAL NIGHTLY
Qty: 90 TABLET | Refills: 0 | Status: SHIPPED | OUTPATIENT
Start: 2024-05-29 | End: 2024-05-30 | Stop reason: SINTOL

## 2024-05-29 RX ORDER — GLIPIZIDE 2.5 MG/1
2.5 TABLET, EXTENDED RELEASE ORAL 2 TIMES DAILY
Qty: 180 TABLET | Refills: 0 | Status: SHIPPED | OUTPATIENT
Start: 2024-05-29

## 2024-05-29 RX ORDER — METOPROLOL SUCCINATE 100 MG/1
100 TABLET, EXTENDED RELEASE ORAL DAILY
Qty: 90 TABLET | Refills: 2 | Status: SHIPPED | OUTPATIENT
Start: 2024-05-29

## 2024-05-29 ASSESSMENT — PATIENT HEALTH QUESTIONNAIRE - PHQ9
SUM OF ALL RESPONSES TO PHQ QUESTIONS 1-9: 0
SUM OF ALL RESPONSES TO PHQ QUESTIONS 1-9: 1
SUM OF ALL RESPONSES TO PHQ QUESTIONS 1-9: 1
1. LITTLE INTEREST OR PLEASURE IN DOING THINGS: SEVERAL DAYS
SUM OF ALL RESPONSES TO PHQ QUESTIONS 1-9: 1
SUM OF ALL RESPONSES TO PHQ QUESTIONS 1-9: 0
SUM OF ALL RESPONSES TO PHQ9 QUESTIONS 1 & 2: 0
SUM OF ALL RESPONSES TO PHQ QUESTIONS 1-9: 1
SUM OF ALL RESPONSES TO PHQ QUESTIONS 1-9: 0
1. LITTLE INTEREST OR PLEASURE IN DOING THINGS: NOT AT ALL
2. FEELING DOWN, DEPRESSED OR HOPELESS: NOT AT ALL
2. FEELING DOWN, DEPRESSED OR HOPELESS: NOT AT ALL
SUM OF ALL RESPONSES TO PHQ9 QUESTIONS 1 & 2: 1
SUM OF ALL RESPONSES TO PHQ QUESTIONS 1-9: 0

## 2024-05-29 ASSESSMENT — LIFESTYLE VARIABLES
HOW MANY STANDARD DRINKS CONTAINING ALCOHOL DO YOU HAVE ON A TYPICAL DAY: PATIENT DOES NOT DRINK
HOW OFTEN DO YOU HAVE A DRINK CONTAINING ALCOHOL: NEVER

## 2024-05-29 NOTE — PROGRESS NOTES
diet and on glipizide 2.5 mg bid  improved sugars.     Continue diet and exercise  Keep off metformin for now  A1c at 5.8 >6.1  Yearly eye exam recommended- done   Continue ARB.     HTN- stable on current meds- hyzaar,metoprolol  off norvasc     Hyperlipidemia - on fish oil, stopped fenofibrate /statins with myalgias  Continue same meds, last LDL at 121  Recommend to try half crestor    Prinzmetal angina -   Always gets better with xanax 0.25 mg half dose   Resolved now , prn xanax and off  lexapro       Anxiety- on xanax half tab  prn , no abuse noted  Uses as needed only, oarrs being monitored      Dizziness - sec to labrynthitis - improved- off steroids but intermittent symptoms coming back. Fall precautions discussed  Prn meclizine    Hearing loss - now has hearing aids- planning for implants    Seasonal Allergies - on Claritin prn  Need living will  Seen ENT, dentist, had eye exam this year  Seen dermtology  Refused covid vaccines     Need living will    Need  shingles vaccine   Pneumonia vaccine up to date  Had colonoscopy with polyp removal 2019     [facility] (Including outside providers/suppliers regularly involved in providing care):   Patient Care Team:  James Pruitt MD as PCP - General  James Pruitt MD as PCP - Empaneled Provider  Zoya Pablo RPH as Pharmacist (Pharmacist)     Reviewed and updated this visit:  Tobacco  Allergies  Meds  Problems  Med Hx  Surg Hx  Soc Hx  Fam Hx

## 2024-05-30 LAB
EST. AVERAGE GLUCOSE BLD GHB EST-MCNC: 134.1 MG/DL
HBA1C MFR BLD: 6.3 %

## 2024-05-30 RX ORDER — PRAVASTATIN SODIUM 20 MG
20 TABLET ORAL EVERY EVENING
Qty: 90 TABLET | Refills: 0 | Status: SHIPPED | OUTPATIENT
Start: 2024-05-30

## 2024-05-30 NOTE — TELEPHONE ENCOUNTER
----- Message from James Pruitt MD sent at 5/30/2024  9:08 AM EDT -----  Pravachol 20 mg nightly    ----- Message -----  From: Keturah Walsh  Sent: 5/30/2024   8:33 AM EDT  To: James Pruitt MD    Spouse states pt stopped taking crestor as it caused leg cramps. Is there anything else that can be sent instead?    [facility]

## 2024-07-29 ENCOUNTER — PROCEDURE VISIT (OUTPATIENT)
Dept: AUDIOLOGY | Age: 69
End: 2024-07-29
Payer: MEDICARE

## 2024-07-29 DIAGNOSIS — H90.3 SENSORINEURAL HEARING LOSS, BILATERAL: Primary | ICD-10-CM

## 2024-07-29 PROCEDURE — 92593 PR HEARING AID CHECK BINAURAL: CPT | Performed by: AUDIOLOGIST

## 2024-08-16 NOTE — TELEPHONE ENCOUNTER
----- Message from Ana Maciel MD sent at 5/20/2021  3:25 PM EDT -----  Contact: Tre Barbosa [phone]  Get glipizide 2.5 mg xl daily then  ----- Message -----  From: Suzi Sawant  Sent: 5/20/2021   1:38 PM EDT  To: Ana Maciel MD      ----- Message -----  From: Dennis Miller MA  Sent: 5/20/2021   1:32 PM EDT  To: Suzi Sawant    Patient's wife Fahad Andujar called and said she is uncertain how to break up her husbands \"sugar pills\" she said it is the 5 mg pill  (glipiZIDE (GLUCOTROL) 5 MG tablet) . Patients pharmacy is [facility].  Thanks NIBP STAT measurement started.

## 2024-09-13 RX ORDER — PRAVASTATIN SODIUM 20 MG
20 TABLET ORAL NIGHTLY
Qty: 90 TABLET | Refills: 0 | Status: SHIPPED | OUTPATIENT
Start: 2024-09-13

## 2024-10-09 RX ORDER — LANSOPRAZOLE 30 MG/1
30 CAPSULE, DELAYED RELEASE ORAL DAILY
Qty: 90 CAPSULE | Refills: 0 | Status: SHIPPED | OUTPATIENT
Start: 2024-10-09

## 2024-10-16 DIAGNOSIS — F41.9 ANXIETY: ICD-10-CM

## 2024-10-16 RX ORDER — ALPRAZOLAM 1 MG
TABLET ORAL
Qty: 15 TABLET | Refills: 0 | Status: SHIPPED | OUTPATIENT
Start: 2024-10-16 | End: 2024-11-15

## 2024-10-16 RX ORDER — GLIPIZIDE 2.5 MG/1
2.5 TABLET, EXTENDED RELEASE ORAL 2 TIMES DAILY
Qty: 180 TABLET | Refills: 0 | Status: SHIPPED | OUTPATIENT
Start: 2024-10-16

## 2024-10-29 ENCOUNTER — TELEPHONE (OUTPATIENT)
Dept: INTERNAL MEDICINE CLINIC | Age: 69
End: 2024-10-29

## 2024-10-29 RX ORDER — AZITHROMYCIN 250 MG/1
250 TABLET, FILM COATED ORAL SEE ADMIN INSTRUCTIONS
Qty: 6 TABLET | Refills: 0 | Status: SHIPPED | OUTPATIENT
Start: 2024-10-29 | End: 2024-11-03

## 2024-10-29 NOTE — TELEPHONE ENCOUNTER
----- Message from Dr. James Pruitt MD sent at 10/29/2024  3:32 PM EDT -----  Contact: Ena [phone]  Start on z pack if no allergies  Also try mucinex   See us if not better  ----- Message -----  From: Amber Couch  Sent: 10/29/2024   2:46 PM EDT  To: James Pruitt MD    Caller pt's wife, states for 4 days patient has had symptoms that started with a sore throat, cough and head congestion. Pt has had no fever and is Covid negative. Pt has been taking tylenol for cold otc with no relief. Please advise         [facility] [address] [place] [address] [address] - P [phone] - F [phone] (Ph: [phone])

## 2024-11-04 NOTE — H&P
History and Physical            HISTORY OF PRESENT ILLNESS:      The patient is a 73 year old male with history of chronic low back pain for several years, history of dementia, who underwent laminectomy in February of this year, kyphoplasty in May of this year after falls, and the wife states that he had 3 falls after that event, at this time with pain over the low back area, nonradiating, no numbness, no tingling sensation, no sphincter compromises, Valsalva maneuvers do not elicit his pain.  His visual analog pain score is up to 8/10.  The pain is aggravated by sitting, standing, walking, alleviated by rest, he also refers severe decreased range of motion of the right shoulder with severe pain and minimal movement.  He usually ambulates with the walker.           Imaging:  EXAM: MRI LUMBAR SPINE WO CONTRAST     CLINICAL INDICATION: Back pain     COMPARISON:  None.     TECHNIQUE: The study was acquired using the following pulse sequences:  Sagittal T1, sagittal T2,  sagittal inversion recovery, axial T1, axial T2.     FINDINGS: Lordotic curvature of the lumbar spine is preserved. There is a  chronic appearing compression fracture involving the T11 vertebra with  approximately 65% compression deformity. A chronic L1 anterior compression  fracture containing kyphoplasty cement is noted with approximate 75%  compression deformity. A chronic appearing L2 compression fracture with  approximately 65% compression deformity is also noted. Remaining vertebral  body heights, alignment, and bone marrow signal intensity appear normal.  The central spinal canal is adequately patent without evidence of mass. The  conus medullaris is normal in position, caliber and signal intensity. The  roots of the cauda equina appear normal.     At L1/2 intervertebral disc level there is moderate generalized disc  bulging causing mild canal narrowing as well as moderate right and mild  left neural foraminal stenosis.     At L2/3 intervertebral  Hospital Medicine History & Physical      PCP: Kimberly Leach MD    Date of Admission: 5/21/2021    Date of Service: Pt seen/examined on 5/21/2021     Chief Complaint:    Chief Complaint   Patient presents with    Emesis       History Of Present Illness: The patient is a 77 y.o. male with hypertension, hyperlipidemia, GERD, borderline DM, BPH, and anxiety who presents to [facility] with dizziness, nausea, vomiting. He woke up around 0300 this morning. Reports he was dizzy. He thought his sugar was low. He ate a doughnut stick and is improved a little bit. Any movement makes dizziness worse. He feels improved slightly. He had nausea/vomiting. His wife reports he has inner ear damage and that the patient had complained of tinnitus in his ear. BP elevated. Labs stable. CT head negative. CT abdomen/pelvis with cholelithiasis, no obvious cholecystitis, diverticulosis and normal caliber appendix. Admitted to med-surg. Past Medical History:        Diagnosis Date    Anxiety     Bursitis of elbow     right    Coronary artery spasm (HCC)     Diabetes mellitus ([address]     borderline    GERD (gastroesophageal reflux disease)     Hyperlipidemia     Hypertension     Hypertrophy of prostate without urinary obstruction and other lower urinary tract symptoms (LUTS)     Pneumonia        Past Surgical History:        Procedure Laterality Date    COLONOSCOPY  2006    hemorroids    COLONOSCOPY N/A 7/9/2019    COLONOSCOPY WITH BIOPSY performed by Miguel Baptiste DO at [facility] N/A 7/9/2019    COLONOSCOPY POLYPECTOMY SNARE/COLD BIOPSY performed by Miguel Baptiste DO at [facility], [place], operated by [facility], COLON, DIAGNOSTIC      THROAT SURGERY  1970's    had tumors    UPPER GASTROINTESTINAL ENDOSCOPY  11/12/2015       Medications Prior to Admission:    Prior to Admission medications    Medication Sig Start Date End Date Taking?  Authorizing Provider   Omega-3 Fatty Acids (FISH OIL PO) Take 2 tablets by mouth daily   Yes Historical Provider, MD   glipiZIDE (GLUCOTROL XL) 2.5 MG extended release tablet Take 1 tablet by mouth daily 5/20/21   Chelle Shetty MD   Blood Glucose Monitoring Suppl (ACCU-CHEK GUIDE ME) w/Device KIT Use to test blood sugars once every morning. DX: E11.9 2/26/21   Chelle Shetty MD   blood glucose test strips (ACCU-CHEK GUIDE) strip Use to test blood sugars once every morning. DX: E11.9 2/26/21   Chelle Shetty MD   Lancets MISC Use to test blood sugars once every morning. DX: E11.9 2/26/21   Chelle Shetty MD   losartan-hydroCHLOROthiazide [facility]) 100-12.5 MG per tablet Take 1 tablet by mouth once daily 2/25/21   Chelle Shetty MD   lansoprazole (PREVACID) 30 MG delayed release capsule Take 1 capsule by mouth daily 2/25/21   Chelle Shetty MD   metoprolol succinate (TOPROL XL) 100 MG extended release tablet Take 1 tablet by mouth once daily 2/25/21   Chelle Shetty MD   escitalopram (LEXAPRO) 10 MG tablet Take 1 tablet by mouth daily 2/25/21   Chelle Shetty MD   ALPRAZolam [facility]) 1 MG tablet TAKE 1/2 (ONE-HALF) TABLET BY MOUTH NIGHTLY AS NEEDED FOR SLEEP/ANXIETY 2/25/21 6/24/21  Chelle Shetty MD   aspirin 81 MG tablet Take 1 tablet by mouth daily 12/18/19   Chelle Shetty MD       Allergies:  Metformin and related and Statins    Social History:     TOBACCO:   reports that he has never smoked. He has quit using smokeless tobacco.  ETOH:   reports no history of alcohol use.       Family History:   Positive as follows:        Problem Relation Age of Onset    Diabetes Mother     Diabetes Father     Cancer Brother         pancreatic       REVIEW OF SYSTEMS:       Constitutional: Negative for fever   HENT: Negative for sore throat, congestion, ear pain  Respiratory: Negative  for dyspnea, cough   Cardiovascular: Negative for chest pain   Gastrointestinal: Negative for diarrhea + disc level there is moderate generalized bulging  with posterior facet and ligamentum of flavum hypertrophy causing marked  spinal canal stenosis. Marked bilateral neural foraminal stenosis is also  noted.     At L3/4 intervertebral disc level there is moderate generalized disc  bulging with posterior facet and ligamentum flavum hypertrophy causing  moderate spinal canal stenosis. Marked bilateral neural foraminal stenosis  is also present.     At L4/5 intervertebral disc level there is no abnormality.     At L5/S1 intervertebral disc level there is no abnormality.     The paraspinal soft tissues appear unremarkable.     IMPRESSION:        1. Chronic T11, L1, and L2 compression fractures as detailed above.  2. L1-L2 mild spinal canal narrowing as well as moderate right and mild  left neural foraminal stenosis.  3. L2-L3 marked spinal canal as well as bilateral neural foraminal  stenosis.  4. L3-L4 moderate spinal canal stenosis with marked bilateral neural  foraminal stenosis.        Electronically Signed by: CHEY SOTELO MD   Signed on: 10/29/2024 10:34 AM   Workstation ID: [id number]    Narrative & Impression   EXAMINATION: Lumbar spine 3 views on 10/28/2024 1:35 PM      CLINICAL INDICATION: 73-year-old man with low back pain     COMPARISON: none     FINDINGS: A 3-view study of the lumbar spine was performed.  Laminectomy defects are noted at L2 and L3.  There is a kyphoplasty cement in the severely compressed L1 vertebra.  There is severe superior central endplate depression at L2.   There is prominent disc space narrowing at L3-4.  There is no spondylolisthesis.   The sacroiliac joints are normal.  The aorta and iliac arteries are heavily calcified without aneurysm.  Bilateral total hip arthroplasty     IMPRESSION:  Kyphoplasty cement is confined to the severely compressed L1 vertebra.  There is severe superior central endplate depression at L2.  The L3, L4, and L5 vertebral bodies are normal in  height.  There is severe disc space narrowing at L3-4. The heights of the  intervertebral discs at L4-5 and L5-S1 are well-preserved.  There is severe atherosclerotic calcification of the aorta and iliac  arteries without aneurysm.  Laminectomy has been performed at L2 and L3.  There is bilateral total hip arthroplasty.           Electronically Signed by: CATALINO DUTTA MD   Signed on: 10/29/2024 3:04 PM   Workstation ID: [id number]     Narrative & Impression   EXAM: XR SHOULDER 3 VIEWS RIGHT     CLINICAL INDICATION: Chronic pain     COMPARISON: October 2024     FINDINGS: 3 views right shoulder were obtained.     Spurs are seen in the AC joint. Glenohumeral joint is intact. No fracture  or dislocation is seen.     IMPRESSION:  Degenerative changes, no fracture           Electronically Signed by: RADHA HOOD MD   Signed on: 10/28/2024 3:36 PM   Workstation ID: [id number]        Recent Imaging:  MRI BRAIN W WO CONTRAST  Narrative: DATE OF EXAM: 10/11/2024 3:08 PM     EXAMINATION: MRI BRAIN W WO CONTRAST     HISTORY: Abnormal gait, disturbance of memory. Prior abnormal MRI.     COMPARISON: Report from outside MRI performed on 12/16/2022.     TECHNIQUE: Noncontrast and contrast-enhanced MRI of the brain was  performed. Type and dose of contrast was recorded in the patient's chart.     FINDINGS:    Multiple scattered small foci of FLAIR hyperintensity are present within  the cerebral white matter. Ventricles demonstrate no hydrocephalus. No  areas of diffusion restriction are present. No extra-axial fluid  collections are present. Postcontrast images demonstrate no abnormal  parenchyma leptomeningeal enhancement.     Generalized atrophy is present with a posterior parietal atrophy score of 2  and mesial temporal atrophy score of 3/4 (right greater than left).     FLAIR hyperintense bone lesion is present at the left occipital temporal  junction, adjacent to the left jugular bulb, with intrinsic  nausea/vomiting  Genitourinary: Negative for hematuria   Musculoskeletal: Negative for arthralgias   Skin: Negative for rash   Neurological: Negative for syncope + dizziness/vertigo  Hematological: Negative for adenopathy   Psychiatric/Behavorial: Negative for anxiety    PHYSICAL EXAM:    BP (!) 143/82   Pulse 62   Temp 98.2 °F (36.8 °C) (Oral)   Resp 14   Ht 6' (1.829 m)   Wt 220 lb (99.8 kg)   SpO2 95%   BMI 29.84 kg/m²     Gen: No distress. Alert. Eyes: PERRL. No sclera icterus. No conjunctival injection. ENT: No discharge. Pharynx clear. Neck:  Trachea midline. Resp: No accessory muscle use. No crackles. No wheezes. No rhonchi. CV: Regular rate. Regular rhythm. No murmur. No rub. No edema. GI: Non-tender. Non-distended. Normal bowel sounds. No hernia. Skin: Warm and dry. No nodule on exposed extremities. No rash on exposed extremities. M/S: No cyanosis. No joint deformity. No clubbing. Neuro: Awake. Grossly nonfocal    Psych: Oriented x 3. No anxiety or agitation. CBC:   Recent Labs     05/21/21  0555   WBC 8.9   HGB 14.9   HCT 43.4   MCV 90.6        BMP:   Recent Labs     05/21/21  0555      K 4.0      CO2 24   BUN 23*   CREATININE 0.8     LIVER PROFILE:   Recent Labs     05/21/21  0555   AST 19   ALT 22   LIPASE 28.0   BILITOT 0.3   ALKPHOS 51     UA:  Recent Labs     05/21/21  0834   COLORU Yellow   PHUR 5.5   CLARITYU Clear   SPECGRAV 1.020   LEUKOCYTESUR Negative   UROBILINOGEN 0.2   BILIRUBINUR Negative   BLOODU Negative   GLUCOSEU Negative       CULTURES  COVID: not detected    EKG:  I have reviewed the EKG with the following interpretation:   NSR    RADIOLOGY  CT ABDOMEN PELVIS W IV CONTRAST Additional Contrast? None   Final Result   Cholelithiasis. No obvious cholecystitis by CT scan      Diverticulosis and normal caliber appendix. CT HEAD WO CONTRAST   Preliminary Result   No evidence of acute intracranial abnormality.                Active Problems:    Intractable nausea and vomiting  Resolved Problems:    * No resolved hospital problems. *        ASSESSMENT/PLAN:  Dizziness,   Nausea, vomiting  Possible BPPV  - CT head negative. Has unilateral nystagmus.   - admit to med-surg.  - IVF's  - Scopolamine patch. - PT/OT  - antiemetics as needed  - Meclizine prn. Hypertension  - BP elevated. Continue Losartan, HCTZ, Toprol-XL. GERD  - on PPI. Anxiety  - Xanax nightly prn  - Continue Lexapro    DM type 2  - monitor glucose.  SSI coverage  - Check HgbA1C    DVT Prophylaxis: Lovenox  Diet: DIET CLEAR LIQUID;  Code Status: Full Code    Skippy Lat FNP-C  5/21/2021 T1  hyperintensity and postcontrast enhancement measuring approximate 1.7 x 1.9  x 1.9 cm (AP/TR/CC).  Impression: Generalized cerebral atrophy with white matter signal alteration most  commonly related to small vessel ischemia. Of note, parietal and mesial  temporal atrophy is present which can be seen with Alzheimer disease.     1.9 cm enhancing bone lesion adjacent to the left jugular bulb. Size  appears mild enlargement than the previous exam by report (1.5 cm maximal  dimension on the previous exam).     Electronically Signed by: VALERIE WHITMAN MD   Signed on: 10/14/2024 11:43 AM   Workstation ID: [id number]        Past Medical History:   History - past medical        Past Medical History:   Diagnosis Date    A-fib  (CMD)      Prado's esophagus      Dementia (CMD)      Diabetes mellitus  (CMD)      DVT (deep venous thrombosis)  (CMD)      Hyperlipemia      Hyperlipidemia      Hypertension      Sleep apnea       NOT CPAP            Past Surgical History:   History - past surgical         Past Surgical History:   Procedure Laterality Date    Appendectomy        Back surgery         x4    Cabg, vein, four        Coronary artery bypass graft        Joint replacement         b/l hip replacment    Kyphoplasty        Pelvis/hip joint surgery unlisted         hip surgery    Upper gastrointestinal endoscopy                Medications:  Medications          Current Outpatient Medications   Medication Sig Dispense Refill    lisinopril (ZESTRIL) 20 MG tablet Take 1 tablet by mouth in the morning and 1 tablet in the evening.        pioglitazone (ACTOS) 30 MG tablet Take 1 tablet by mouth daily.        sertraline (ZOLOFT) 25 MG tablet Take 25 mg by mouth daily.        acetaminophen (TYLENOL) 500 MG tablet Take 500 mg by mouth every 6 hours as needed for Pain.        metoPROLOL succinate (TOPROL-XL) 50 MG 24 hr tablet Take 50 mg by mouth daily.        Omega-3 Fatty Acids (OMEGA-3 CF PO) Take 1 tablet by mouth daily.         prazosin (MINIPRESS) 1 MG capsule Take 1 capsule by mouth nightly. 30 capsule 1    atorvastatin (LIPITOR) 80 MG tablet Take 1 tablet by mouth every evening. 90 tablet 0    furosemide (LASIX) 40 MG tablet Take 1 tablet by mouth every morning. 90 tablet 0    Calcium Acetate, Phos Binder, (CALCIUM ACETATE PO) Take by mouth every morning.        amLODIPine (NORVASC) 10 MG tablet Take 10 mg by mouth every morning.        apixaBAN (Eliquis) 5 MG Tab Take 5 mg by mouth every 12 hours.        isosorbide mononitrate (IMDUR) 30 MG 24 hr tablet Take 90 mg by mouth every morning.        omeprazole (PrilOSEC) 20 MG capsule Take 20 mg by mouth every morning.        donepezil (ARICEPT) 10 MG tablet Take 10 mg by mouth nightly.        QUEtiapine (SEROquel) 25 MG tablet Take 50 mg by mouth nightly.        glimepiride (AMARYL) 4 MG tablet Take 4 mg by mouth daily (before breakfast).        Multiple Vitamins-Minerals (MENS MULTIVITAMIN PO) Take by mouth every morning.          No current facility-administered medications for this encounter.         Current medications          Current Outpatient Medications   Medication Sig Dispense Refill    lisinopril (ZESTRIL) 20 MG tablet Take 1 tablet by mouth in the morning and 1 tablet in the evening.        pioglitazone (ACTOS) 30 MG tablet Take 1 tablet by mouth daily.        sertraline (ZOLOFT) 25 MG tablet Take 25 mg by mouth daily.        acetaminophen (TYLENOL) 500 MG tablet Take 500 mg by mouth every 6 hours as needed for Pain.        metoPROLOL succinate (TOPROL-XL) 50 MG 24 hr tablet Take 50 mg by mouth daily.        Omega-3 Fatty Acids (OMEGA-3 CF PO) Take 1 tablet by mouth daily.        prazosin (MINIPRESS) 1 MG capsule Take 1 capsule by mouth nightly. 30 capsule 1    atorvastatin (LIPITOR) 80 MG tablet Take 1 tablet by mouth every evening. 90 tablet 0    furosemide (LASIX) 40 MG tablet Take 1 tablet by mouth every morning. 90 tablet 0    Calcium Acetate, Phos Binder, (CALCIUM  ACETATE PO) Take by mouth every morning.        amLODIPine (NORVASC) 10 MG tablet Take 10 mg by mouth every morning.        apixaBAN (Eliquis) 5 MG Tab Take 5 mg by mouth every 12 hours.        isosorbide mononitrate (IMDUR) 30 MG 24 hr tablet Take 90 mg by mouth every morning.        omeprazole (PrilOSEC) 20 MG capsule Take 20 mg by mouth every morning.        donepezil (ARICEPT) 10 MG tablet Take 10 mg by mouth nightly.        QUEtiapine (SEROquel) 25 MG tablet Take 50 mg by mouth nightly.        glimepiride (AMARYL) 4 MG tablet Take 4 mg by mouth daily (before breakfast).        Multiple Vitamins-Minerals (MENS MULTIVITAMIN PO) Take by mouth every morning.          No current facility-administered medications for this encounter.            Allergies:  ALLERGIES:  Hydrocodone, Pcn [penicillins], Penicillins, Tramadol, and Tramadol     Social History:   Social History               Tobacco Use    Smoking status: Never    Smokeless tobacco: Never   Vaping Use    Vaping status: never used   Substance Use Topics    Alcohol use: Not Currently       Comment: rare    Drug use: Never            Family History:   History - family         Family History   Problem Relation Age of Onset    Pneumonia Mother      ALS Father      Coronary Artery Disease Father      Obesity Sister      Heart murmur Brother           had a lung and heart transplant at age 35, passed in 50's    Hyperlipidemia Daughter      Obesity Daughter      Coronary Artery Disease Son              REVIEW OF SYSTEMS:  Review of Systems   Constitutional:  Negative for chills, diaphoresis and fever.   HENT: Negative.     Eyes: Negative.    Respiratory:  Negative for chest tightness and shortness of breath.    Cardiovascular:  Negative for chest pain.   Gastrointestinal:  Negative for abdominal pain, nausea and vomiting.   Endocrine: Negative.    Genitourinary: Negative.    Musculoskeletal:  Positive for back pain and gait problem.   Skin: Negative.     Allergic/Immunologic: Negative.    Hematological: Negative.    Psychiatric/Behavioral: Negative.           PHYSICAL EXAM:  This is an alert and  Overweight patient in no acute distress at the moment of exam.     Vitals:    Blood pressure 116/53, pulse 78, temperature 97.8 °F (36.6 °C), temperature source Temporal, resp. rate 18, SpO2 97%.  There is no height or weight on file to calculate BMI.       HEENT: No scleral icterus, no nystagmus.   Cardiovascular: Normal peripheral perfusion.   Pulmonary/Chest: Non labored respirations, symmetrical chest expansion.   Skin: No rash noted.   Abdomen: Soft, no rebound tenderness.  Ext: No edema or erythema.     Neurologic: Cranial nerves II through XII are grossly intact.  Deep tendon reflexes are normal 2/4, strength of upper extremities is normal 5/5, strength of the lower extremities is normal at 5/5.  No signs of allodynia or trophic changes.     Musculoskeletal:     The lumbar spine has  diminished range with pain.  Straight leg raising test is negative bilateral.  There is no pain at palpation of the lumbar spinous processes.  There is moderate pain on palpation of the bilateral lumbar facet joints.  There is no pain at palpation of the bilateral paraspinal muscles.  Ricki maneuver is negative bilateral.  There is no pain at loading of  bilateral sacroiliac joints. Gaenslen's maneuver is negative bilateral.        Shoulders: There is severe pain at  abduction right .        IMPRESSION/RECOMMENDATIONS:   The patient is a 73 year old male with history of chronic low back pain for several years, history of dementia, who underwent laminectomy in February of this year, kyphoplasty in May of this year after falls, and the wife states that he had 3 falls after that event, at this time with pain over the low back area, nonradiating, no numbness, no tingling sensation, no sphincter compromises, Valsalva maneuvers do not elicit his pain.  His visual analog pain score is up to  8/10.  The pain is aggravated by sitting, standing, walking, alleviated by rest, he also refers severe decreased range of motion of the right shoulder with severe pain and minimal movement.  He usually ambulates with the walker.  During the physical exam he has pain on palpation of bilateral lumbar facet joints, very decreased range of motion of the right shoulder.    The patient has history of at least 3 months of moderate to severe pain with functional impairment making every day activities extremely difficult and painful.  Pain is inadequately responsive to conservative care such as NSAIDs, acetaminophen and physical therapy.   The pain is predominantly axial.  There is no nonfacet pathology that could explain the source of the patient's pain such as untreated acute fracture, tumor, infection or significant deformity.  Clinical assessment implicates the facet joint as the putative source of pain.  The patient has severe pain at extension movement of the spine.    I will schedule the patient to have a bilateral lumbar facet joint medial branch diagnostic blocks of L3-4, L4-5, and 5 S1 under fluoroscopic guidance and local and a right shoulder injection of anesthetic and steroids in the pain management clinic.  I went over the procedure with the patient, potential benefits and complications, including bleeding, infection, nerve injury, worsening of the patient pain.  I went over the procedure and the complications in very simple terms.  I answered all the patient questions.  The patient understood, agrees and wants to proceed. The risks and benefits as well as alternative options were explained to the patient in detail.    As it pertains to any injection there is an inherent risks of bleeding and infection.   If the patient experiences paresthesia during the procedure, it is expected to be transient.   If the patient experiences numbness or heaviness of the extremity blocked from the procedure, it is expected to be  temporary, lasting only for the duration of the local anesthetic.  And as with any pain procedure, there is a possibility that the procedure may not completely relieve the pain to the satisfaction of the patient. The patient understands that the procedure is an elective procedure.    The plan is for this patient to receive anti anxiety medication before the procedure  consisting of benzodiazepine .         The patient expresses understanding and wishes to proceed.         He will follow-up in the pain management clinic 2 weeks after procedure.         Thank you very much.        PQRS :  The Patient states  osteoarthritis over knees, hips and lumbar spine.  OA functioning assesed patient able to ambulate  with assisting devices .  Pain assesed and documented in the chart, visual analog pain score is  8 in  0-10 scale  Current medications in then chart.  Radiology exposure is documented in operative reports  Quality of life with primary headache disorder , the patient does not have a primary headache disorder.  Falls plan of care METS >4. Sitting test performed.  Risk assesment for falls completed.  Osteoporosis treatment is being assesed and treated by primary doctor.  Tobacco prevention performed patient is a   non-smoker.  Screening blood pressure is  appropriate for age. The patient will follow with  primary doctor.  BMI overweight (BMI 25 -29.9) .  The patient will follow with  primary doctor.  Advance care plan is family member/POA/friend.    History Obtained From: patient, electronic medical record.    Roverto Jacques MD  INTERVENTIONAL PAIN MANAGEMENT SPECIALIST  11:29 AM  11/04/24

## 2024-12-02 ENCOUNTER — PROCEDURE VISIT (OUTPATIENT)
Dept: AUDIOLOGY | Age: 69
End: 2024-12-02

## 2024-12-02 DIAGNOSIS — H90.3 SENSORINEURAL HEARING LOSS, BILATERAL: Primary | ICD-10-CM

## 2024-12-02 NOTE — PROGRESS NOTES
Rell Avendaño  1955.69 y.o. male   [id number]    HEARING AID DROP OFF    RIGHT EAR: /MODEL: Phonak Audeo P70-R  SN: [id number]  WIRE/DOME: 2(P)/Medium Power    HAF: 8/31/2021  WARRANTY: 11/16/2024     BUTTONS: volume control  PROGRAMS: Auto-Sense  PHONE CONNECTIVITY: N/A    PROCEDURES:     Patient dropped off right hearing aid for broken  wire.  Replaced  and listening check revealed good sound quality.  Called patient's wife Ena to .  Advised he is out of warranty after today's appointment.    PATIENT EDUCATION:     - Verbally and visually reviewed importance of consistent use and care and maintenance of devices.      Information was verbally shared with patient during appointment.        RECOMMENDATIONS:     Continue consistent hearing aid use  Return for hearing aid checks as needed  Retest hearing as medically indicated and/or sooner if a change in hearing is noted.  Contact audiologist with questions/concerns as needed    **No charge for today's appointment    Eunice Flowers  Audiologist

## 2024-12-17 ENCOUNTER — OFFICE VISIT (OUTPATIENT)
Dept: INTERNAL MEDICINE CLINIC | Age: 69
End: 2024-12-17

## 2024-12-17 VITALS
HEIGHT: 72 IN | SYSTOLIC BLOOD PRESSURE: 135 MMHG | HEART RATE: 65 BPM | WEIGHT: 247 LBS | DIASTOLIC BLOOD PRESSURE: 75 MMHG | RESPIRATION RATE: 18 BRPM | BODY MASS INDEX: 33.46 KG/M2

## 2024-12-17 DIAGNOSIS — E11.9 TYPE 2 DIABETES MELLITUS WITHOUT COMPLICATION, WITHOUT LONG-TERM CURRENT USE OF INSULIN (HCC): Primary | ICD-10-CM

## 2024-12-17 DIAGNOSIS — F41.9 ANXIETY: ICD-10-CM

## 2024-12-17 DIAGNOSIS — I10 HYPERTENSION, ESSENTIAL: ICD-10-CM

## 2024-12-17 DIAGNOSIS — K21.9 GASTROESOPHAGEAL REFLUX DISEASE WITHOUT ESOPHAGITIS: ICD-10-CM

## 2024-12-17 DIAGNOSIS — E78.2 MIXED HYPERLIPIDEMIA: ICD-10-CM

## 2024-12-17 DIAGNOSIS — E11.9 TYPE 2 DIABETES MELLITUS WITHOUT COMPLICATION, WITHOUT LONG-TERM CURRENT USE OF INSULIN (HCC): ICD-10-CM

## 2024-12-17 DIAGNOSIS — Z86.79 H/O PRINZMETAL ANGINA: ICD-10-CM

## 2024-12-17 DIAGNOSIS — Z23 NEED FOR INFLUENZA VACCINATION: ICD-10-CM

## 2024-12-17 LAB
ANION GAP SERPL CALCULATED.3IONS-SCNC: 13 MMOL/L (ref 3–16)
BUN SERPL-MCNC: 16 MG/DL (ref 7–20)
CALCIUM SERPL-MCNC: 9.7 MG/DL (ref 8.3–10.6)
CHLORIDE SERPL-SCNC: 101 MMOL/L (ref 99–110)
CO2 SERPL-SCNC: 26 MMOL/L (ref 21–32)
CREAT SERPL-MCNC: 0.9 MG/DL (ref 0.8–1.3)
GFR SERPLBLD CREATININE-BSD FMLA CKD-EPI: >90 ML/MIN/{1.73_M2}
GLUCOSE SERPL-MCNC: 140 MG/DL (ref 70–99)
POTASSIUM SERPL-SCNC: 4.2 MMOL/L (ref 3.5–5.1)
SODIUM SERPL-SCNC: 140 MMOL/L (ref 136–145)

## 2024-12-17 PROCEDURE — 2022F DILAT RTA XM EVC RTNOPTHY: CPT | Performed by: INTERNAL MEDICINE

## 2024-12-17 PROCEDURE — 1160F RVW MEDS BY RX/DR IN RCRD: CPT | Performed by: INTERNAL MEDICINE

## 2024-12-17 PROCEDURE — 1036F TOBACCO NON-USER: CPT | Performed by: INTERNAL MEDICINE

## 2024-12-17 PROCEDURE — 3075F SYST BP GE 130 - 139MM HG: CPT | Performed by: INTERNAL MEDICINE

## 2024-12-17 PROCEDURE — 99212 OFFICE O/P EST SF 10 MIN: CPT | Performed by: INTERNAL MEDICINE

## 2024-12-17 PROCEDURE — G8427 DOCREV CUR MEDS BY ELIG CLIN: HCPCS | Performed by: INTERNAL MEDICINE

## 2024-12-17 PROCEDURE — 3078F DIAST BP <80 MM HG: CPT | Performed by: INTERNAL MEDICINE

## 2024-12-17 PROCEDURE — 3044F HG A1C LEVEL LT 7.0%: CPT | Performed by: INTERNAL MEDICINE

## 2024-12-17 PROCEDURE — G0008 ADMIN INFLUENZA VIRUS VAC: HCPCS | Performed by: INTERNAL MEDICINE

## 2024-12-17 PROCEDURE — G8417 CALC BMI ABV UP PARAM F/U: HCPCS | Performed by: INTERNAL MEDICINE

## 2024-12-17 PROCEDURE — 3017F COLORECTAL CA SCREEN DOC REV: CPT | Performed by: INTERNAL MEDICINE

## 2024-12-17 PROCEDURE — G8482 FLU IMMUNIZE ORDER/ADMIN: HCPCS | Performed by: INTERNAL MEDICINE

## 2024-12-17 PROCEDURE — 90662 IIV NO PRSV INCREASED AG IM: CPT | Performed by: INTERNAL MEDICINE

## 2024-12-17 PROCEDURE — 1159F MED LIST DOCD IN RCRD: CPT | Performed by: INTERNAL MEDICINE

## 2024-12-17 PROCEDURE — 1123F ACP DISCUSS/DSCN MKR DOCD: CPT | Performed by: INTERNAL MEDICINE

## 2024-12-17 RX ORDER — LOSARTAN POTASSIUM AND HYDROCHLOROTHIAZIDE 12.5; 1 MG/1; MG/1
TABLET ORAL
Qty: 90 TABLET | Refills: 2 | Status: SHIPPED | OUTPATIENT
Start: 2024-12-17

## 2024-12-17 RX ORDER — GLIPIZIDE 2.5 MG/1
2.5 TABLET, EXTENDED RELEASE ORAL 2 TIMES DAILY
Qty: 180 TABLET | Refills: 0 | Status: SHIPPED | OUTPATIENT
Start: 2024-12-17

## 2024-12-17 RX ORDER — METOPROLOL SUCCINATE 100 MG/1
100 TABLET, EXTENDED RELEASE ORAL DAILY
Qty: 90 TABLET | Refills: 2 | Status: SHIPPED | OUTPATIENT
Start: 2024-12-17

## 2024-12-17 RX ORDER — ESCITALOPRAM OXALATE 10 MG/1
10 TABLET ORAL DAILY
Qty: 90 TABLET | Refills: 1 | Status: SHIPPED | OUTPATIENT
Start: 2024-12-17

## 2024-12-17 RX ORDER — LANSOPRAZOLE 30 MG/1
30 CAPSULE, DELAYED RELEASE ORAL DAILY
Qty: 90 CAPSULE | Refills: 2 | Status: SHIPPED | OUTPATIENT
Start: 2024-12-17

## 2024-12-17 RX ORDER — PRAVASTATIN SODIUM 20 MG
20 TABLET ORAL NIGHTLY
Qty: 90 TABLET | Refills: 2 | Status: SHIPPED | OUTPATIENT
Start: 2024-12-17 | End: 2024-12-19 | Stop reason: DRUGHIGH

## 2024-12-17 RX ORDER — ALPRAZOLAM 1 MG/1
TABLET ORAL
Qty: 15 TABLET | Refills: 2 | Status: SHIPPED | OUTPATIENT
Start: 2024-12-17 | End: 2025-01-16

## 2024-12-17 NOTE — PROGRESS NOTES
Pink , anicteric  Neck: No JVD, no carotid bruit, no thyromegaly  Chest:  Clear to auscultation bilaterally, no added sounds  Cardiovascular:  RRR S1S2 heard, no murmurs or gallops  Abdomen:  Soft, undistended, non tender, no organomegaly, BS present  Extremities: No edema or cyanosis. Distal pulses well felt  Neuro - non focal except for hearing loss   Has hearing aids now     Wt Readings from Last 3 Encounters:   12/17/24 112 kg (247 lb)   05/29/24 110.2 kg (243 lb)   01/08/24 110.2 kg (243 lb)     Hemoglobin A1C   Date Value Ref Range Status   05/29/2024 6.3 See comment % Final     Comment:     Comment:  Diagnosis of Diabetes: > or = 6.5%  Increased risk of diabetes (Prediabetes): 5.7-6.4%  Glycemic Control: Nonpregnant Adults: <7.0%                    Pregnant: <6.0%           Assessment:       Diagnosis Orders   1. Type 2 diabetes mellitus without complication, without long-term current use of insulin (HCC)        2. Need for influenza vaccination  Influenza, FLUZONE HIGH DOSE Trivalent, (age 65 y+), IM, Preservative Free, 0.5mL      3. Mixed hyperlipidemia        4. H/O Prinzmetal angina        5. Gastroesophageal reflux disease without esophagitis        6. Hypertension, essential        7. Anxiety                  Plan:   Assessment & Plan          DM - 2 - did not tolerate metformin  Now doing diet and on glipizide 2.5 mg bid  improved sugars.     Continue diet and exercise  Keep off metformin for now  A1c at 5.8 >6.3  Yearly eye exam - done   Continue ARB.     HTN- stable on current meds- hyzaar,metoprolol  off norvasc     Hyperlipidemia - on fish oil, stopped fenofibrate /statins with myalgias  Continue same meds, last LDL at 121  Recommend to try pravachol     Prinzmetal angina -   Always gets better with xanax 0.25 mg half dose   Resolved now , prn xanax and off  lexapro       Anxiety- on xanax half tab  prn , no abuse noted  Uses as needed only, oarrs being monitored      Dizziness - sec to

## 2024-12-18 LAB
EST. AVERAGE GLUCOSE BLD GHB EST-MCNC: 145.6 MG/DL
HBA1C MFR BLD: 6.7 %

## 2024-12-19 ENCOUNTER — TELEPHONE (OUTPATIENT)
Dept: INTERNAL MEDICINE CLINIC | Age: 69
End: 2024-12-19

## 2024-12-19 RX ORDER — PRAVASTATIN SODIUM 20 MG
20 TABLET ORAL EVERY OTHER DAY
Qty: 90 TABLET | Refills: 2 | Status: SHIPPED | OUTPATIENT
Start: 2024-12-19

## 2024-12-19 NOTE — TELEPHONE ENCOUNTER
----- Message from Dr. James Pruitt MD sent at 12/19/2024  9:24 AM EST -----  Contact: VEGA [phone]  Every other day due to myalgias  ----- Message -----  From: Areli Do  Sent: 12/18/2024   3:55 PM EST  To: MD Vega Oshea at [facility] is wanting to confirm the directions for the below medication as the patient had stated he is to be taking the medication every other day, not daily. Please advise     pravastatin (PRAVACHOL) 20 MG tablet    [place] [address] -  [phone] - F [phone]  [address] [place] [id number]  Phone: [phone]  Fax: [phone]

## 2025-01-02 ENCOUNTER — TELEPHONE (OUTPATIENT)
Dept: INTERNAL MEDICINE CLINIC | Age: 70
End: 2025-01-02

## 2025-01-02 RX ORDER — AZITHROMYCIN 250 MG/1
TABLET, FILM COATED ORAL
Qty: 6 TABLET | Refills: 0 | Status: SHIPPED | OUTPATIENT
Start: 2025-01-02

## 2025-01-02 NOTE — TELEPHONE ENCOUNTER
----- Message from Dr. Eric Del Rio MD sent at 1/2/2025 10:00 AM EST -----  Contact: Ena [phone]  alka  ----- Message -----  From: Minna Mallory  Sent: 1/2/2025   9:44 AM EST  To: Eric Del Rio MD    Spouse requesting medication for head/chest congestion, low grade fever, cough, mucus yellowish, and COVID negative.  Patient has tried Tylenol cold/flu, which isn't helping.  Please advise        [facility] [address] [place] [address] [address] - P [phone] - F [phone]

## 2025-01-21 ENCOUNTER — OFFICE VISIT (OUTPATIENT)
Dept: INTERNAL MEDICINE CLINIC | Age: 70
End: 2025-01-21

## 2025-01-21 VITALS
HEART RATE: 65 BPM | SYSTOLIC BLOOD PRESSURE: 130 MMHG | BODY MASS INDEX: 34.13 KG/M2 | WEIGHT: 252 LBS | RESPIRATION RATE: 18 BRPM | DIASTOLIC BLOOD PRESSURE: 75 MMHG | HEIGHT: 72 IN

## 2025-01-21 DIAGNOSIS — E11.9 TYPE 2 DIABETES MELLITUS WITHOUT COMPLICATION, WITHOUT LONG-TERM CURRENT USE OF INSULIN (HCC): ICD-10-CM

## 2025-01-21 DIAGNOSIS — J20.9 ACUTE BRONCHITIS, UNSPECIFIED ORGANISM: Primary | ICD-10-CM

## 2025-01-21 PROCEDURE — G8417 CALC BMI ABV UP PARAM F/U: HCPCS | Performed by: INTERNAL MEDICINE

## 2025-01-21 PROCEDURE — 3046F HEMOGLOBIN A1C LEVEL >9.0%: CPT | Performed by: INTERNAL MEDICINE

## 2025-01-21 PROCEDURE — 1036F TOBACCO NON-USER: CPT | Performed by: INTERNAL MEDICINE

## 2025-01-21 PROCEDURE — G8427 DOCREV CUR MEDS BY ELIG CLIN: HCPCS | Performed by: INTERNAL MEDICINE

## 2025-01-21 PROCEDURE — 3017F COLORECTAL CA SCREEN DOC REV: CPT | Performed by: INTERNAL MEDICINE

## 2025-01-21 PROCEDURE — 1159F MED LIST DOCD IN RCRD: CPT | Performed by: INTERNAL MEDICINE

## 2025-01-21 PROCEDURE — 3075F SYST BP GE 130 - 139MM HG: CPT | Performed by: INTERNAL MEDICINE

## 2025-01-21 PROCEDURE — 3078F DIAST BP <80 MM HG: CPT | Performed by: INTERNAL MEDICINE

## 2025-01-21 PROCEDURE — 2022F DILAT RTA XM EVC RTNOPTHY: CPT | Performed by: INTERNAL MEDICINE

## 2025-01-21 PROCEDURE — 99212 OFFICE O/P EST SF 10 MIN: CPT | Performed by: INTERNAL MEDICINE

## 2025-01-21 PROCEDURE — 1160F RVW MEDS BY RX/DR IN RCRD: CPT | Performed by: INTERNAL MEDICINE

## 2025-01-21 PROCEDURE — 1123F ACP DISCUSS/DSCN MKR DOCD: CPT | Performed by: INTERNAL MEDICINE

## 2025-01-21 RX ORDER — ALBUTEROL SULFATE 90 UG/1
2 INHALANT RESPIRATORY (INHALATION) 4 TIMES DAILY PRN
Qty: 54 G | Refills: 1 | Status: SHIPPED | OUTPATIENT
Start: 2025-01-21 | End: 2025-01-21 | Stop reason: SDUPTHER

## 2025-01-21 RX ORDER — DOXYCYCLINE HYCLATE 100 MG
100 TABLET ORAL 2 TIMES DAILY
Qty: 10 TABLET | Refills: 0 | Status: SHIPPED | OUTPATIENT
Start: 2025-01-21 | End: 2025-01-21 | Stop reason: SDUPTHER

## 2025-01-21 RX ORDER — ALBUTEROL SULFATE 90 UG/1
2 INHALANT RESPIRATORY (INHALATION) 4 TIMES DAILY PRN
Qty: 54 G | Refills: 1 | Status: SHIPPED | OUTPATIENT
Start: 2025-01-21

## 2025-01-21 RX ORDER — DOXYCYCLINE HYCLATE 100 MG
100 TABLET ORAL 2 TIMES DAILY
Qty: 10 TABLET | Refills: 0 | Status: SHIPPED | OUTPATIENT
Start: 2025-01-21 | End: 2025-01-26

## 2025-01-21 NOTE — PROGRESS NOTES
Subjective:      Patient ID: Rell Avendaño is a 69 y.o. male.    HPI    69 y.o.. Male with known h.o HTN, Hyperlipidemia, anxiety here for ongoing cough x 2 weeks       Started with URI symptoms 2 weeks ago with cough , sorethroat , sob and thick white sputum. Did z pack with some relief but now that he completed abx, symptoms of cough and yellow sputum started again   No fevers or chills  Some wheezing per wife  Sick contacts at home noted  No change in bladder symptoms            Allergies   Allergen Reactions    Metformin And Related     Statins      Muscle cramps  Muscle cramps       Current Outpatient Medications   Medication Sig Dispense Refill    doxycycline hyclate (VIBRA-TABS) 100 MG tablet Take 1 tablet by mouth 2 times daily for 5 days 10 tablet 0    albuterol sulfate HFA (VENTOLIN HFA) 108 (90 Base) MCG/ACT inhaler Inhale 2 puffs into the lungs 4 times daily as needed for Wheezing 54 g 1    pravastatin (PRAVACHOL) 20 MG tablet Take 1 tablet by mouth every other day 90 tablet 2    ALPRAZolam (XANAX) 1 MG tablet TAKE 1/2 (ONE-HALF) TABLET BY MOUTH NIGHTLY AS NEEDED FOR SLEEP 15 tablet 2    escitalopram (LEXAPRO) 10 MG tablet Take 1 tablet by mouth daily 90 tablet 1    glipiZIDE (GLUCOTROL XL) 2.5 MG extended release tablet Take 1 tablet by mouth 2 times daily 180 tablet 0    lansoprazole (PREVACID) 30 MG delayed release capsule Take 1 capsule by mouth daily 90 capsule 2    losartan-hydroCHLOROthiazide (HYZAAR) 100-12.5 MG per tablet Take 1 tablet by mouth once daily 90 tablet 2    metoprolol succinate (TOPROL XL) 100 MG extended release tablet Take 1 tablet by mouth daily 90 tablet 2    meclizine (ANTIVERT) 25 MG tablet Take 1 tablet by mouth 3 times daily as needed for Dizziness 30 tablet 0    blood glucose test strips (ACCU-CHEK GUIDE) strip USE 1 STRIP TO CHECK SUGAR  IN THE MORNING 100 each 0    Blood Glucose Monitoring Suppl (ACCU-CHEK GUIDE ME) w/Device KIT Use to test blood sugars once every

## 2025-04-04 ENCOUNTER — OFFICE VISIT (OUTPATIENT)
Dept: INTERNAL MEDICINE CLINIC | Age: 70
End: 2025-04-04

## 2025-04-04 ENCOUNTER — HOSPITAL ENCOUNTER (OUTPATIENT)
Age: 70
Discharge: HOME | End: 2025-04-04
Payer: MEDICARE

## 2025-04-04 ENCOUNTER — HOSPITAL ENCOUNTER (OUTPATIENT)
Dept: GENERAL RADIOLOGY | Age: 70
Discharge: HOME | End: 2025-04-04
Payer: MEDICARE

## 2025-04-04 VITALS
WEIGHT: 246 LBS | HEIGHT: 72 IN | SYSTOLIC BLOOD PRESSURE: 126 MMHG | RESPIRATION RATE: 18 BRPM | DIASTOLIC BLOOD PRESSURE: 75 MMHG | HEART RATE: 65 BPM | BODY MASS INDEX: 33.32 KG/M2

## 2025-04-04 DIAGNOSIS — M54.50 ACUTE MIDLINE LOW BACK PAIN WITHOUT SCIATICA: ICD-10-CM

## 2025-04-04 DIAGNOSIS — M54.9 ACUTE UPPER BACK PAIN: Primary | ICD-10-CM

## 2025-04-04 DIAGNOSIS — M54.9 ACUTE UPPER BACK PAIN: ICD-10-CM

## 2025-04-04 PROCEDURE — 1123F ACP DISCUSS/DSCN MKR DOCD: CPT | Performed by: INTERNAL MEDICINE

## 2025-04-04 PROCEDURE — G8428 CUR MEDS NOT DOCUMENT: HCPCS | Performed by: INTERNAL MEDICINE

## 2025-04-04 PROCEDURE — 1036F TOBACCO NON-USER: CPT | Performed by: INTERNAL MEDICINE

## 2025-04-04 PROCEDURE — 3074F SYST BP LT 130 MM HG: CPT | Performed by: INTERNAL MEDICINE

## 2025-04-04 PROCEDURE — 99212 OFFICE O/P EST SF 10 MIN: CPT | Performed by: INTERNAL MEDICINE

## 2025-04-04 PROCEDURE — G8417 CALC BMI ABV UP PARAM F/U: HCPCS | Performed by: INTERNAL MEDICINE

## 2025-04-04 PROCEDURE — 72100 X-RAY EXAM L-S SPINE 2/3 VWS: CPT

## 2025-04-04 PROCEDURE — 72070 X-RAY EXAM THORAC SPINE 2VWS: CPT

## 2025-04-04 PROCEDURE — 3078F DIAST BP <80 MM HG: CPT | Performed by: INTERNAL MEDICINE

## 2025-04-04 PROCEDURE — 3017F COLORECTAL CA SCREEN DOC REV: CPT | Performed by: INTERNAL MEDICINE

## 2025-04-04 RX ORDER — GLIPIZIDE 2.5 MG/1
2.5 TABLET, EXTENDED RELEASE ORAL 2 TIMES DAILY
Qty: 180 TABLET | Refills: 0 | Status: SHIPPED | OUTPATIENT
Start: 2025-04-04

## 2025-04-04 NOTE — PROGRESS NOTES
Subjective:      Patient ID: Rell Avendaño is a 69 y.o. male.    HPI    69 y.o.. Male with known h.o HTN, Hyperlipidemia, anxiety here for ongoing low and upper back pain x 1 month      Has been working in the yard and pulled his side and pain later in mid and lower back, improved with rest.Radiation to LE x 1 week but improved later  No tingling or numbness in feet. Gait steady now   Later went to chiropractor and did some therapy   Pain almost gone but comes and goes. Wishes to rule out any fractures          Allergies   Allergen Reactions    Metformin And Related     Statins      Muscle cramps  Muscle cramps       Current Outpatient Medications   Medication Sig Dispense Refill    glipiZIDE (GLUCOTROL XL) 2.5 MG extended release tablet Take 1 tablet by mouth twice daily 180 tablet 0    albuterol sulfate HFA (VENTOLIN HFA) 108 (90 Base) MCG/ACT inhaler Inhale 2 puffs into the lungs 4 times daily as needed for Wheezing 54 g 1    pravastatin (PRAVACHOL) 20 MG tablet Take 1 tablet by mouth every other day 90 tablet 2    ALPRAZolam (XANAX) 1 MG tablet TAKE 1/2 (ONE-HALF) TABLET BY MOUTH NIGHTLY AS NEEDED FOR SLEEP 15 tablet 2    escitalopram (LEXAPRO) 10 MG tablet Take 1 tablet by mouth daily 90 tablet 1    lansoprazole (PREVACID) 30 MG delayed release capsule Take 1 capsule by mouth daily 90 capsule 2    losartan-hydroCHLOROthiazide (HYZAAR) 100-12.5 MG per tablet Take 1 tablet by mouth once daily 90 tablet 2    metoprolol succinate (TOPROL XL) 100 MG extended release tablet Take 1 tablet by mouth daily 90 tablet 2    meclizine (ANTIVERT) 25 MG tablet Take 1 tablet by mouth 3 times daily as needed for Dizziness 30 tablet 0    blood glucose test strips (ACCU-CHEK GUIDE) strip USE 1 STRIP TO CHECK SUGAR  IN THE MORNING 100 each 0    Blood Glucose Monitoring Suppl (ACCU-CHEK GUIDE ME) w/Device KIT Use to test blood sugars once every morning. DX: E11.9 1 kit 0    Lancets MISC Use to test blood sugars once every

## 2025-04-08 ENCOUNTER — RESULTS FOLLOW-UP (OUTPATIENT)
Dept: INTERNAL MEDICINE CLINIC | Age: 70
End: 2025-04-08

## 2025-06-03 ENCOUNTER — TELEPHONE (OUTPATIENT)
Dept: AUDIOLOGY | Age: 70
End: 2025-06-03

## 2025-06-03 ENCOUNTER — PROCEDURE VISIT (OUTPATIENT)
Dept: AUDIOLOGY | Age: 70
End: 2025-06-03

## 2025-06-03 ENCOUNTER — TELEPHONE (OUTPATIENT)
Dept: ENT CLINIC | Age: 70
End: 2025-06-03

## 2025-06-03 DIAGNOSIS — H90.3 SENSORINEURAL HEARING LOSS, BILATERAL: Primary | ICD-10-CM

## 2025-06-03 NOTE — TELEPHONE ENCOUNTER
Pt wife (Ena) called in for pt advising to proceed with HA repair send off. Would like to know if a loaner HA is available?  Advised pt wife (Ena) that loaner SCHAFER would be ready for P/U at [facility] by the end of the day today. Pt SCHAFER being sent off for repair.

## 2025-06-03 NOTE — PROGRESS NOTES
Rell Avendaño  1955.69 y.o. male   [id number]    HEARING AID CHECK     RIGHT EAR: /MODEL: Phonak Audeo P70-R  SN: [id number]  WIRE/DOME: 2(P)/Medium Power    HAF: 8/31/2021  WARRANTY: 11/16/2024     BUTTONS: volume control  PROGRAMS: Auto-Sense  PHONE CONNECTIVITY: N/A    PROCEDURES:     Patient dropped off right hearing aid due to poor battery life.  His wife approved sending the hearing aid out for repair and was quoted $290.00 with a 12 month repair warranty.  Programmed loaner hearing aid and left at the .  Will call patient to  once back from repair.    RECOMMENDATIONS:     Continue consistent hearing aid use  Return for hearing aid checks as needed  Retest hearing as medically indicated and/or sooner if a change in hearing is noted.  Contact audiologist with questions/concerns as needed    **No charge for today's appointment    Eunice Flowers  Audiologist

## 2025-06-03 NOTE — TELEPHONE ENCOUNTER
Patient dropped off right hearing aid due to poor battery life.  The drop off note stated to call if repair is greater than $100.00.  Called and explained to patient's wife he is out of warranty.  The cost of the repair is $290.00 with a one year repair warranty.  Ena will talk to  and call back whether to send out.

## 2025-06-12 ENCOUNTER — TELEPHONE (OUTPATIENT)
Dept: AUDIOLOGY | Age: 70
End: 2025-06-12

## 2025-06-12 ENCOUNTER — PROCEDURE VISIT (OUTPATIENT)
Dept: AUDIOLOGY | Age: 70
End: 2025-06-12

## 2025-06-12 DIAGNOSIS — H90.3 SENSORINEURAL HEARING LOSS, BILATERAL: Primary | ICD-10-CM

## 2025-06-12 PROCEDURE — V5014 HEARING AID REPAIR/MODIFYING: HCPCS | Performed by: AUDIOLOGIST

## 2025-06-12 NOTE — TELEPHONE ENCOUNTER
RIGHT EAR: /MODEL: Phonak Magdaeo P70-R  SN: [id number]  WIRE/DOME: 2(P)/Medium Power    Waxguard: [employer]  HAF: 8/31/2021  WARRANTY: 11/16/2024- new 6/10/2026     BUTTONS: volume control  PROGRAMS: Auto-Sense  PHONE CONNECTIVITY: N/A    Received right hearing aid from repair.  Called patient's wife to inform her hearing aid can be picked up at the [place] office.  Will owe $290.00 for repair with a one year warranty.

## 2025-06-24 ENCOUNTER — OFFICE VISIT (OUTPATIENT)
Dept: INTERNAL MEDICINE CLINIC | Age: 70
End: 2025-06-24

## 2025-06-24 VITALS
HEIGHT: 72 IN | DIASTOLIC BLOOD PRESSURE: 78 MMHG | BODY MASS INDEX: 33.32 KG/M2 | WEIGHT: 246 LBS | HEART RATE: 62 BPM | SYSTOLIC BLOOD PRESSURE: 144 MMHG | RESPIRATION RATE: 18 BRPM

## 2025-06-24 DIAGNOSIS — I10 HYPERTENSION, ESSENTIAL: ICD-10-CM

## 2025-06-24 DIAGNOSIS — E78.2 MIXED HYPERLIPIDEMIA: ICD-10-CM

## 2025-06-24 DIAGNOSIS — K21.9 GASTROESOPHAGEAL REFLUX DISEASE WITHOUT ESOPHAGITIS: ICD-10-CM

## 2025-06-24 DIAGNOSIS — E11.9 TYPE 2 DIABETES MELLITUS WITHOUT COMPLICATION, WITHOUT LONG-TERM CURRENT USE OF INSULIN (HCC): ICD-10-CM

## 2025-06-24 DIAGNOSIS — F41.9 ANXIETY: ICD-10-CM

## 2025-06-24 DIAGNOSIS — Z00.00 MEDICARE ANNUAL WELLNESS VISIT, SUBSEQUENT: ICD-10-CM

## 2025-06-24 DIAGNOSIS — Z71.89 ACP (ADVANCE CARE PLANNING): ICD-10-CM

## 2025-06-24 DIAGNOSIS — Z00.00 MEDICARE ANNUAL WELLNESS VISIT, SUBSEQUENT: Primary | ICD-10-CM

## 2025-06-24 DIAGNOSIS — Z86.79 H/O PRINZMETAL ANGINA: ICD-10-CM

## 2025-06-24 LAB
ALBUMIN SERPL-MCNC: 4.6 G/DL (ref 3.4–5)
ALBUMIN/GLOB SERPL: 2.3 {RATIO} (ref 1.1–2.2)
ALP SERPL-CCNC: 54 U/L (ref 40–129)
ALT SERPL-CCNC: 34 U/L (ref 10–40)
ANION GAP SERPL CALCULATED.3IONS-SCNC: 15 MMOL/L (ref 3–16)
AST SERPL-CCNC: 22 U/L (ref 15–37)
BASOPHILS # BLD: 0.1 K/UL (ref 0–0.2)
BASOPHILS NFR BLD: 1.3 %
BILIRUB SERPL-MCNC: 0.3 MG/DL (ref 0–1)
BILIRUBIN, POC: NORMAL
BLOOD URINE, POC: NORMAL
BUN SERPL-MCNC: 19 MG/DL (ref 7–20)
CALCIUM SERPL-MCNC: 9.6 MG/DL (ref 8.3–10.6)
CHLORIDE SERPL-SCNC: 103 MMOL/L (ref 99–110)
CHOLEST SERPL-MCNC: 230 MG/DL (ref 0–199)
CLARITY, POC: NORMAL
CO2 SERPL-SCNC: 24 MMOL/L (ref 21–32)
COLOR, POC: NORMAL
CREAT SERPL-MCNC: 0.9 MG/DL (ref 0.8–1.3)
DEPRECATED RDW RBC AUTO: 13.3 % (ref 12.4–15.4)
EOSINOPHIL # BLD: 0.3 K/UL (ref 0–0.6)
EOSINOPHIL NFR BLD: 3.2 %
GFR SERPLBLD CREATININE-BSD FMLA CKD-EPI: >90 ML/MIN/{1.73_M2}
GLUCOSE SERPL-MCNC: 137 MG/DL (ref 70–99)
GLUCOSE URINE, POC: NORMAL MG/DL
HCT VFR BLD AUTO: 47.2 % (ref 40.5–52.5)
HDLC SERPL-MCNC: 37 MG/DL (ref 40–60)
HGB BLD-MCNC: 15.9 G/DL (ref 13.5–17.5)
KETONES, POC: NORMAL MG/DL
LDL CHOLESTEROL: 146 MG/DL
LEUKOCYTE EST, POC: NORMAL
LYMPHOCYTES # BLD: 2.1 K/UL (ref 1–5.1)
LYMPHOCYTES NFR BLD: 26.4 %
MCH RBC QN AUTO: 30.4 PG (ref 26–34)
MCHC RBC AUTO-ENTMCNC: 33.8 G/DL (ref 31–36)
MCV RBC AUTO: 89.9 FL (ref 80–100)
MONOCYTES # BLD: 0.7 K/UL (ref 0–1.3)
MONOCYTES NFR BLD: 9.2 %
NEUTROPHILS # BLD: 4.7 K/UL (ref 1.7–7.7)
NEUTROPHILS NFR BLD: 59.9 %
NITRITE, POC: NORMAL
PH, POC: NORMAL
PLATELET # BLD AUTO: 181 K/UL (ref 135–450)
PMV BLD AUTO: 10.1 FL (ref 5–10.5)
POTASSIUM SERPL-SCNC: 4.4 MMOL/L (ref 3.5–5.1)
PROT SERPL-MCNC: 6.6 G/DL (ref 6.4–8.2)
PROTEIN, POC: NORMAL MG/DL
PSA SERPL DL<=0.01 NG/ML-MCNC: 2.81 NG/ML (ref 0–4)
RBC # BLD AUTO: 5.25 M/UL (ref 4.2–5.9)
SODIUM SERPL-SCNC: 142 MMOL/L (ref 136–145)
SPECIFIC GRAVITY, POC: NORMAL
TRIGL SERPL-MCNC: 236 MG/DL (ref 0–150)
TSH SERPL DL<=0.005 MIU/L-ACNC: 2.04 UIU/ML (ref 0.27–4.2)
URATE SERPL-MCNC: 7.4 MG/DL (ref 3.5–7.2)
UROBILINOGEN, POC: NORMAL MG/DL
VLDLC SERPL CALC-MCNC: 47 MG/DL
WBC # BLD AUTO: 7.8 K/UL (ref 4–11)

## 2025-06-24 PROCEDURE — 1159F MED LIST DOCD IN RCRD: CPT | Performed by: INTERNAL MEDICINE

## 2025-06-24 PROCEDURE — 81002 URINALYSIS NONAUTO W/O SCOPE: CPT | Performed by: INTERNAL MEDICINE

## 2025-06-24 PROCEDURE — 3046F HEMOGLOBIN A1C LEVEL >9.0%: CPT | Performed by: INTERNAL MEDICINE

## 2025-06-24 PROCEDURE — 3078F DIAST BP <80 MM HG: CPT | Performed by: INTERNAL MEDICINE

## 2025-06-24 PROCEDURE — 3017F COLORECTAL CA SCREEN DOC REV: CPT | Performed by: INTERNAL MEDICINE

## 2025-06-24 PROCEDURE — G0439 PPPS, SUBSEQ VISIT: HCPCS | Performed by: INTERNAL MEDICINE

## 2025-06-24 PROCEDURE — 1160F RVW MEDS BY RX/DR IN RCRD: CPT | Performed by: INTERNAL MEDICINE

## 2025-06-24 PROCEDURE — 3077F SYST BP >= 140 MM HG: CPT | Performed by: INTERNAL MEDICINE

## 2025-06-24 PROCEDURE — 1123F ACP DISCUSS/DSCN MKR DOCD: CPT | Performed by: INTERNAL MEDICINE

## 2025-06-24 ASSESSMENT — PATIENT HEALTH QUESTIONNAIRE - PHQ9
SUM OF ALL RESPONSES TO PHQ QUESTIONS 1-9: 0
2. FEELING DOWN, DEPRESSED OR HOPELESS: NOT AT ALL
1. LITTLE INTEREST OR PLEASURE IN DOING THINGS: NOT AT ALL
SUM OF ALL RESPONSES TO PHQ QUESTIONS 1-9: 0
1. LITTLE INTEREST OR PLEASURE IN DOING THINGS: NOT AT ALL
SUM OF ALL RESPONSES TO PHQ QUESTIONS 1-9: 0
2. FEELING DOWN, DEPRESSED OR HOPELESS: NOT AT ALL
SUM OF ALL RESPONSES TO PHQ QUESTIONS 1-9: 0

## 2025-06-24 NOTE — PATIENT INSTRUCTIONS

## 2025-06-24 NOTE — PROGRESS NOTES
Medicare Annual Wellness Visit    Rell Avendaño is here for Medicare AWV    Assessment & Plan   Medicare annual wellness visit, subsequent  -     PSA, Prostatic Specific Antigen ([facility]); Future  Type 2 diabetes mellitus without complication, without long-term current use of insulin ([facility])  -     Hemoglobin A1C; Future  -      DIABETES FOOT EXAM  H/O Prinzmetal angina  Gastroesophageal reflux disease without esophagitis  Mixed hyperlipidemia  -     Lipid, Fasting; Future  -     Comprehensive Metabolic Panel; Future  -     CBC with Auto Differential; Future  Anxiety  Hypertension, essential  -     POCT Urinalysis no Micro  -     Uric Acid; Future  -     TSH reflex to FT4; Future  ACP (advance care planning)       Return in 6 months (on 12/24/2025) for htn dm .     Subjective     69 y.o.. Male with known h.o HTN, Hyperlipidemia, anxiety here for medicare wellness visit      Since last visit pt remains stable with chronic issues   dizziness symptoms remain intermittently with occasional close falls but no syncope or LOC   No injuries   Not taking meclizine regularly     Reports he has chronic left hearing loss and now completely lost hearing   Right hearing is suboptimal and just had hearing aids and still with hearing issues       DM- 2-   changed  diet and lost weight and home sugars are below 170 now on glipizide 2.5 mg bid  Cut down carb diet   Home log noted   Compliant with diet and checking daily No low sugars   No tingling numbness in feet  No blurry vision   need eye exam this year       HTn - compliant with meds. On metoprolol, hyzaar- off norvasc for itching  No edema    Hyperlipidemia - on fenofibrate and unable to tolerate statins,  on fish oil , fenofibrate for low   Recently resumed with pravachol  previously stopped fenofibrate for skin rash      Anxiety on xanax using only as needed for chest pains which helps. Cut down from TID to half tab prn now    Does not smoke or chew anymore    Lives

## 2025-06-25 ENCOUNTER — RESULTS FOLLOW-UP (OUTPATIENT)
Dept: INTERNAL MEDICINE CLINIC | Age: 70
End: 2025-06-25

## 2025-06-25 LAB
EST. AVERAGE GLUCOSE BLD GHB EST-MCNC: 142.7 MG/DL
HBA1C MFR BLD: 6.6 %

## 2025-06-30 RX ORDER — GLIPIZIDE 2.5 MG/1
TABLET, EXTENDED RELEASE ORAL
Qty: 180 TABLET | Refills: 1 | Status: SHIPPED | OUTPATIENT
Start: 2025-06-30

## 2025-07-22 DIAGNOSIS — F41.9 ANXIETY: ICD-10-CM

## 2025-07-23 RX ORDER — ALPRAZOLAM 1 MG/1
TABLET ORAL
Qty: 15 TABLET | Refills: 0 | Status: SHIPPED | OUTPATIENT
Start: 2025-07-23 | End: 2025-08-21

## 2025-07-24 PROBLEM — Z00.00 MEDICARE ANNUAL WELLNESS VISIT, SUBSEQUENT: Status: RESOLVED | Noted: 2025-06-24 | Resolved: 2025-07-24

## 2025-08-20 RX ORDER — ESCITALOPRAM OXALATE 10 MG/1
10 TABLET ORAL DAILY
Qty: 90 TABLET | Refills: 1 | Status: SHIPPED | OUTPATIENT
Start: 2025-08-20

## 2025-08-25 ENCOUNTER — OFFICE VISIT (OUTPATIENT)
Dept: INTERNAL MEDICINE CLINIC | Age: 70
End: 2025-08-25

## 2025-08-25 VITALS
HEIGHT: 72 IN | WEIGHT: 245 LBS | RESPIRATION RATE: 18 BRPM | HEART RATE: 65 BPM | DIASTOLIC BLOOD PRESSURE: 75 MMHG | BODY MASS INDEX: 33.18 KG/M2 | SYSTOLIC BLOOD PRESSURE: 118 MMHG

## 2025-08-25 DIAGNOSIS — K21.9 GASTROESOPHAGEAL REFLUX DISEASE WITHOUT ESOPHAGITIS: ICD-10-CM

## 2025-08-25 DIAGNOSIS — Z86.79 H/O PRINZMETAL ANGINA: ICD-10-CM

## 2025-08-25 DIAGNOSIS — R14.0 ABDOMINAL BLOATING: Primary | ICD-10-CM

## 2025-08-25 PROCEDURE — G8427 DOCREV CUR MEDS BY ELIG CLIN: HCPCS | Performed by: INTERNAL MEDICINE

## 2025-08-25 PROCEDURE — 3074F SYST BP LT 130 MM HG: CPT | Performed by: INTERNAL MEDICINE

## 2025-08-25 PROCEDURE — G8417 CALC BMI ABV UP PARAM F/U: HCPCS | Performed by: INTERNAL MEDICINE

## 2025-08-25 PROCEDURE — 1160F RVW MEDS BY RX/DR IN RCRD: CPT | Performed by: INTERNAL MEDICINE

## 2025-08-25 PROCEDURE — 1159F MED LIST DOCD IN RCRD: CPT | Performed by: INTERNAL MEDICINE

## 2025-08-25 PROCEDURE — 1036F TOBACCO NON-USER: CPT | Performed by: INTERNAL MEDICINE

## 2025-08-25 PROCEDURE — 3078F DIAST BP <80 MM HG: CPT | Performed by: INTERNAL MEDICINE

## 2025-08-25 PROCEDURE — 1123F ACP DISCUSS/DSCN MKR DOCD: CPT | Performed by: INTERNAL MEDICINE

## 2025-08-25 PROCEDURE — 3017F COLORECTAL CA SCREEN DOC REV: CPT | Performed by: INTERNAL MEDICINE

## 2025-08-25 PROCEDURE — 99212 OFFICE O/P EST SF 10 MIN: CPT | Performed by: INTERNAL MEDICINE

## 2025-09-02 ENCOUNTER — TELEPHONE (OUTPATIENT)
Dept: INTERNAL MEDICINE CLINIC | Age: 70
End: 2025-09-02

## 2025-09-02 RX ORDER — VONOPRAZAN FUMARATE 26.72 MG/1
20 TABLET ORAL
Qty: 30 TABLET | Refills: 0 | Status: SHIPPED | OUTPATIENT
Start: 2025-09-02

## 2025-09-05 ENCOUNTER — TELEPHONE (OUTPATIENT)
Dept: INTERNAL MEDICINE CLINIC | Age: 70
End: 2025-09-05

## (undated) DEVICE — ELECTRODE PT RET AD L9FT HI MOIST COND ADH HYDRGEL CORDED

## (undated) DEVICE — Device: Brand: DISPOSABLE ELECTROSURGICAL SNARE

## (undated) DEVICE — FORCEPS BX L240CM DIA2.4MM L NDL RAD JAW 4 133340

## (undated) DEVICE — ACUSNARE POLYPECTOMY SNARE SOFT: Brand: ACUSNARE

## (undated) DEVICE — TRAP POLYP ETRAP

## (undated) DEVICE — SINGLE USE LIGATING DEVICE: Brand: SINGLE USE LIGATING DEVICE

## (undated) DEVICE — ENDO CARRY-ON PROCEDURE KIT INCLUDES SUCTION TUBING, LUBRICANT, GAUZE, BIOHAZARD STICKER, TRANSPORT PAD AND INTERCEPT BEDSIDE KIT.: Brand: ENDO CARRY-ON PROCEDURE KIT

## (undated) DEVICE — ELECTRODE ECG MONITR FOAM TEAR DROP ADLT RED